# Patient Record
Sex: FEMALE | Race: WHITE | NOT HISPANIC OR LATINO | Employment: OTHER | URBAN - METROPOLITAN AREA
[De-identification: names, ages, dates, MRNs, and addresses within clinical notes are randomized per-mention and may not be internally consistent; named-entity substitution may affect disease eponyms.]

---

## 2017-01-11 ENCOUNTER — LAB VISIT (OUTPATIENT)
Dept: LAB | Facility: HOSPITAL | Age: 77
End: 2017-01-11
Attending: FAMILY MEDICINE
Payer: MEDICARE

## 2017-01-11 DIAGNOSIS — E78.5 HYPERLIPIDEMIA: ICD-10-CM

## 2017-01-11 DIAGNOSIS — I10 ESSENTIAL HYPERTENSION: ICD-10-CM

## 2017-01-11 LAB
ALBUMIN SERPL BCP-MCNC: 3.9 G/DL
ALP SERPL-CCNC: 78 U/L
ALT SERPL W/O P-5'-P-CCNC: 11 U/L
ANION GAP SERPL CALC-SCNC: 9 MMOL/L
AST SERPL-CCNC: 20 U/L
BILIRUB DIRECT SERPL-MCNC: 0.1 MG/DL
BILIRUB SERPL-MCNC: 0.3 MG/DL
BUN SERPL-MCNC: 23 MG/DL
CALCIUM SERPL-MCNC: 9.6 MG/DL
CHLORIDE SERPL-SCNC: 106 MMOL/L
CHOLEST/HDLC SERPL: 3.5 {RATIO}
CO2 SERPL-SCNC: 25 MMOL/L
CREAT SERPL-MCNC: 0.9 MG/DL
EST. GFR  (AFRICAN AMERICAN): >60 ML/MIN/1.73 M^2
EST. GFR  (NON AFRICAN AMERICAN): >60 ML/MIN/1.73 M^2
GLUCOSE SERPL-MCNC: 102 MG/DL
HDL/CHOLESTEROL RATIO: 28.6 %
HDLC SERPL-MCNC: 220 MG/DL
HDLC SERPL-MCNC: 63 MG/DL
LDLC SERPL CALC-MCNC: 126.8 MG/DL
NONHDLC SERPL-MCNC: 157 MG/DL
POTASSIUM SERPL-SCNC: 5.3 MMOL/L
PROT SERPL-MCNC: 7.9 G/DL
SODIUM SERPL-SCNC: 140 MMOL/L
TRIGL SERPL-MCNC: 151 MG/DL

## 2017-01-11 PROCEDURE — 36415 COLL VENOUS BLD VENIPUNCTURE: CPT | Mod: PO

## 2017-01-11 PROCEDURE — 80061 LIPID PANEL: CPT

## 2017-01-11 PROCEDURE — 80048 BASIC METABOLIC PNL TOTAL CA: CPT

## 2017-01-11 PROCEDURE — 80076 HEPATIC FUNCTION PANEL: CPT

## 2017-01-12 ENCOUNTER — TELEPHONE (OUTPATIENT)
Dept: FAMILY MEDICINE | Facility: CLINIC | Age: 77
End: 2017-01-12

## 2017-01-12 DIAGNOSIS — E78.5 HYPERLIPIDEMIA, UNSPECIFIED HYPERLIPIDEMIA TYPE: Primary | ICD-10-CM

## 2017-01-12 NOTE — TELEPHONE ENCOUNTER
----- Message from Joseluis Norman MD sent at 1/11/2017  9:44 PM CST -----  Cont current dose of lipid meds and recheck this in 6 months.

## 2017-02-14 ENCOUNTER — LAB VISIT (OUTPATIENT)
Dept: LAB | Facility: HOSPITAL | Age: 77
End: 2017-02-14
Attending: FAMILY MEDICINE
Payer: MEDICARE

## 2017-02-14 DIAGNOSIS — E03.9 HYPOTHYROIDISM, UNSPECIFIED TYPE: ICD-10-CM

## 2017-02-14 LAB
T4 FREE SERPL-MCNC: 1.17 NG/DL
TSH SERPL DL<=0.005 MIU/L-ACNC: 6.2 UIU/ML

## 2017-02-14 PROCEDURE — 36415 COLL VENOUS BLD VENIPUNCTURE: CPT | Mod: PO

## 2017-02-14 PROCEDURE — 84439 ASSAY OF FREE THYROXINE: CPT

## 2017-02-14 PROCEDURE — 84443 ASSAY THYROID STIM HORMONE: CPT

## 2017-02-16 DIAGNOSIS — E05.90 HYPERTHYROIDISM: Primary | ICD-10-CM

## 2017-02-16 RX ORDER — LEVOTHYROXINE SODIUM 50 UG/1
50 TABLET ORAL DAILY
Qty: 45 TABLET | Refills: 3 | Status: SHIPPED | OUTPATIENT
Start: 2017-02-16 | End: 2017-04-27 | Stop reason: SDUPTHER

## 2017-02-16 NOTE — TELEPHONE ENCOUNTER
----- Message from Joseluis Norman MD sent at 2/14/2017  6:07 PM CST -----  This is beteter than before but not quite where it needs to be.   She was on too much medicine when the dose was 0.075 mg per day.   Therefore, change her to 50 mcg every even day and 75 mcg every odd day.   Recheck her in 2 months.

## 2017-02-16 NOTE — TELEPHONE ENCOUNTER
I have signed for the following orders AND/OR meds.  Please call the patient and ask the patient to schedule the testing AND/OR inform about any medications that were sent.      Orders Placed This Encounter   Procedures    TSH     Standing Status:   Future     Standing Expiration Date:   4/17/2018         Medications Ordered This Encounter      levothyroxine (SYNTHROID) 50 MCG tablet          Sig: Take 1 tablet (50 mcg total) by mouth once daily. 50 mcg every even day and 75 mcg every odd day          Dispense:  45 tablet          Refill:  3

## 2017-03-14 DIAGNOSIS — E03.9 HYPOTHYROIDISM, UNSPECIFIED TYPE: Primary | ICD-10-CM

## 2017-03-15 NOTE — TELEPHONE ENCOUNTER
The pharmacy is requesting a 25 mcg dose of synthroid.  In Feb, I changed it to the following:    Notes Recorded by Joseluis Norman MD on 2/14/2017 at 6:07 PM  This is beteter than before but not quite where it needs to be.   She was on too much medicine when the dose was 0.075 mg per day.   Therefore, change her to 50 mcg every even day and 75 mcg every odd day.   Recheck her in 2 months.          Please confirm that she changed and that she is on the doses above and not 25 mcg a day.  She should be rechecked 2 months from changing her dose which would put her in late April if she changed in February when I suggested this.

## 2017-04-03 RX ORDER — LEVOTHYROXINE SODIUM 25 UG/1
TABLET ORAL
Qty: 90 TABLET | Refills: 0 | Status: SHIPPED | OUTPATIENT
Start: 2017-04-03 | End: 2017-04-28

## 2017-04-03 NOTE — TELEPHONE ENCOUNTER
Patient states she is taking a 75mcg ( a 50mcg and half a 50mcg to equal 75mcg) on Monday Wednesday AND Friday. On Tuesday Thursday Saturday and Sunday she is only taking 50mcg.     The 25 mcg is being requested so she doesn't have to keep breaking the 50mcg

## 2017-04-27 ENCOUNTER — OFFICE VISIT (OUTPATIENT)
Dept: CARDIOLOGY | Facility: CLINIC | Age: 77
End: 2017-04-27
Payer: MEDICARE

## 2017-04-27 ENCOUNTER — LAB VISIT (OUTPATIENT)
Dept: LAB | Facility: HOSPITAL | Age: 77
End: 2017-04-27
Attending: FAMILY MEDICINE
Payer: MEDICARE

## 2017-04-27 VITALS
SYSTOLIC BLOOD PRESSURE: 141 MMHG | BODY MASS INDEX: 28.05 KG/M2 | HEART RATE: 65 BPM | HEIGHT: 61 IN | WEIGHT: 148.56 LBS | DIASTOLIC BLOOD PRESSURE: 62 MMHG

## 2017-04-27 DIAGNOSIS — I47.19 ECTOPIC ATRIAL TACHYCARDIA: Primary | ICD-10-CM

## 2017-04-27 DIAGNOSIS — I10 ESSENTIAL HYPERTENSION: ICD-10-CM

## 2017-04-27 DIAGNOSIS — E78.5 DYSLIPIDEMIA: Chronic | ICD-10-CM

## 2017-04-27 DIAGNOSIS — E05.90 HYPERTHYROIDISM: ICD-10-CM

## 2017-04-27 DIAGNOSIS — I71.40 ABDOMINAL AORTIC ANEURYSM (AAA) WITHOUT RUPTURE: ICD-10-CM

## 2017-04-27 LAB — TSH SERPL DL<=0.005 MIU/L-ACNC: 3.52 UIU/ML

## 2017-04-27 PROCEDURE — 84443 ASSAY THYROID STIM HORMONE: CPT

## 2017-04-27 PROCEDURE — 99999 PR PBB SHADOW E&M-EST. PATIENT-LVL III: CPT | Mod: PBBFAC,,, | Performed by: NURSE PRACTITIONER

## 2017-04-27 PROCEDURE — 99213 OFFICE O/P EST LOW 20 MIN: CPT | Mod: S$PBB,,, | Performed by: NURSE PRACTITIONER

## 2017-04-27 PROCEDURE — 36415 COLL VENOUS BLD VENIPUNCTURE: CPT | Mod: PO

## 2017-04-27 RX ORDER — LEVOTHYROXINE SODIUM 50 UG/1
TABLET ORAL
Qty: 45 TABLET | Refills: 0 | Status: SHIPPED | OUTPATIENT
Start: 2017-04-27 | End: 2017-04-28

## 2017-04-27 NOTE — PROGRESS NOTES
Subjective:    Patient ID:  Gloria Hale is a 76 y.o. female who presents for follow-up of Follow-up      HPI: Ms. Gloria Hale presents to the clinic for follow up of non-obstructive carotid artery disease, EAT, HTN, HLP. she stated that she is doing pretty well. She denied any chest pain; however, she does have episodes of SOB and DOMINGUEZ that are chronic and unchanged from the past. She does notice palpitations at night. They are usually brief, but one time, it lasted about one hour. She did not have chest discomfort or SOB at the time. She takes metoprolol at night.  She stated that her first cousin passed away, and she went to the  yesterday. She is feeling low, but otherwise ok.    Medications: she is not missing any doses. She is having changes in thyroid replacement needs, and her dose has been adjusted to 75mcg M, W, F and 50mcg all other days. She is due to have lab work today.  Sodium: she does not add salt to foods,  she is not reading labels for sodium content. She stated that she eats a lot of pork.  Exercise: she tries to be active as much as possible; she is limited by chronic back pain. She does some work in the yard when she can. It usually takes a couple of days to recover from this. She feels tired with no evergy after working in the yard. No formal exercise.Tobacco:Former smoker; quit . no alcohol use      Carotid U/S 16: CONCLUSIONS   There is 40 - 49% right Internal Carotid stenosis.  There is 20 - 39% left Internal Carotid stenosis.    U/S Abdominal Aorta 10/05/16: Findings: Comparison 9 . Stable fusiform infrarenal abdominal aortic aneurysm measuring 2.9 x 2.7 cm in diameter 3.3 cm in length. Arterial sclerotic disease is again noted. Proximal aorta measured 2.2 x 2.5 cm. Mid aorta measured 1.9 x 1.7 cm. Both common iliac arteries are 10 millimeters in diameter.     Weight: 67.4 kg (148 lb 9.4 oz) she states that her daily weights has been stable  Wt Readings from Last 3  Encounters:   04/27/17 67.4 kg (148 lb 9.4 oz)   12/14/16 65.9 kg (145 lb 4.5 oz)   10/24/16 65.6 kg (144 lb 10 oz)     BP log: None. She does not check her BP at home.      Review of Systems   Constitution: Negative for chills, decreased appetite, fever, night sweats, weight gain and weight loss.   HENT: Negative for congestion.    Cardiovascular: Negative for chest pain, claudication, cyanosis, dyspnea on exertion, irregular heartbeat, leg swelling, near-syncope, orthopnea, palpitations, paroxysmal nocturnal dyspnea and syncope.   Respiratory: Negative for cough, hemoptysis, shortness of breath, sputum production and wheezing.    Hematologic/Lymphatic: Negative for adenopathy and bleeding problem. Does not bruise/bleed easily.   Skin: Negative for color change and nail changes.   Gastrointestinal: Negative for bloating, abdominal pain, change in bowel habit, heartburn, hematochezia, melena, nausea and vomiting.   Genitourinary: Negative for hematuria.   Neurological: Negative for dizziness and light-headedness.   Psychiatric/Behavioral: Negative for altered mental status.   Results for BABATUNDE AMIN (MRN [id number]) as of 4/27/2017 08:58   Ref. Range 1/11/2017 10:39   Sodium Latest Ref Range: 136 - 145 mmol/L 140   Potassium Latest Ref Range: 3.5 - 5.1 mmol/L 5.3 (H)   Chloride Latest Ref Range: 95 - 110 mmol/L 106   CO2 Latest Ref Range: 23 - 29 mmol/L 25   Anion Gap Latest Ref Range: 8 - 16 mmol/L 9   BUN, Bld Latest Ref Range: 8 - 23 mg/dL 23   Creatinine Latest Ref Range: 0.5 - 1.4 mg/dL 0.9   eGFR if non African American Latest Ref Range: >60 mL/min/1.73 m^2 >60.0   eGFR if African American Latest Ref Range: >60 mL/min/1.73 m^2 >60.0   Glucose Latest Ref Range: 70 - 110 mg/dL 102   Calcium Latest Ref Range: 8.7 - 10.5 mg/dL 9.6   Alkaline Phosphatase Latest Ref Range: 55 - 135 U/L 78   Total Protein Latest Ref Range: 6.0 - 8.4 g/dL 7.9   Albumin Latest Ref Range: 3.5 - 5.2 g/dL 3.9   Total Bilirubin Latest Ref  Range: 0.1 - 1.0 mg/dL 0.3   Bilirubin, Direct Latest Ref Range: 0.1 - 0.3 mg/dL 0.1   AST Latest Ref Range: 10 - 40 U/L 20   ALT Latest Ref Range: 10 - 44 U/L 11   Triglycerides Latest Ref Range: 30 - 150 mg/dL 151 (H)   Cholesterol Latest Ref Range: 120 - 199 mg/dL 220 (H)   HDL Latest Ref Range: 40 - 75 mg/dL 63   LDL Cholesterol Latest Ref Range: 63.0 - 159.0 mg/dL 126.8   Total Cholesterol/HDL Ratio Latest Ref Range: 2.0 - 5.0  3.5   Non-    Objective:   Physical Exam   Constitutional: She is oriented to person, place, and time. She appears well-developed and well-nourished. No distress.   HENT:   Head: Normocephalic and atraumatic.   Eyes: Conjunctivae are normal. No scleral icterus.   Neck: Neck supple. No JVD present. No tracheal deviation present. No thyromegaly present.   Cardiovascular: Normal rate, regular rhythm and intact distal pulses.  Exam reveals no gallop and no friction rub.    Murmur heard.  High-pitched blowing holosystolic murmur is present with a grade of 2/6  at the upper left sternal border, lower left sternal border  Pulses:       Carotid pulses are on the left side with bruit.  Pulmonary/Chest: Effort normal and breath sounds normal. No respiratory distress. She has no wheezes. She has no rales. She exhibits no tenderness.   Abdominal: Soft. Bowel sounds are normal. She exhibits no distension and no mass. There is no tenderness. There is no rebound and no guarding.   Musculoskeletal: Normal range of motion. She exhibits no edema.   Lymphadenopathy:     She has no cervical adenopathy.   Neurological: She is alert and oriented to person, place, and time.   Skin: Skin is warm and dry. No rash noted. She is not diaphoretic. No erythema. No pallor.   Beemer   Psychiatric: She has a normal mood and affect.        Assessment:      1. Ectopic atrial tachycardia    2. Essential hypertension    3. Abdominal aortic aneurysm (AAA) without rupture    4. Dyslipidemia        Plan:   Continue  current medications as directed.  Reviewed FLP with Ms. Hale. Her LDL goal should be 100 or less, and Non HDL goal should be less than 130. Given her thyroid issue, will not change her cholesterol medication at this time. She has FLP planned for July.   Reduce fat intake.  Sodium restriction.  Monitor BP and heart rate at home.  Encouraged low impact exercise most days of the week.  Can use extra metoprolol tablet if needed for prolonged, persistent palpitations. Monitor BP and heart rate.  Dr. Vick is following AAA, and she just had ultrasound in October. She is not due for another one until later this year.  Follow up in 6 months or call sooner for any problems.

## 2017-04-27 NOTE — MR AVS SNAPSHOT
[facility]  [address]  [place] [address]  Phone: [phone]  Fax: [phone]                  Gloria Hale   2017 9:00 AM   Office Visit    Description:  Female : 1940   Provider:  Siena Holliday NP   Department:  [facility]           Reason for Visit     Follow-up           Diagnoses this Visit        Comments    Ectopic atrial tachycardia    -  Primary     Essential hypertension         Abdominal aortic aneurysm (AAA) without rupture         Dyslipidemia                To Do List           Future Appointments        Provider Department Dept Phone    2017 8:25 AM LABORATORY, [place] [facility] [phone]      Goals (5 Years of Data)     None      Follow-Up and Disposition     Return in about 6 months (around 10/27/2017).      [facility]     [facility] Nurse Care Line -  Assistance  Unless otherwise directed by your provider, please contact [facility], our nurse care line that is available for  assistance.     Registered nurses in the [facility] provide: appointment scheduling, clinical advisement, health education, and other advisory services.  Call: [phone] (toll free)               Medications           Message regarding Medications     Verify the changes and/or additions to your medication regime listed below are the same as discussed with your clinician today.  If any of these changes or additions are incorrect, please notify your healthcare provider.             Verify that the below list of medications is an accurate representation of the medications you are currently taking.  If none reported, the list may be blank. If incorrect, please contact your healthcare provider. Carry this list with you in case of emergency.           Current Medications     amlodipine (NORVASC) 10 MG tablet Take 1 tablet (10 mg total) by mouth once daily.    calcium carbonate (OS-PAPITO) 500 mg calcium (1,250 mg)  "tablet Take 3 tablets by mouth once daily.    clotrimazole-betamethasone 1-0.05% (LOTRISONE) cream Apply topically 2 (two) times daily.    fish oil-omega-3 fatty acids 300-1,000 mg capsule Take 2 g by mouth once daily.    fluticasone (FLONASE) 50 mcg/actuation nasal spray 2 sprays by Each Nare route once daily.    gemfibrozil (LOPID) 600 MG tablet Take 1 tablet (600 mg total) by mouth 2 (two) times daily before meals.    levothyroxine (SYNTHROID) 25 MCG tablet TAKE 1 TABLET (25 MCG TOTAL) BY MOUTH ONCE DAILY.    lisinopril (PRINIVIL,ZESTRIL) 40 MG tablet Take 1 tablet (40 mg total) by mouth once daily.    metoprolol succinate (TOPROL-XL) 25 MG 24 hr tablet Take 1 tablet (25 mg total) by mouth once daily.    rosuvastatin (CRESTOR) 20 MG tablet Take 1 tablet (20 mg total) by mouth once daily. Give Generic Brand    levothyroxine (SYNTHROID) 50 MCG tablet TAKE 1 TABLET (50 MCG) EVERY EVEN DAY AND TAKE 1 1/2 TABLETS (75 MCG) EVERY ODD DAY           Clinical Reference Information           Your Vitals Were     BP Pulse Height Weight BMI    141/62 (BP Location: Left arm, Patient Position: Sitting, BP Method: Automatic) 65 5' 1" (1.549 m) 67.4 kg (148 lb 9.4 oz) 28.08 kg/m2      Blood Pressure          Most Recent Value    BP  (!)  141/62      Allergies as of 4/27/2017     No Known Allergies      Immunizations Administered on Date of Encounter - 4/27/2017     None      [facility] Sign-Up     Activating your [facility] account is as easy as 1-2-3!     1) Visit [email or web address], select Sign Up Now, enter this activation code and your date of birth, then select Next.  Activation code not generated  Current Patient Portal Status: Account disabled      2) Create a username and password to use when you visit [facility] in the future and select a security question in case you lose your password and select Next.    3) Enter your e-mail address and click Sign Up!    Additional Information  If you have questions, please e-mail " [email or web address] or call [phone] to talk to our [facility] staff. Remember, [facility] is NOT to be used for urgent needs. For medical emergencies, dial 911.         Language Assistance Services     ATTENTION: Language assistance services are available, free of charge. Please call [phone].      ATENCIÓN: Si habla español, tiene a camarena disposición servicios gratuitos de asistencia lingüística. Llame al [phone].     CHÚ Ý: N?u b?n nói Ti?ng Vi?t, có các d?ch v? h? tr? ngôn ng? mi?n phí dành cho b?n. G?i s? [phone].         [facility] complies with applicable Federal civil rights laws and does not discriminate on the basis of race, color, national origin, age, disability, or sex.

## 2017-04-28 RX ORDER — LEVOTHYROXINE SODIUM 75 UG/1
75 TABLET ORAL DAILY
Qty: 30 TABLET | Refills: 11 | Status: SHIPPED | OUTPATIENT
Start: 2017-04-28 | End: 2018-02-26 | Stop reason: SDUPTHER

## 2017-06-19 ENCOUNTER — TELEPHONE (OUTPATIENT)
Dept: FAMILY MEDICINE | Facility: CLINIC | Age: 77
End: 2017-06-19

## 2017-06-19 NOTE — TELEPHONE ENCOUNTER
----- Message from Jaime Ward sent at 6/19/2017 12:09 PM CDT -----  Contact: [phone]  Pt would like to consult with nurse about forms Dr Norman were to complete. Please call back at [phone] Thx LJ

## 2017-07-19 ENCOUNTER — LAB VISIT (OUTPATIENT)
Dept: LAB | Facility: HOSPITAL | Age: 77
End: 2017-07-19
Attending: FAMILY MEDICINE
Payer: MEDICARE

## 2017-07-19 DIAGNOSIS — E78.5 HYPERLIPIDEMIA, UNSPECIFIED HYPERLIPIDEMIA TYPE: ICD-10-CM

## 2017-07-19 LAB
ALBUMIN SERPL BCP-MCNC: 3.8 G/DL
ALP SERPL-CCNC: 72 U/L
ALT SERPL W/O P-5'-P-CCNC: 17 U/L
AST SERPL-CCNC: 20 U/L
BILIRUB DIRECT SERPL-MCNC: 0.2 MG/DL
BILIRUB SERPL-MCNC: 0.5 MG/DL
CHOLEST/HDLC SERPL: 3.5 {RATIO}
HDL/CHOLESTEROL RATIO: 28.6 %
HDLC SERPL-MCNC: 182 MG/DL
HDLC SERPL-MCNC: 52 MG/DL
LDLC SERPL CALC-MCNC: 101.2 MG/DL
NONHDLC SERPL-MCNC: 130 MG/DL
PROT SERPL-MCNC: 7.7 G/DL
TRIGL SERPL-MCNC: 144 MG/DL

## 2017-07-19 PROCEDURE — 80076 HEPATIC FUNCTION PANEL: CPT

## 2017-07-19 PROCEDURE — 80061 LIPID PANEL: CPT

## 2017-07-19 PROCEDURE — 36415 COLL VENOUS BLD VENIPUNCTURE: CPT | Mod: PO

## 2017-08-11 ENCOUNTER — OFFICE VISIT (OUTPATIENT)
Dept: FAMILY MEDICINE | Facility: CLINIC | Age: 77
End: 2017-08-11
Payer: MEDICARE

## 2017-08-11 VITALS
SYSTOLIC BLOOD PRESSURE: 150 MMHG | BODY MASS INDEX: 26.17 KG/M2 | TEMPERATURE: 98 F | HEIGHT: 62 IN | WEIGHT: 142.19 LBS | DIASTOLIC BLOOD PRESSURE: 59 MMHG | HEART RATE: 73 BPM

## 2017-08-11 DIAGNOSIS — J40 BRONCHITIS: ICD-10-CM

## 2017-08-11 DIAGNOSIS — J06.9 UPPER RESPIRATORY TRACT INFECTION, UNSPECIFIED TYPE: Primary | ICD-10-CM

## 2017-08-11 PROCEDURE — 99213 OFFICE O/P EST LOW 20 MIN: CPT | Mod: S$PBB,,, | Performed by: NURSE PRACTITIONER

## 2017-08-11 PROCEDURE — 99213 OFFICE O/P EST LOW 20 MIN: CPT | Mod: PBBFAC,PO | Performed by: NURSE PRACTITIONER

## 2017-08-11 PROCEDURE — 3077F SYST BP >= 140 MM HG: CPT | Mod: ,,, | Performed by: NURSE PRACTITIONER

## 2017-08-11 PROCEDURE — 99999 PR PBB SHADOW E&M-EST. PATIENT-LVL III: CPT | Mod: PBBFAC,,, | Performed by: NURSE PRACTITIONER

## 2017-08-11 PROCEDURE — 1126F AMNT PAIN NOTED NONE PRSNT: CPT | Mod: ,,, | Performed by: NURSE PRACTITIONER

## 2017-08-11 PROCEDURE — 3078F DIAST BP <80 MM HG: CPT | Mod: ,,, | Performed by: NURSE PRACTITIONER

## 2017-08-11 PROCEDURE — 1159F MED LIST DOCD IN RCRD: CPT | Mod: ,,, | Performed by: NURSE PRACTITIONER

## 2017-08-11 RX ORDER — CLOTRIMAZOLE AND BETAMETHASONE DIPROPIONATE 10; .64 MG/G; MG/G
CREAM TOPICAL 2 TIMES DAILY
Qty: 45 G | Refills: 0 | Status: SHIPPED | OUTPATIENT
Start: 2017-08-11 | End: 2018-03-26

## 2017-08-11 RX ORDER — AMOXICILLIN 875 MG/1
875 TABLET, FILM COATED ORAL 2 TIMES DAILY
Qty: 20 TABLET | Refills: 0 | Status: SHIPPED | OUTPATIENT
Start: 2017-08-11 | End: 2017-08-21

## 2017-08-11 RX ORDER — FLUTICASONE PROPIONATE 50 MCG
2 SPRAY, SUSPENSION (ML) NASAL DAILY
Qty: 16 G | Refills: 2 | Status: SHIPPED | OUTPATIENT
Start: 2017-08-11 | End: 2019-07-22

## 2017-08-11 NOTE — PROGRESS NOTES
Subjective:       Patient ID: Gloria Hale is a 76 y.o. female.    Chief Complaint: Nasal Congestion and Cough    HPI     Upper Respiratory Infection: Patient complains of symptoms of a URI, possible sinusitis. Symptoms include congestion, cough, sore throat and swollen glands. Onset of symptoms was 1 week ago, gradually worsening since that time. She also c/o cough described as productive and nasal congestion for the past 3 days .  She is drinking plenty of fluids. Evaluation to date: none. Treatment to date: antihistamines, cough suppressants and decongestants.        Review of Systems   Constitutional: Negative for fatigue, fever and unexpected weight change.   HENT: Positive for congestion, postnasal drip and sore throat. Negative for ear pain.    Eyes: Negative for pain and visual disturbance.   Respiratory: Positive for cough. Negative for shortness of breath.    Cardiovascular: Negative for chest pain and palpitations.   Gastrointestinal: Negative for abdominal pain, diarrhea and vomiting.   Musculoskeletal: Negative for arthralgias and myalgias.   Skin: Negative for color change and rash.   Neurological: Negative for dizziness and headaches.   Psychiatric/Behavioral: Negative for dysphoric mood and sleep disturbance. The patient is not nervous/anxious.        Vitals:    08/11/17 0942   BP: (!) 150/59   Pulse: 73   Temp: 97.6 °F (36.4 °C)       Objective:     Current Outpatient Prescriptions   Medication Sig Dispense Refill    amlodipine (NORVASC) 10 MG tablet Take 1 tablet (10 mg total) by mouth once daily. 90 tablet 3    calcium carbonate (OS-PAPITO) 500 mg calcium (1,250 mg) tablet Take 3 tablets by mouth once daily.      clotrimazole-betamethasone 1-0.05% (LOTRISONE) cream Apply topically 2 (two) times daily. 45 g 0    fish oil-omega-3 fatty acids 300-1,000 mg capsule Take 2 g by mouth once daily.      fluticasone (FLONASE) 50 mcg/actuation nasal spray 2 sprays by Each Nare route once daily. 16 g 2     levothyroxine (SYNTHROID) 75 MCG tablet Take 1 tablet (75 mcg total) by mouth once daily. 30 tablet 11    lisinopril (PRINIVIL,ZESTRIL) 40 MG tablet Take 1 tablet (40 mg total) by mouth once daily. 90 tablet 3    metoprolol succinate (TOPROL-XL) 25 MG 24 hr tablet Take 1 tablet (25 mg total) by mouth once daily. 90 tablet 3    rosuvastatin (CRESTOR) 20 MG tablet Take 1 tablet (20 mg total) by mouth once daily. Give Generic Brand 90 tablet 3    amoxicillin (AMOXIL) 875 MG tablet Take 1 tablet (875 mg total) by mouth 2 (two) times daily. 20 tablet 0    gemfibrozil (LOPID) 600 MG tablet Take 1 tablet (600 mg total) by mouth 2 (two) times daily before meals. 60 tablet 11     No current facility-administered medications for this visit.        Physical Exam   Constitutional: She is oriented to person, place, and time. She appears well-developed and well-nourished. No distress.   HENT:   Head: Normocephalic and atraumatic.   Right Ear: Tympanic membrane normal.   Left Ear: Tympanic membrane normal.   Nose: Mucosal edema and rhinorrhea present.   Mouth/Throat: Posterior oropharyngeal edema ( post nasal mucus) and posterior oropharyngeal erythema present. No oropharyngeal exudate.   Eyes: EOM are normal. Pupils are equal, round, and reactive to light.   Neck: Normal range of motion. Neck supple.   Cardiovascular: Normal rate and regular rhythm.    Pulmonary/Chest: Effort normal and breath sounds normal.   + loose cough   Musculoskeletal: Normal range of motion.   Lymphadenopathy:        Head (right side): Tonsillar adenopathy present.        Head (left side): Tonsillar adenopathy present.   Neurological: She is alert and oriented to person, place, and time.   Skin: Skin is warm and dry. No rash noted.   Psychiatric: She has a normal mood and affect. Judgment normal.   Nursing note and vitals reviewed.      Assessment:       1. Upper respiratory tract infection, unspecified type    2. Bronchitis        Plan:   Upper  respiratory tract infection, unspecified type  -     fluticasone (FLONASE) 50 mcg/actuation nasal spray; 2 sprays by Each Nare route once daily.  Dispense: 16 g; Refill: 2    Bronchitis  -     fluticasone (FLONASE) 50 mcg/actuation nasal spray; 2 sprays by Each Nare route once daily.  Dispense: 16 g; Refill: 2    Other orders  -     amoxicillin (AMOXIL) 875 MG tablet; Take 1 tablet (875 mg total) by mouth 2 (two) times daily.  Dispense: 20 tablet; Refill: 0  -     clotrimazole-betamethasone 1-0.05% (LOTRISONE) cream; Apply topically 2 (two) times daily.  Dispense: 45 g; Refill: 0        Return if symptoms worsen or fail to improve.

## 2017-08-17 ENCOUNTER — TELEPHONE (OUTPATIENT)
Dept: FAMILY MEDICINE | Facility: CLINIC | Age: 77
End: 2017-08-17

## 2017-08-17 NOTE — TELEPHONE ENCOUNTER
----- Message from Sha Duffy sent at 8/16/2017  8:41 AM CDT -----  Contact: [phone]  Pt would like to consult with the nurse about side effects form the antibiotics.  Pt would like another antibiotic called , Pt states  That the antibiotics makes her stomach upset and keeps her in the bathroom. Please call back at  [phone].  Thx-[facility]          Pt Uses:  .  [facility], [place], [place] - [address] [facility] [address] [id number] [address] [address]  Phone: [phone] Fax: [phone]

## 2017-08-18 ENCOUNTER — TELEPHONE (OUTPATIENT)
Dept: FAMILY MEDICINE | Facility: CLINIC | Age: 77
End: 2017-08-18

## 2017-08-18 NOTE — TELEPHONE ENCOUNTER
----- Message from Fritz Samuel sent at 8/18/2017  3:36 PM CDT -----  Contact: Pt   States she is calling cesia samuel her appt/ wants to be seen prior to next avail and can be reached at [phone]//[email or web address]/dbw

## 2017-08-21 ENCOUNTER — LAB VISIT (OUTPATIENT)
Dept: LAB | Facility: HOSPITAL | Age: 77
End: 2017-08-21
Attending: NURSE PRACTITIONER
Payer: MEDICARE

## 2017-08-21 ENCOUNTER — OFFICE VISIT (OUTPATIENT)
Dept: FAMILY MEDICINE | Facility: CLINIC | Age: 77
End: 2017-08-21
Payer: MEDICARE

## 2017-08-21 VITALS
DIASTOLIC BLOOD PRESSURE: 57 MMHG | HEIGHT: 62 IN | BODY MASS INDEX: 25.76 KG/M2 | TEMPERATURE: 98 F | WEIGHT: 140 LBS | HEART RATE: 76 BPM | SYSTOLIC BLOOD PRESSURE: 123 MMHG

## 2017-08-21 DIAGNOSIS — R93.89 ABNORMAL CT OF THE CHEST: ICD-10-CM

## 2017-08-21 DIAGNOSIS — J18.9 PNEUMONIA OF RIGHT LOWER LOBE DUE TO INFECTIOUS ORGANISM: ICD-10-CM

## 2017-08-21 DIAGNOSIS — J18.9 PNEUMONIA OF RIGHT LOWER LOBE DUE TO INFECTIOUS ORGANISM: Primary | ICD-10-CM

## 2017-08-21 LAB
BASOPHILS # BLD AUTO: 0.03 K/UL
BASOPHILS NFR BLD: 0.3 %
CREAT SERPL-MCNC: 1 MG/DL
DIFFERENTIAL METHOD: ABNORMAL
EOSINOPHIL # BLD AUTO: 0.1 K/UL
EOSINOPHIL NFR BLD: 1.4 %
ERYTHROCYTE [DISTWIDTH] IN BLOOD BY AUTOMATED COUNT: 13.5 %
EST. GFR  (AFRICAN AMERICAN): >60 ML/MIN/1.73 M^2
EST. GFR  (NON AFRICAN AMERICAN): 53.7 ML/MIN/1.73 M^2
HCT VFR BLD AUTO: 39.3 %
HGB BLD-MCNC: 12.8 G/DL
LYMPHOCYTES # BLD AUTO: 2.7 K/UL
LYMPHOCYTES NFR BLD: 25.8 %
MCH RBC QN AUTO: 27.2 PG
MCHC RBC AUTO-ENTMCNC: 32.6 G/DL
MCV RBC AUTO: 83 FL
MONOCYTES # BLD AUTO: 1.3 K/UL
MONOCYTES NFR BLD: 12.3 %
NEUTROPHILS # BLD AUTO: 6.2 K/UL
NEUTROPHILS NFR BLD: 60 %
PLATELET # BLD AUTO: 395 K/UL
PMV BLD AUTO: 10.2 FL
RBC # BLD AUTO: 4.71 M/UL
WBC # BLD AUTO: 10.26 K/UL

## 2017-08-21 PROCEDURE — 1126F AMNT PAIN NOTED NONE PRSNT: CPT | Mod: ,,, | Performed by: NURSE PRACTITIONER

## 2017-08-21 PROCEDURE — 99213 OFFICE O/P EST LOW 20 MIN: CPT | Mod: S$PBB,,, | Performed by: NURSE PRACTITIONER

## 2017-08-21 PROCEDURE — 99999 PR PBB SHADOW E&M-EST. PATIENT-LVL III: CPT | Mod: PBBFAC,,, | Performed by: NURSE PRACTITIONER

## 2017-08-21 PROCEDURE — 85025 COMPLETE CBC W/AUTO DIFF WBC: CPT

## 2017-08-21 PROCEDURE — 82565 ASSAY OF CREATININE: CPT

## 2017-08-21 PROCEDURE — 36415 COLL VENOUS BLD VENIPUNCTURE: CPT | Mod: PO

## 2017-08-21 PROCEDURE — 3078F DIAST BP <80 MM HG: CPT | Mod: ,,, | Performed by: NURSE PRACTITIONER

## 2017-08-21 PROCEDURE — 1159F MED LIST DOCD IN RCRD: CPT | Mod: ,,, | Performed by: NURSE PRACTITIONER

## 2017-08-21 PROCEDURE — 3074F SYST BP LT 130 MM HG: CPT | Mod: ,,, | Performed by: NURSE PRACTITIONER

## 2017-08-21 RX ORDER — AZITHROMYCIN 250 MG/1
TABLET, FILM COATED ORAL
COMMUNITY
Start: 2017-08-18 | End: 2017-08-28

## 2017-08-21 RX ORDER — ALBUTEROL SULFATE 90 UG/1
2 AEROSOL, METERED RESPIRATORY (INHALATION)
COMMUNITY
Start: 2017-08-17 | End: 2017-08-28 | Stop reason: SDUPTHER

## 2017-08-21 RX ORDER — ALBUTEROL SULFATE 0.63 MG/3ML
0.63 SOLUTION RESPIRATORY (INHALATION)
COMMUNITY
Start: 2017-08-17 | End: 2017-08-30 | Stop reason: SDUPTHER

## 2017-08-21 NOTE — PROGRESS NOTES
Subjective:       Patient ID: Gloria Hale is a 77 y.o. female.    Chief Complaint: Follow-up    HPI     She comes into the office for follow-up.  She was seen in the office on the 11th, she was discharged with a prescription for Augmentin.  She states this was too strong and upset her stomach causing diarrhea.  She discontinued this medicine.  Respiratory symptoms worsened, she went to the emergency room at [facility].  She was treated for pneumonia and given a prescription for a azithromycin.  She had her last dose of azithromycin today, she has 1 more day of prednisone to take.  She continues to have weakness and fatigue, nonproductive cough, and general malaise.  She is insistent that she is given another antibiotic today.  In reviewing her CAT scan, it was unclear if the abnormality to the right lung was pneumonia or something else.  CT of the chest was recommended to ensure that the pneumonia resolves.    Review of Systems   Constitutional: Positive for fatigue. Negative for fever and unexpected weight change.   HENT: Negative for ear pain and sore throat.    Eyes: Negative for pain and visual disturbance.   Respiratory: Positive for choking and chest tightness. Negative for cough and shortness of breath.    Cardiovascular: Negative for chest pain and palpitations.   Gastrointestinal: Negative for abdominal pain, diarrhea and vomiting.   Musculoskeletal: Negative for arthralgias and myalgias.   Skin: Negative for color change and rash.   Neurological: Positive for weakness. Negative for dizziness and headaches.   Psychiatric/Behavioral: Negative for dysphoric mood and sleep disturbance. The patient is not nervous/anxious.        Vitals:    08/21/17 0923   BP: (!) 123/57   Pulse: 76   Temp: 97.8 °F (36.6 °C)       Objective:     Current Outpatient Prescriptions   Medication Sig Dispense Refill    albuterol (ACCUNEB) 0.63 mg/3 mL Nebu Inhale 0.63 mg into the lungs.      albuterol 90 mcg/actuation  inhaler Inhale 2 puffs into the lungs.      amlodipine (NORVASC) 10 MG tablet Take 1 tablet (10 mg total) by mouth once daily. 90 tablet 3    azithromycin (Z-JAROD) 250 MG tablet Take one 250mg tablet PO daily for 4 days.      calcium carbonate (OS-PAPITO) 500 mg calcium (1,250 mg) tablet Take 3 tablets by mouth once daily.      clotrimazole-betamethasone 1-0.05% (LOTRISONE) cream Apply topically 2 (two) times daily. 45 g 0    fish oil-omega-3 fatty acids 300-1,000 mg capsule Take 2 g by mouth once daily.      fluticasone (FLONASE) 50 mcg/actuation nasal spray 2 sprays by Each Nare route once daily. 16 g 2    gemfibrozil (LOPID) 600 MG tablet Take 1 tablet (600 mg total) by mouth 2 (two) times daily before meals. 60 tablet 11    levothyroxine (SYNTHROID) 75 MCG tablet Take 1 tablet (75 mcg total) by mouth once daily. 30 tablet 11    lisinopril (PRINIVIL,ZESTRIL) 40 MG tablet Take 1 tablet (40 mg total) by mouth once daily. 90 tablet 3    metoprolol succinate (TOPROL-XL) 25 MG 24 hr tablet Take 1 tablet (25 mg total) by mouth once daily. 90 tablet 3    rosuvastatin (CRESTOR) 20 MG tablet Take 1 tablet (20 mg total) by mouth once daily. Give Generic Brand 90 tablet 3     No current facility-administered medications for this visit.        Physical Exam   Constitutional: She is oriented to person, place, and time. She appears well-developed and well-nourished. No distress.   HENT:   Head: Normocephalic and atraumatic.   Mouth/Throat: Oropharynx is clear and moist.   Eyes: EOM are normal. Pupils are equal, round, and reactive to light.   Neck: Normal range of motion. Neck supple.   Cardiovascular: Normal rate and regular rhythm.    Pulmonary/Chest: Effort normal. No respiratory distress. She has decreased breath sounds in the right upper field, the right middle field and the right lower field. She has no wheezes. She has no rales.   Nonproductive cough   Musculoskeletal: Normal range of motion.   Lymphadenopathy:      She has no cervical adenopathy.   Neurological: She is alert and oriented to person, place, and time.   Skin: Skin is warm and dry. No rash noted.   Psychiatric: She has a normal mood and affect. Judgment normal.   Nursing note and vitals reviewed.      Assessment:       1. Pneumonia of right lower lobe due to infectious organism    2. Abnormal CT of the chest        Plan:   Pneumonia of right lower lobe due to infectious organism  -     CT Chest With Contrast; Future; Expected date: 08/22/2017  -     Creatinine, serum; Future; Expected date: 08/21/2017  -     CBC auto differential; Future; Expected date: 08/21/2017    Abnormal CT of the chest  -     CT Chest With Contrast; Future; Expected date: 08/22/2017  -     Creatinine, serum; Future; Expected date: 08/21/2017  -     CBC auto differential; Future; Expected date: 08/21/2017        No Follow-up on file.

## 2017-08-22 ENCOUNTER — TELEPHONE (OUTPATIENT)
Dept: FAMILY MEDICINE | Facility: CLINIC | Age: 77
End: 2017-08-22

## 2017-08-22 NOTE — TELEPHONE ENCOUNTER
----- Message from Nehemiah Perry sent at 8/22/2017  9:24 AM CDT -----  Pt is requesting a call from nurse to discuss a test.          Please call pt back at [phone]

## 2017-08-22 NOTE — TELEPHONE ENCOUNTER
Returned patient call. She states [facility] does not have her orders. Will refax today.

## 2017-08-28 ENCOUNTER — TELEPHONE (OUTPATIENT)
Dept: FAMILY MEDICINE | Facility: CLINIC | Age: 77
End: 2017-08-28

## 2017-08-28 DIAGNOSIS — R91.8 ABNORMAL CT SCAN OF LUNG: Primary | ICD-10-CM

## 2017-08-28 RX ORDER — ALBUTEROL SULFATE 90 UG/1
2 AEROSOL, METERED RESPIRATORY (INHALATION) EVERY 6 HOURS PRN
Qty: 8 G | Refills: 0 | Status: SHIPPED | OUTPATIENT
Start: 2017-08-28 | End: 2018-02-26 | Stop reason: SDUPTHER

## 2017-08-28 RX ORDER — AZITHROMYCIN 250 MG/1
TABLET, FILM COATED ORAL
Qty: 6 TABLET | Refills: 0 | Status: SHIPPED | OUTPATIENT
Start: 2017-08-28 | End: 2018-02-26

## 2017-08-28 NOTE — TELEPHONE ENCOUNTER
Patient is requesting the zpack and albuterol. She wants second opinion from dr. lyon before getting the pulmonology referral

## 2017-08-28 NOTE — TELEPHONE ENCOUNTER
----- Message from Emily Swan sent at 8/28/2017 12:13 PM CDT -----  Contact: Patient  Patient is requesting the results of her scan, please call her back at [phone]. Thank you

## 2017-08-30 ENCOUNTER — TELEPHONE (OUTPATIENT)
Dept: FAMILY MEDICINE | Facility: CLINIC | Age: 77
End: 2017-08-30

## 2017-08-30 DIAGNOSIS — R93.89 ABNORMAL CT OF THE CHEST: Primary | ICD-10-CM

## 2017-08-30 DIAGNOSIS — E78.5 HYPERLIPIDEMIA, UNSPECIFIED HYPERLIPIDEMIA TYPE: ICD-10-CM

## 2017-08-30 RX ORDER — GEMFIBROZIL 600 MG/1
600 TABLET, FILM COATED ORAL
Qty: 60 TABLET | Refills: 11 | Status: SHIPPED | OUTPATIENT
Start: 2017-08-30 | End: 2018-02-26 | Stop reason: SDUPTHER

## 2017-08-30 RX ORDER — ALBUTEROL SULFATE 0.63 MG/3ML
SOLUTION RESPIRATORY (INHALATION)
Qty: 90 ML | Refills: 0 | Status: SHIPPED | OUTPATIENT
Start: 2017-08-30 | End: 2017-08-31 | Stop reason: SDUPTHER

## 2017-08-30 NOTE — TELEPHONE ENCOUNTER
There is a very large pulmonary group in [place] that are associated with [facility] and tied in with us>  I have put in another referral for you to book with one of those guys but I don't have a diagnosis as to why we are sending her.  Please prompt me on that and we can have this completed.  I would prefer her with one of the doctors that can work with me electronically.

## 2017-08-30 NOTE — TELEPHONE ENCOUNTER
Patient has referral to go to pulmonology but I am not seeing where we have any pulmonology in [place] and she refuses [place] or [place]. She would like for you to refer her to a pulmonology in [place]. Please advise

## 2017-08-31 RX ORDER — ALBUTEROL SULFATE 0.63 MG/3ML
SOLUTION RESPIRATORY (INHALATION)
Qty: 90 ML | Refills: 0 | Status: SHIPPED | OUTPATIENT
Start: 2017-08-31 | End: 2018-03-26 | Stop reason: SDUPTHER

## 2017-09-01 NOTE — TELEPHONE ENCOUNTER
Left vm for pt to call back.  Can't schedule her with pulmonology in computer, will give her number [phone] to have her schedule the appt.

## 2017-09-01 NOTE — TELEPHONE ENCOUNTER
I have signed for the following orders AND/OR meds.  Please call the patient and ask the patient to schedule the testing AND/OR inform about any medications that were sent.      Orders Placed This Encounter   Procedures    Ambulatory referral to Pulmonology     Referral Priority:   Routine     Referral Type:   Consultation     Referral Reason:   Specialty Services Required     Requested Specialty:   Pulmonary Disease     Number of Visits Requested:   1

## 2017-10-03 ENCOUNTER — TELEPHONE (OUTPATIENT)
Dept: FAMILY MEDICINE | Facility: CLINIC | Age: 77
End: 2017-10-03

## 2017-10-03 DIAGNOSIS — E78.5 HYPERLIPIDEMIA, UNSPECIFIED HYPERLIPIDEMIA TYPE: ICD-10-CM

## 2017-10-03 DIAGNOSIS — Z12.39 BREAST CANCER SCREENING: Primary | ICD-10-CM

## 2017-10-03 RX ORDER — ROSUVASTATIN CALCIUM 20 MG/1
20 TABLET, COATED ORAL DAILY
Qty: 90 TABLET | Refills: 3 | Status: SHIPPED | OUTPATIENT
Start: 2017-10-03 | End: 2018-02-26 | Stop reason: SDUPTHER

## 2017-10-03 NOTE — TELEPHONE ENCOUNTER
I have signed for the following orders AND/OR meds.  Please call the patient and ask the patient to schedule the testing AND/OR inform about any medications that were sent.      Orders Placed This Encounter   Procedures    Mammo Digital Screening Bilat with CAD     Standing Status:   Future     Standing Expiration Date:   12/3/2018     Order Specific Question:   May the Radiologist modify the order per protocol to meet the clinical needs of the patient?     Answer:   Yes

## 2017-10-03 NOTE — TELEPHONE ENCOUNTER
----- Message from Jyoti Calderon sent at 10/3/2017  8:39 AM CDT -----  Contact: pt   Pt need orders in to have mammo done.   ..[phone] (yicw)

## 2017-10-12 ENCOUNTER — HOSPITAL ENCOUNTER (OUTPATIENT)
Dept: RADIOLOGY | Facility: HOSPITAL | Age: 77
Discharge: HOME | End: 2017-10-12
Attending: FAMILY MEDICINE
Payer: MEDICARE

## 2017-10-12 VITALS — BODY MASS INDEX: 25.58 KG/M2 | WEIGHT: 139 LBS | HEIGHT: 62 IN

## 2017-10-12 DIAGNOSIS — Z12.39 BREAST CANCER SCREENING: ICD-10-CM

## 2017-10-12 PROCEDURE — 77067 SCR MAMMO BI INCL CAD: CPT | Mod: 26,,, | Performed by: RADIOLOGY

## 2017-10-12 PROCEDURE — 77063 BREAST TOMOSYNTHESIS BI: CPT | Mod: 26,,, | Performed by: RADIOLOGY

## 2017-10-12 PROCEDURE — 77067 SCR MAMMO BI INCL CAD: CPT | Mod: TC

## 2017-10-24 ENCOUNTER — TELEPHONE (OUTPATIENT)
Dept: FAMILY MEDICINE | Facility: CLINIC | Age: 77
End: 2017-10-24

## 2017-10-24 NOTE — TELEPHONE ENCOUNTER
----- Message from Gisel Lopez sent at 10/24/2017 11:18 AM CDT -----  Contact: Patient  Patient called to speak with the nurse; she wants to know how she can get a copy of disk of an MRI she had done years ago. She would prefer to pick it up in [place]. She can be contacted at [phone].    Thanks,  Gisel

## 2018-01-10 DIAGNOSIS — I47.19 ECTOPIC ATRIAL TACHYCARDIA: ICD-10-CM

## 2018-01-10 DIAGNOSIS — I10 ESSENTIAL HYPERTENSION: ICD-10-CM

## 2018-01-10 RX ORDER — METOPROLOL SUCCINATE 25 MG/1
25 TABLET, EXTENDED RELEASE ORAL DAILY
Qty: 90 TABLET | Refills: 3 | Status: SHIPPED | OUTPATIENT
Start: 2018-01-10 | End: 2018-02-26 | Stop reason: SDUPTHER

## 2018-01-10 RX ORDER — LISINOPRIL 40 MG/1
40 TABLET ORAL DAILY
Qty: 90 TABLET | Refills: 3 | Status: SHIPPED | OUTPATIENT
Start: 2018-01-10 | End: 2018-02-26 | Stop reason: SDUPTHER

## 2018-02-13 ENCOUNTER — TELEPHONE (OUTPATIENT)
Dept: FAMILY MEDICINE | Facility: CLINIC | Age: 78
End: 2018-02-13

## 2018-02-13 NOTE — TELEPHONE ENCOUNTER
----- Message from Kayy Voss sent at 2/13/2018  2:55 PM CST -----  Contact: pt  The pt request a call, no additional info given, pt can be reached at [phone]///[id number]

## 2018-02-16 ENCOUNTER — TELEPHONE (OUTPATIENT)
Dept: FAMILY MEDICINE | Facility: CLINIC | Age: 78
End: 2018-02-16

## 2018-02-26 ENCOUNTER — LAB VISIT (OUTPATIENT)
Dept: LAB | Facility: HOSPITAL | Age: 78
End: 2018-02-26
Attending: FAMILY MEDICINE
Payer: MEDICARE

## 2018-02-26 ENCOUNTER — OFFICE VISIT (OUTPATIENT)
Dept: FAMILY MEDICINE | Facility: CLINIC | Age: 78
End: 2018-02-26
Payer: MEDICARE

## 2018-02-26 VITALS
BODY MASS INDEX: 25.75 KG/M2 | HEART RATE: 69 BPM | HEIGHT: 61 IN | SYSTOLIC BLOOD PRESSURE: 138 MMHG | WEIGHT: 136.38 LBS | DIASTOLIC BLOOD PRESSURE: 63 MMHG

## 2018-02-26 DIAGNOSIS — E03.9 ACQUIRED HYPOTHYROIDISM: Primary | ICD-10-CM

## 2018-02-26 DIAGNOSIS — E78.5 DYSLIPIDEMIA: Chronic | ICD-10-CM

## 2018-02-26 DIAGNOSIS — E03.9 ACQUIRED HYPOTHYROIDISM: ICD-10-CM

## 2018-02-26 DIAGNOSIS — J44.9 CHRONIC OBSTRUCTIVE PULMONARY DISEASE, UNSPECIFIED COPD TYPE: ICD-10-CM

## 2018-02-26 DIAGNOSIS — C34.11 CANCER OF UPPER LOBE OF RIGHT LUNG: ICD-10-CM

## 2018-02-26 DIAGNOSIS — I10 ESSENTIAL HYPERTENSION: ICD-10-CM

## 2018-02-26 DIAGNOSIS — E78.5 HYPERLIPIDEMIA, UNSPECIFIED HYPERLIPIDEMIA TYPE: ICD-10-CM

## 2018-02-26 DIAGNOSIS — M85.80 OSTEOPENIA, UNSPECIFIED LOCATION: ICD-10-CM

## 2018-02-26 DIAGNOSIS — R91.8 ABNORMAL CT SCAN OF LUNG: ICD-10-CM

## 2018-02-26 DIAGNOSIS — I71.40 ABDOMINAL AORTIC ANEURYSM (AAA) WITHOUT RUPTURE: ICD-10-CM

## 2018-02-26 DIAGNOSIS — I47.19 ECTOPIC ATRIAL TACHYCARDIA: ICD-10-CM

## 2018-02-26 DIAGNOSIS — R09.89 BILATERAL CAROTID BRUITS: ICD-10-CM

## 2018-02-26 LAB
ALBUMIN SERPL BCP-MCNC: 4 G/DL
ALP SERPL-CCNC: 82 U/L
ALT SERPL W/O P-5'-P-CCNC: 13 U/L
ANION GAP SERPL CALC-SCNC: 11 MMOL/L
AST SERPL-CCNC: 20 U/L
BILIRUB SERPL-MCNC: 0.3 MG/DL
BUN SERPL-MCNC: 18 MG/DL
CALCIUM SERPL-MCNC: 10 MG/DL
CHLORIDE SERPL-SCNC: 107 MMOL/L
CHOLEST SERPL-MCNC: 235 MG/DL
CHOLEST/HDLC SERPL: 3.5 {RATIO}
CO2 SERPL-SCNC: 23 MMOL/L
CREAT SERPL-MCNC: 0.9 MG/DL
EST. GFR  (AFRICAN AMERICAN): >60 ML/MIN/1.73 M^2
EST. GFR  (NON AFRICAN AMERICAN): >60 ML/MIN/1.73 M^2
GLUCOSE SERPL-MCNC: 95 MG/DL
HDLC SERPL-MCNC: 67 MG/DL
HDLC SERPL: 28.5 %
LDLC SERPL CALC-MCNC: 135.2 MG/DL
NONHDLC SERPL-MCNC: 168 MG/DL
POTASSIUM SERPL-SCNC: 4.5 MMOL/L
PROT SERPL-MCNC: 8.6 G/DL
SODIUM SERPL-SCNC: 141 MMOL/L
TRIGL SERPL-MCNC: 164 MG/DL
TSH SERPL DL<=0.005 MIU/L-ACNC: 1.08 UIU/ML

## 2018-02-26 PROCEDURE — 1159F MED LIST DOCD IN RCRD: CPT | Mod: ,,, | Performed by: FAMILY MEDICINE

## 2018-02-26 PROCEDURE — 80053 COMPREHEN METABOLIC PANEL: CPT

## 2018-02-26 PROCEDURE — 99213 OFFICE O/P EST LOW 20 MIN: CPT | Mod: PBBFAC,PO | Performed by: FAMILY MEDICINE

## 2018-02-26 PROCEDURE — 99999 PR PBB SHADOW E&M-EST. PATIENT-LVL III: CPT | Mod: PBBFAC,,, | Performed by: FAMILY MEDICINE

## 2018-02-26 PROCEDURE — 36415 COLL VENOUS BLD VENIPUNCTURE: CPT | Mod: PO

## 2018-02-26 PROCEDURE — 99214 OFFICE O/P EST MOD 30 MIN: CPT | Mod: S$PBB,,, | Performed by: FAMILY MEDICINE

## 2018-02-26 PROCEDURE — 84443 ASSAY THYROID STIM HORMONE: CPT

## 2018-02-26 PROCEDURE — 80061 LIPID PANEL: CPT

## 2018-02-26 RX ORDER — DILTIAZEM HYDROCHLORIDE 240 MG/1
240 CAPSULE, COATED, EXTENDED RELEASE ORAL DAILY
Qty: 90 CAPSULE | Refills: 3 | Status: SHIPPED | OUTPATIENT
Start: 2018-02-26 | End: 2018-08-10 | Stop reason: SDUPTHER

## 2018-02-26 RX ORDER — DILTIAZEM HYDROCHLORIDE 240 MG/1
240 CAPSULE, COATED, EXTENDED RELEASE ORAL DAILY
COMMUNITY
Start: 2018-01-24 | End: 2018-02-26 | Stop reason: SDUPTHER

## 2018-02-26 RX ORDER — ALBUTEROL SULFATE 90 UG/1
2 AEROSOL, METERED RESPIRATORY (INHALATION) EVERY 6 HOURS PRN
Qty: 8 G | Refills: 11 | Status: SHIPPED | OUTPATIENT
Start: 2018-02-26 | End: 2019-07-22

## 2018-02-26 RX ORDER — METOPROLOL SUCCINATE 25 MG/1
25 TABLET, EXTENDED RELEASE ORAL DAILY
Qty: 90 TABLET | Refills: 3 | Status: SHIPPED | OUTPATIENT
Start: 2018-02-26 | End: 2018-08-10 | Stop reason: SDUPTHER

## 2018-02-26 RX ORDER — LISINOPRIL 40 MG/1
40 TABLET ORAL DAILY
Qty: 90 TABLET | Refills: 3 | Status: ON HOLD | OUTPATIENT
Start: 2018-02-26 | End: 2018-03-29 | Stop reason: HOSPADM

## 2018-02-26 RX ORDER — ROSUVASTATIN CALCIUM 20 MG/1
20 TABLET, COATED ORAL DAILY
Qty: 90 TABLET | Refills: 3 | Status: SHIPPED | OUTPATIENT
Start: 2018-02-26 | End: 2018-02-27 | Stop reason: SDUPTHER

## 2018-02-26 RX ORDER — GEMFIBROZIL 600 MG/1
600 TABLET, FILM COATED ORAL DAILY
Qty: 90 TABLET | Refills: 3 | Status: SHIPPED | OUTPATIENT
Start: 2018-02-26 | End: 2018-02-27

## 2018-02-26 RX ORDER — LEVOTHYROXINE SODIUM 75 UG/1
75 TABLET ORAL DAILY
Qty: 90 TABLET | Refills: 3 | Status: SHIPPED | OUTPATIENT
Start: 2018-02-26 | End: 2019-03-07 | Stop reason: SDUPTHER

## 2018-02-26 NOTE — PROGRESS NOTES
Subjective:      Patient ID: Gloria Hale is a 77 y.o. female.    Chief Complaint: Follow-up (medication review)    Problem List Items Addressed This Visit     Acquired hypothyroidism - Primary    Overview     The patient presents with hypothyroidism.  The patient denies agitation, anxiety, blurred vision, chest pain, cold intolerance, constipation, dizziness, dry skin, fatigue, lightheadedness, paresthesias, skin coarsening, tachycardia, tremor, weight gain or weight loss.  The patient's current treatment has included Synthroid with a good response.    Lab Results   Component Value Date    TSH 3.524 04/27/2017              Relevant Medications    levothyroxine (SYNTHROID) 75 MCG tablet    Other Relevant Orders    TSH    Aortic aneurysm    Overview     She has had an abdominal aortic aneurysm.   She has had this tracked.     Narrative     Findings: Comparison 9 2115. Stable fusiform infrarenal abdominal aortic aneurysm measuring 2.9 x 2.7 cm in diameter 3.3 cm in length. Arterial sclerotic disease is again noted. Proximal aorta measured 2.2 x 2.5 cm. Mid aorta measured 1.9 x 1.7 cm. Both common iliac arteries are 10 millimeters in diameter.   Impression      Stable exam.      Electronically signed by: SAUNDRA TELLEZ MD  Date: 10/05/16  Time: 14:35               Relevant Orders    US Abdominal Aorta    Bilateral carotid bruits    Overview     She has had carotid bruits bilaterally since around 2011 and she has had an ultrasound in the past.  This needs to be tracked over time.            Relevant Orders    US Carotid Bilateral    Cancer of upper lobe of right lung    Overview     She had a resection of her right upper lobe due to a cancer at [facility] and she reports that the nodes were all negative.   She is not needing chemo or radiation.          COPD (chronic obstructive pulmonary disease)    Overview     The patient presents with COPD.  The patient denies chest pain, palpitations, shortness of  breath, difficulty breathing, and wheezing.  The patient feels that the pattern of symptoms is stable.  Current treatment has included albuterol inhaler.             Dyslipidemia (Chronic)    Overview     The patient presents with hyperlipidemia.  The patient reports tolerating the medication well and is in excellent compliance.  There have been no medication side effects.  The patient denies chest pain, neuropathy, and myalgias.  The patient has reduced fat intake and has been exercising.  Current treatment has included the medications listed in the med card.    Lab Results   Component Value Date    CHOL 182 07/19/2017    CHOL 220 (H) 01/11/2017    CHOL 231 (H) 07/06/2016       Lab Results   Component Value Date    HDL 52 07/19/2017    HDL 63 01/11/2017    HDL 81 (H) 07/06/2016       Lab Results   Component Value Date    LDLCALC 101.2 07/19/2017    LDLCALC 126.8 01/11/2017    LDLCALC 121.6 07/06/2016       Lab Results   Component Value Date    TRIG 144 07/19/2017    TRIG 151 (H) 01/11/2017    TRIG 142 07/06/2016       Lab Results   Component Value Date    CHOLHDL 28.6 07/19/2017    CHOLHDL 28.6 01/11/2017    CHOLHDL 35.1 07/06/2016     Lab Results   Component Value Date    ALT 17 07/19/2017    AST 20 07/19/2017    ALKPHOS 72 07/19/2017    BILITOT 0.5 07/19/2017              Relevant Medications    gemfibrozil (LOPID) 600 MG tablet    Other Relevant Orders    Lipid panel    Ectopic atrial tachycardia    Overview     Holter monitor - 24 hour   Order: [id number]   Status:  Final result Visible to patient:  No (Not Released) Next appt:  10/24/2016 at 01:20 PM in Cardiology (Siena Holliday NP) Dx:  Palpitations; SOB (shortness of breat...   Narrative   Date of Procedure: 10/13/2016    PRE-TEST DATA   The diary was not returned.        TEST DESCRIPTION   PREDOMINANT RHYTHM  1. Sinus rhythm with heart rates varying between 48 and 124 bpm with an average of 73 bpm.     VENTRICULAR ARRHYTHMIAS  1. There were very rare  polymorphic PVCs recorded totalling 2 and averaging less than 1 per hour.     2. There were no episodes of ventricular tachycardia.    SUPRA VENTRICULAR ARRHYTHMIAS  1. There were occasional PACs totalling 456 and averaging 19 per hour.     2. There were 3 non-sustained runs of EAT lasting from 3 to 4 beats.     SINUS NODE FUNCTION  1. There was no evidence of high grade SA misti block.     AV CONDUCTION  1. There was no evidence of high grade AV block.     DIARY  1. The diary was not returned    MISCELLANEOUS  1. This was a tape of adequate length (24 hrs).             This document has been electronically    SIGNED BY: Wolf Gonzales MD On: 10/17/2016 15:38                    Relevant Medications    diltiaZEM (CARDIZEM CD) 240 MG 24 hr capsule    metoprolol succinate (TOPROL-XL) 25 MG 24 hr tablet    HTN (hypertension)    Overview     The patient presents with essential hypertension.  The patient is tolerating the medication well and is in excellent compliance.  The patient is experiencing no side effects.  Counseling was offered regarding low salt diets.  The patient has a reduced salt intake.  The patient denies chest pain, palpitations, shortness of breath, dyspnea on exertion, left or murmur neck pain, nausea, vomiting, diaphoresis, paroxysmal nocturnal dyspnea, and orthopnea.   Hypertension Medications             diltiaZEM (CARDIZEM CD) 240 MG 24 hr capsule Take 240 mg by mouth once daily.     lisinopril (PRINIVIL,ZESTRIL) 40 MG tablet TAKE 1 TABLET (40 MG TOTAL) BY MOUTH ONCE DAILY.    metoprolol succinate (TOPROL-XL) 25 MG 24 hr tablet TAKE 1 TABLET (25 MG TOTAL) BY MOUTH ONCE DAILY.                 Relevant Medications    diltiaZEM (CARDIZEM CD) 240 MG 24 hr capsule    lisinopril (PRINIVIL,ZESTRIL) 40 MG tablet    metoprolol succinate (TOPROL-XL) 25 MG 24 hr tablet    Other Relevant Orders    Comprehensive metabolic panel    Osteopenia (Chronic)    Overview     The patient presents with osteopenia.   The patient denies back pain, bone pain, hip pain and has had prior fractures.  The patient has not been performing weight bearing exercises.  Current treatment has not included medications for this condition.  The patient has had a bone density in the past and it is not due to be performed at this time.   Narrative     DXA scan was performed over the left hip and lumbar spine.  Review of the images confirms satisfactory positioning and technique.    The L1 to L4 vertebral bone mineral density is equal to .954 g/cm squared with a T score of -.8  and a Z score of 1.7.  This represents a  5.3% increase in bone mineral density since the prior study and 5.3 % increase since baseline, which is a statistically significant change.    The left femoral neck bone mineral density is equal to .608  g/cm squared with a T score of -2.2 and a Z score of .1.  This represents a 9.2% decrease in bone mineral density since the prior study and 9.2% decrease since baseline, which is a statistically significant change.   Impression      Osteopenia -- there is a 15 % risk of a major osteoporotic fracture and a 4.5 % risk of hip fracture in the next 10 years (FRAX).    Consider FDA approved medical therapies in postmenopausal women age 50 years and older, based on the following:    -- A hip or vertebral (clinical or morphometric) fracture.  -- T score less than or equal to -2.5 at the femoral neck or spine after appropriate evaluation to exclude secondary causes.  -- Low bone mass -- also known as osteopenia (T score between -1.0 and -2.5 at the femoral neck or spine) and a 10 year probability of hip fracture greater than or equal to 3% or a 10-year probability of major osteoporosis-related fracture greater than or equal to 20% based on the [place]-adapted WHO algorithm.  -- Clinician's judgment and/or patient preferences may indicate treatment for people with 10-year fracture probabilities above or below these levels.      Electronically  signed by: SAUNDRA TELLEZ MD  Date: 16  Time: 13:24     Encounter     View Encounter                       Other Visit Diagnoses     Abnormal CT scan of lung        Relevant Medications    albuterol 90 mcg/actuation inhaler    Hyperlipidemia, unspecified hyperlipidemia type        Relevant Medications    gemfibrozil (LOPID) 600 MG tablet    rosuvastatin (CRESTOR) 20 MG tablet    Other Relevant Orders    Lipid panel          Past Medical History:  Past Medical History:   Diagnosis Date    Abdominal aortic aneurysm     Bile duct obstruction     per pt/ has seen MD    Cancer of upper lobe of right lung 2018    She had a resection of her right upper lobe due to a cancer at [facility] and she reports that the nodes were all negative.   She is not needing chemo or radiation.     Cataract     Colon polyp     hyperplastic     COPD (chronic obstructive pulmonary disease)     Diplopia     DJD (degenerative joint disease), lumbar     HTN (hypertension)     Hyperlipidemia     Hypothyroidism     Obstruction of kidney     per pt/ pt does not know which kidney/ pt has appt to see nephrologist on 7/3    Osteopenia     Posterior vitreous detachment     Posterior vitreous detachment of right eye     Thyroiditis     positive thyroperoxidase antibodies    Trochanteric bursitis      Past Surgical History:   Procedure Laterality Date    CARPAL TUNNEL RELEASE      left     SECTION, CLASSIC      x2     Review of patient's allergies indicates:  No Known Allergies  Current Outpatient Prescriptions on File Prior to Visit   Medication Sig Dispense Refill    albuterol (ACCUNEB) 0.63 mg/3 mL Nebu USE 1 VIAL VIA NEBULIZER EVERY SIX HOURS AS NEEDED FOR WHEEZING OR SHORTNESS OF BREATH 90 mL 0    calcium carbonate (OS-PAPITO) 500 mg calcium (1,250 mg) tablet Take 3 tablets by mouth once daily.      clotrimazole-betamethasone 1-0.05% (LOTRISONE) cream Apply topically 2 (two) times daily. 45 g 0     fish oil-omega-3 fatty acids 300-1,000 mg capsule Take 2 g by mouth once daily.      fluticasone (FLONASE) 50 mcg/actuation nasal spray 2 sprays by Each Nare route once daily. 16 g 2    [DISCONTINUED] albuterol 90 mcg/actuation inhaler Inhale 2 puffs into the lungs every 6 (six) hours as needed for Wheezing. 8 g 0    [DISCONTINUED] gemfibrozil (LOPID) 600 MG tablet Take 1 tablet (600 mg total) by mouth 2 (two) times daily before meals. (Patient taking differently: Take 600 mg by mouth once daily. ) 60 tablet 11    [DISCONTINUED] levothyroxine (SYNTHROID) 75 MCG tablet Take 1 tablet (75 mcg total) by mouth once daily. 30 tablet 11    [DISCONTINUED] lisinopril (PRINIVIL,ZESTRIL) 40 MG tablet TAKE 1 TABLET (40 MG TOTAL) BY MOUTH ONCE DAILY. 90 tablet 3    [DISCONTINUED] metoprolol succinate (TOPROL-XL) 25 MG 24 hr tablet TAKE 1 TABLET (25 MG TOTAL) BY MOUTH ONCE DAILY. 90 tablet 3    [DISCONTINUED] rosuvastatin (CRESTOR) 20 MG tablet TAKE 1 TABLET (20 MG TOTAL) BY MOUTH ONCE DAILY. GIVE GENERIC BRAND 90 tablet 3    [DISCONTINUED] amlodipine (NORVASC) 10 MG tablet Take 1 tablet (10 mg total) by mouth once daily. 90 tablet 3    [DISCONTINUED] azithromycin (Z-JAROD) 250 MG tablet Take 2 tablets by mouth on day 1; Take 1 tablet by mouth on days 2-5 6 tablet 0     No current facility-administered medications on file prior to visit.      Social History     Social History    Marital status:      Spouse name: Ibrahima    Number of children: 2    Years of education: N/A     Occupational History    Not on file.     Social History Main Topics    Smoking status: Former Smoker     Packs/day: 1.00     Years: 45.00     Types: Cigarettes     Quit date: 1/1/2007    Smokeless tobacco: Never Used    Alcohol use No    Drug use: No    Sexual activity: Not on file     Other Topics Concern    Not on file     Social History Narrative    No narrative on file     Family History   Problem Relation Age of Onset     "Coronary artery disease Mother     Diabetes Mother     Amblyopia Neg Hx     Blindness Neg Hx     Cancer Neg Hx     Cataracts Neg Hx     Glaucoma Neg Hx     Hypertension Neg Hx     Macular degeneration Neg Hx     Retinal detachment Neg Hx     Strabismus Neg Hx     Stroke Neg Hx     Thyroid disease Neg Hx        Review of Systems   Constitutional: Negative for fatigue, fever and unexpected weight change.   HENT: Negative for congestion, ear pain, postnasal drip and sore throat.    Eyes: Negative for visual disturbance.   Respiratory: Negative for cough, chest tightness, shortness of breath and wheezing.    Cardiovascular: Negative for chest pain, palpitations and leg swelling.   Gastrointestinal: Negative for abdominal pain, blood in stool, constipation, diarrhea, nausea and vomiting.   Genitourinary: Negative for dysuria and hematuria.   Neurological: Negative for weakness and numbness.       Objective:     /63   Pulse 69   Ht 5' 1" (1.549 m)   Wt 61.9 kg (136 lb 6.4 oz)   BMI 25.77 kg/m²     Physical Exam   Constitutional: She appears well-developed and well-nourished. She is cooperative.   HENT:   Head: Normocephalic and atraumatic.   Right Ear: Tympanic membrane, external ear and ear canal normal.   Left Ear: Tympanic membrane, external ear and ear canal normal.   Nose: Nose normal.   Mouth/Throat: Uvula is midline and mucous membranes are normal. No oral lesions. No oropharyngeal exudate, posterior oropharyngeal edema or posterior oropharyngeal erythema.   Eyes: EOM and lids are normal. Pupils are equal, round, and reactive to light. Right eye exhibits no discharge. Left eye exhibits no discharge. Right conjunctiva is not injected. Right conjunctiva has no hemorrhage. Left conjunctiva is not injected. Left conjunctiva has no hemorrhage. No scleral icterus. Right eye exhibits no nystagmus. Left eye exhibits no nystagmus.   Neck: Normal range of motion and full passive range of motion without " pain. Neck supple. No JVD present. No tracheal tenderness present. Carotid bruit is present. No tracheal deviation present. No thyroid mass and no thyromegaly present.   Cardiovascular: Normal rate, regular rhythm, S1 normal and S2 normal.    No murmur heard.  Pulses:       Carotid pulses are 2+ on the right side, and 2+ on the left side.       Radial pulses are 2+ on the right side, and 2+ on the left side.        Posterior tibial pulses are 2+ on the right side, and 2+ on the left side.   Pulmonary/Chest: Effort normal and breath sounds normal. No respiratory distress. She has no wheezes. She has no rhonchi. She has no rales.   Abdominal: Soft. Normal appearance, normal aorta and bowel sounds are normal. She exhibits no distension, no abdominal bruit, no pulsatile midline mass and no mass. There is no hepatosplenomegaly. There is no tenderness. There is no rebound.   Musculoskeletal:        Right knee: She exhibits no swelling. No tenderness found.        Left knee: She exhibits no swelling. No tenderness found.   Lymphadenopathy:        Head (right side): No submental and no submandibular adenopathy present.        Head (left side): No submental and no submandibular adenopathy present.     She has no cervical adenopathy.   Neurological: She is alert. She has normal strength. No cranial nerve deficit or sensory deficit.   Skin: Skin is warm and dry. No rash noted. No cyanosis. Nails show no clubbing.   Psychiatric: She has a normal mood and affect. Her speech is normal and behavior is normal. Thought content normal. Cognition and memory are normal.     Assessment:     1. Acquired hypothyroidism    2. Dyslipidemia    3. Chronic obstructive pulmonary disease, unspecified COPD type    4. Essential hypertension    5. Osteopenia, unspecified location    6. Cancer of upper lobe of right lung    7. Ectopic atrial tachycardia    8. Abdominal aortic aneurysm (AAA) without rupture    9. Abnormal CT scan of lung    10.  Hyperlipidemia, unspecified hyperlipidemia type    11. Bilateral carotid bruits        Plan:     Problem List Items Addressed This Visit     Acquired hypothyroidism - Primary    Relevant Medications    levothyroxine (SYNTHROID) 75 MCG tablet    Other Relevant Orders    TSH    Aortic aneurysm    Relevant Orders    US Abdominal Aorta    Bilateral carotid bruits    Relevant Orders    US Carotid Bilateral    Cancer of upper lobe of right lung    COPD (chronic obstructive pulmonary disease)    Dyslipidemia (Chronic)    Relevant Medications    gemfibrozil (LOPID) 600 MG tablet    Other Relevant Orders    Lipid panel    Ectopic atrial tachycardia    Relevant Medications    diltiaZEM (CARDIZEM CD) 240 MG 24 hr capsule    metoprolol succinate (TOPROL-XL) 25 MG 24 hr tablet    HTN (hypertension)    Relevant Medications    diltiaZEM (CARDIZEM CD) 240 MG 24 hr capsule    lisinopril (PRINIVIL,ZESTRIL) 40 MG tablet    metoprolol succinate (TOPROL-XL) 25 MG 24 hr tablet    Other Relevant Orders    Comprehensive metabolic panel    Osteopenia (Chronic)      Other Visit Diagnoses     Abnormal CT scan of lung        Relevant Medications    albuterol 90 mcg/actuation inhaler    Hyperlipidemia, unspecified hyperlipidemia type        Relevant Medications    gemfibrozil (LOPID) 600 MG tablet    rosuvastatin (CRESTOR) 20 MG tablet    Other Relevant Orders    Lipid panel        No Follow-up on file.     Gloria was seen today for follow-up.    Diagnoses and all orders for this visit:    Acquired hypothyroidism  -     TSH; Future  -     levothyroxine (SYNTHROID) 75 MCG tablet; Take 1 tablet (75 mcg total) by mouth once daily.    Dyslipidemia  -     Lipid panel; Future  -     gemfibrozil (LOPID) 600 MG tablet; Take 1 tablet (600 mg total) by mouth once daily.    Chronic obstructive pulmonary disease, unspecified COPD type    Essential hypertension  -     Comprehensive metabolic panel; Future  -     diltiaZEM (CARDIZEM CD) 240 MG 24 hr capsule;  Take 1 capsule (240 mg total) by mouth once daily.  -     lisinopril (PRINIVIL,ZESTRIL) 40 MG tablet; Take 1 tablet (40 mg total) by mouth once daily.  -     metoprolol succinate (TOPROL-XL) 25 MG 24 hr tablet; Take 1 tablet (25 mg total) by mouth once daily.    Osteopenia, unspecified location    Cancer of upper lobe of right lung    Ectopic atrial tachycardia  -     diltiaZEM (CARDIZEM CD) 240 MG 24 hr capsule; Take 1 capsule (240 mg total) by mouth once daily.  -     metoprolol succinate (TOPROL-XL) 25 MG 24 hr tablet; Take 1 tablet (25 mg total) by mouth once daily.    Abdominal aortic aneurysm (AAA) without rupture  -     US Abdominal Aorta; Future    Abnormal CT scan of lung  -     albuterol 90 mcg/actuation inhaler; Inhale 2 puffs into the lungs every 6 (six) hours as needed for Wheezing.    Hyperlipidemia, unspecified hyperlipidemia type  -     Lipid panel; Future  -     gemfibrozil (LOPID) 600 MG tablet; Take 1 tablet (600 mg total) by mouth once daily.  -     rosuvastatin (CRESTOR) 20 MG tablet; Take 1 tablet (20 mg total) by mouth once daily. Give Generic Brand    Bilateral carotid bruits  -     US Carotid Bilateral; Future      Cont f/u with her lung specialists for her lung cancer follow up.

## 2018-02-27 ENCOUNTER — TELEPHONE (OUTPATIENT)
Dept: FAMILY MEDICINE | Facility: CLINIC | Age: 78
End: 2018-02-27

## 2018-02-27 DIAGNOSIS — E78.5 HYPERLIPIDEMIA, UNSPECIFIED HYPERLIPIDEMIA TYPE: ICD-10-CM

## 2018-02-27 RX ORDER — ROSUVASTATIN CALCIUM 40 MG/1
40 TABLET, COATED ORAL DAILY
Qty: 30 TABLET | Refills: 2 | Status: SHIPPED | OUTPATIENT
Start: 2018-02-27 | End: 2018-08-10 | Stop reason: SDUPTHER

## 2018-02-27 NOTE — TELEPHONE ENCOUNTER
----- Message from Theresa Lee sent at 2/27/2018 10:00 AM CST -----  Contact: pt  States she's returning a call. Please call pt at [phone]. Thank you

## 2018-02-27 NOTE — TELEPHONE ENCOUNTER
----- Message from Joseluis Norman MD sent at 2/27/2018  7:28 AM CST -----  The cholesterol has gone up.  The triglyceride is controlled.  I recommend that we stop the lopid (gemfibrozil) and increase the crestor to 40 mg po q day to address the LDL and decrease medication risks with the genfibrozil.  Recheck the lipid and liver in 3 months.     The electrolytes are all ok.  Recheck that in 1 year.   The thyroid is fine. Continue the current dose and recheck in 1 year.

## 2018-02-27 NOTE — PROGRESS NOTES
The cholesterol has gone up.  The triglyceride is controlled.  I recommend that we stop the lopid (gemfibrozil) and increase the crestor to 40 mg po q day to address the LDL and decrease medication risks with the genfibrozil.  Recheck the lipid and liver in 3 months.     The electrolytes are all ok.  Recheck that in 1 year.   The thyroid is fine. Continue the current dose and recheck in 1 year.

## 2018-02-27 NOTE — TELEPHONE ENCOUNTER
I have signed for the following orders AND/OR meds.  Please call the patient and ask the patient to schedule the testing AND/OR inform about any medications that were sent.      Orders Placed This Encounter   Procedures    Lipid panel     Standing Status:   Standing     Number of Occurrences:   99     Standing Expiration Date:   4/28/2037    Hepatic function panel     Standing Status:   Standing     Number of Occurrences:   99     Standing Expiration Date:   4/28/2037         Medications Ordered This Encounter      rosuvastatin (CRESTOR) 40 MG Tab          Sig: Take 1 tablet (40 mg total) by mouth once daily. Give Generic Brand          Dispense:  30 tablet          Refill:  2          Generic For:CRESTOR 20 MG TABLET  10/03/2017 8:51:49 AM PT REQUESTING REFILLS

## 2018-03-05 ENCOUNTER — TELEPHONE (OUTPATIENT)
Dept: RADIOLOGY | Facility: HOSPITAL | Age: 78
End: 2018-03-05

## 2018-03-06 ENCOUNTER — HOSPITAL ENCOUNTER (OUTPATIENT)
Dept: RADIOLOGY | Facility: HOSPITAL | Age: 78
Discharge: HOME | End: 2018-03-06
Attending: FAMILY MEDICINE
Payer: MEDICARE

## 2018-03-06 DIAGNOSIS — I71.40 ABDOMINAL AORTIC ANEURYSM (AAA) WITHOUT RUPTURE: ICD-10-CM

## 2018-03-06 DIAGNOSIS — R09.89 BILATERAL CAROTID BRUITS: ICD-10-CM

## 2018-03-06 PROCEDURE — 93880 EXTRACRANIAL BILAT STUDY: CPT | Mod: TC,PO

## 2018-03-06 PROCEDURE — 93880 EXTRACRANIAL BILAT STUDY: CPT | Mod: 26,,, | Performed by: RADIOLOGY

## 2018-03-06 PROCEDURE — 76775 US EXAM ABDO BACK WALL LIM: CPT | Mod: 26,,, | Performed by: RADIOLOGY

## 2018-03-06 PROCEDURE — 76775 US EXAM ABDO BACK WALL LIM: CPT | Mod: TC,PO

## 2018-03-08 ENCOUNTER — TELEPHONE (OUTPATIENT)
Dept: FAMILY MEDICINE | Facility: CLINIC | Age: 78
End: 2018-03-08

## 2018-03-08 DIAGNOSIS — I71.9 AORTIC ANEURYSM WITHOUT RUPTURE, UNSPECIFIED PORTION OF AORTA: Primary | ICD-10-CM

## 2018-03-08 NOTE — TELEPHONE ENCOUNTER
I have signed for the following orders AND/OR meds.  Please call the patient and ask the patient to schedule the testing AND/OR inform about any medications that were sent.      Orders Placed This Encounter   Procedures    US Abdominal Aorta     Standing Status:   Future     Standing Expiration Date:   3/8/2019     Order Specific Question:   May the Radiologist modify the order per protocol to meet the clinical needs of the patient?     Answer:   Yes

## 2018-03-14 ENCOUNTER — OFFICE VISIT (OUTPATIENT)
Dept: FAMILY MEDICINE | Facility: CLINIC | Age: 78
End: 2018-03-14
Payer: MEDICARE

## 2018-03-14 ENCOUNTER — HOSPITAL ENCOUNTER (OUTPATIENT)
Dept: RADIOLOGY | Facility: HOSPITAL | Age: 78
Discharge: HOME | End: 2018-03-14
Attending: NURSE PRACTITIONER
Payer: MEDICARE

## 2018-03-14 VITALS
BODY MASS INDEX: 26.06 KG/M2 | HEIGHT: 61 IN | OXYGEN SATURATION: 97 % | WEIGHT: 138 LBS | TEMPERATURE: 98 F | DIASTOLIC BLOOD PRESSURE: 68 MMHG | SYSTOLIC BLOOD PRESSURE: 156 MMHG | HEART RATE: 66 BPM

## 2018-03-14 DIAGNOSIS — R06.02 SOB (SHORTNESS OF BREATH): ICD-10-CM

## 2018-03-14 DIAGNOSIS — R09.81 NASAL CONGESTION: ICD-10-CM

## 2018-03-14 DIAGNOSIS — R05.9 COUGH: ICD-10-CM

## 2018-03-14 DIAGNOSIS — J06.9 UPPER RESPIRATORY TRACT INFECTION, UNSPECIFIED TYPE: Primary | ICD-10-CM

## 2018-03-14 PROCEDURE — 99213 OFFICE O/P EST LOW 20 MIN: CPT | Mod: S$PBB,,, | Performed by: NURSE PRACTITIONER

## 2018-03-14 PROCEDURE — 71046 X-RAY EXAM CHEST 2 VIEWS: CPT | Mod: TC,PO

## 2018-03-14 PROCEDURE — 71046 X-RAY EXAM CHEST 2 VIEWS: CPT | Mod: 26,,, | Performed by: RADIOLOGY

## 2018-03-14 PROCEDURE — 99214 OFFICE O/P EST MOD 30 MIN: CPT | Mod: PBBFAC,25,PO | Performed by: NURSE PRACTITIONER

## 2018-03-14 PROCEDURE — 99999 PR PBB SHADOW E&M-EST. PATIENT-LVL IV: CPT | Mod: PBBFAC,,, | Performed by: NURSE PRACTITIONER

## 2018-03-14 RX ORDER — METHYLPREDNISOLONE 4 MG/1
TABLET ORAL
Qty: 1 PACKAGE | Refills: 0 | Status: SHIPPED | OUTPATIENT
Start: 2018-03-14 | End: 2018-03-23

## 2018-03-14 RX ORDER — BENZONATATE 200 MG/1
200 CAPSULE ORAL 3 TIMES DAILY PRN
Qty: 30 CAPSULE | Refills: 0 | Status: SHIPPED | OUTPATIENT
Start: 2018-03-14 | End: 2018-03-24

## 2018-03-14 NOTE — PROGRESS NOTES
Subjective:       Patient ID: Gloria Hale is a 77 y.o. female.    Chief Complaint: Chest Congestion; Nasal Congestion; Sore Throat; and Cough    Cough   This is a new problem. The current episode started yesterday. The problem has been unchanged. The problem occurs every few minutes. The cough is productive of sputum. Associated symptoms include nasal congestion, postnasal drip, rhinorrhea, a sore throat and shortness of breath. Pertinent negatives include no chest pain, chills, ear congestion, ear pain, fever, headaches, heartburn, hemoptysis, myalgias, rash, sweats, weight loss or wheezing. Nothing aggravates the symptoms. She has tried a beta-agonist inhaler for the symptoms. The treatment provided mild relief. Her past medical history is significant for COPD and pneumonia. There is no history of asthma, bronchiectasis, bronchitis, emphysema or environmental allergies. Upper lobe of right lung removed in November due to cancer     Past Medical History:   Diagnosis Date    Abdominal aortic aneurysm     Bile duct obstruction     per pt/ has seen MD    Cancer of upper lobe of right lung 2/26/2018    She had a resection of her right upper lobe due to a cancer at [facility] and she reports that the nodes were all negative.   She is not needing chemo or radiation.     Cataract     Colon polyp     hyperplastic 2007    COPD (chronic obstructive pulmonary disease)     Diplopia     DJD (degenerative joint disease), lumbar     HTN (hypertension)     Hyperlipidemia     Hypothyroidism     Obstruction of kidney     per pt/ pt does not know which kidney/ pt has appt to see nephrologist on 7/3    Osteopenia     Posterior vitreous detachment     Posterior vitreous detachment of right eye     Thyroiditis     positive thyroperoxidase antibodies    Trochanteric bursitis      Social History     Social History    Marital status:      Spouse name: Ibrahima    Number of children: 2    Years of  education: N/A     Occupational History    Not on file.     Social History Main Topics    Smoking status: Former Smoker     Packs/day: 1.00     Years: 45.00     Types: Cigarettes     Quit date: 2007    Smokeless tobacco: Never Used    Alcohol use No    Drug use: No    Sexual activity: Not on file     Social History Narrative    No narrative on file     Past Surgical History:   Procedure Laterality Date    CARPAL TUNNEL RELEASE      left     SECTION, CLASSIC      x2       Review of Systems   Constitutional: Negative.  Negative for chills, fever and weight loss.   HENT: Positive for postnasal drip, rhinorrhea and sore throat. Negative for ear pain.    Eyes: Negative.    Respiratory: Positive for cough and shortness of breath. Negative for hemoptysis and wheezing.    Cardiovascular: Negative.  Negative for chest pain.   Gastrointestinal: Negative.  Negative for heartburn.   Endocrine: Negative.    Genitourinary: Negative.    Musculoskeletal: Negative.  Negative for myalgias.   Skin: Negative.  Negative for rash.   Allergic/Immunologic: Negative.  Negative for environmental allergies.   Neurological: Negative.  Negative for headaches.   Psychiatric/Behavioral: Negative.        Objective:      Physical Exam   Constitutional: She is oriented to person, place, and time. She appears well-developed and well-nourished.   HENT:   Head: Normocephalic.   Right Ear: External ear normal.   Left Ear: External ear normal.   Mouth/Throat: Oropharynx is clear and moist.   Eyes: Conjunctivae are normal. Pupils are equal, round, and reactive to light.   Neck: Normal range of motion. Neck supple.   Cardiovascular: Normal rate, regular rhythm and normal heart sounds.    Pulmonary/Chest: Effort normal. She has decreased breath sounds (Lobe of right upper lobe removed) in the right upper field.   Abdominal: Soft. Bowel sounds are normal.   Musculoskeletal: Normal range of motion.   Neurological: She is alert and  oriented to person, place, and time.   Skin: Skin is warm and dry. Capillary refill takes 2 to 3 seconds.   Psychiatric: She has a normal mood and affect. Her behavior is normal. Judgment and thought content normal.   Nursing note and vitals reviewed.      Assessment:       1. Cough    2. SOB (shortness of breath)    3. Nasal congestion    4.      Upper respiratory tract infection, unspecified type      Plan:           Gloria was seen today for chest congestion, nasal congestion, sore throat and cough.    Diagnoses and all orders for this visit:    Upper respiratory tract infection, unspecified type  Cough  SOB (shortness of breath)  Nasal congestion  -     X-Ray Chest PA And Lateral; Future  -     methylPREDNISolone (MEDROL DOSEPACK) 4 mg tablet; use as directed  -     benzonatate (TESSALON) 200 MG capsule; Take 1 capsule (200 mg total) by mouth 3 (three) times daily as needed.  Inhalers as prescribed        Report to ER immediately if symptoms worsen

## 2018-03-23 ENCOUNTER — HOSPITAL ENCOUNTER (OUTPATIENT)
Dept: RADIOLOGY | Facility: HOSPITAL | Age: 78
Discharge: HOME | End: 2018-03-23
Attending: NURSE PRACTITIONER
Payer: MEDICARE

## 2018-03-23 ENCOUNTER — OFFICE VISIT (OUTPATIENT)
Dept: FAMILY MEDICINE | Facility: CLINIC | Age: 78
End: 2018-03-23
Payer: MEDICARE

## 2018-03-23 VITALS
WEIGHT: 138 LBS | BODY MASS INDEX: 26.06 KG/M2 | DIASTOLIC BLOOD PRESSURE: 55 MMHG | HEART RATE: 75 BPM | OXYGEN SATURATION: 93 % | SYSTOLIC BLOOD PRESSURE: 91 MMHG | TEMPERATURE: 98 F | HEIGHT: 61 IN

## 2018-03-23 DIAGNOSIS — R05.9 COUGH: ICD-10-CM

## 2018-03-23 DIAGNOSIS — K59.00 CONSTIPATION, UNSPECIFIED CONSTIPATION TYPE: ICD-10-CM

## 2018-03-23 DIAGNOSIS — J20.9 ACUTE BRONCHITIS, UNSPECIFIED ORGANISM: Primary | ICD-10-CM

## 2018-03-23 DIAGNOSIS — R06.02 SOB (SHORTNESS OF BREATH): ICD-10-CM

## 2018-03-23 DIAGNOSIS — J44.9 CHRONIC OBSTRUCTIVE PULMONARY DISEASE, UNSPECIFIED COPD TYPE: ICD-10-CM

## 2018-03-23 DIAGNOSIS — Z85.118 HISTORY OF LUNG CANCER: ICD-10-CM

## 2018-03-23 PROCEDURE — 99999 PR PBB SHADOW E&M-EST. PATIENT-LVL III: CPT | Mod: PBBFAC,,, | Performed by: NURSE PRACTITIONER

## 2018-03-23 PROCEDURE — 99213 OFFICE O/P EST LOW 20 MIN: CPT | Mod: PBBFAC,PO | Performed by: NURSE PRACTITIONER

## 2018-03-23 PROCEDURE — 99213 OFFICE O/P EST LOW 20 MIN: CPT | Mod: S$PBB,,, | Performed by: NURSE PRACTITIONER

## 2018-03-23 RX ORDER — GUAIFENESIN 600 MG/1
600 TABLET, EXTENDED RELEASE ORAL
COMMUNITY
Start: 2018-03-20 | End: 2018-06-06 | Stop reason: SDUPTHER

## 2018-03-23 RX ORDER — ALBUTEROL SULFATE 1.25 MG/3ML
1.25 SOLUTION RESPIRATORY (INHALATION)
Status: DISCONTINUED | OUTPATIENT
Start: 2018-03-23 | End: 2018-03-23

## 2018-03-23 RX ORDER — IPRATROPIUM BROMIDE 0.5 MG/2.5ML
0.5 SOLUTION RESPIRATORY (INHALATION)
Status: COMPLETED | OUTPATIENT
Start: 2018-03-23 | End: 2018-03-23

## 2018-03-23 RX ORDER — LEVOFLOXACIN 500 MG/1
500 TABLET, FILM COATED ORAL
COMMUNITY
Start: 2018-03-20 | End: 2018-03-26

## 2018-03-23 RX ORDER — PROMETHAZINE HYDROCHLORIDE AND DEXTROMETHORPHAN HYDROBROMIDE 6.25; 15 MG/5ML; MG/5ML
5 SYRUP ORAL
Status: ON HOLD | COMMUNITY
Start: 2018-03-20 | End: 2018-03-29 | Stop reason: HOSPADM

## 2018-03-23 RX ADMIN — IPRATROPIUM BROMIDE 0.5 MG: 0.5 SOLUTION RESPIRATORY (INHALATION) at 12:03

## 2018-03-23 NOTE — PROGRESS NOTES
"Subjective:      Patient ID: Gloria Hale is a 77 y.o. female.    Chief Complaint: ER f/u (congestion, SOB)    HPI   Patient with history of right lung cancer, s/p RUL resection, presents today for complaint of worsening shortness of breath and cough.  She was treated for URI 3/14/2018 with medrol dose pack and tessalon but she worsened and went to [facility].  She was treated there on 3/17/2018 for bronchitis with doxycycline, decadron injection, and encouraged Mucinex use.  Despite this, patient still worsened and had to go to [facility].  She was treated there 3/20/2018 for acute bronchitis and antibiotic was changed from doxy to Levaquin.  She has been using cough syrup, Mucinex, and albuterol treatments and she has not had any improvement.  She is reporting significant weakness and fatigue, increase in SOB, no appetite.  She denies fever.  She also reports she has not had a bowel movement in 8 days because of her decreased oral intake.  She does report drinking some Boost for supplements.  She reports she feels so bad that she feels she needs to be hospitalized.  She did albuterol neb just prior to coming here.    Review of Systems   Constitutional: Positive for activity change, appetite change and fatigue. Negative for chills and fever.   HENT: Positive for postnasal drip and rhinorrhea. Negative for congestion, sinus pain, sinus pressure and sore throat.    Respiratory: Positive for cough and shortness of breath. Negative for wheezing.    Cardiovascular: Negative for chest pain, palpitations and leg swelling.   Gastrointestinal: Positive for constipation and nausea. Negative for abdominal distention.   Skin: Negative for rash and wound.   Neurological: Positive for weakness. Negative for numbness and headaches.   Psychiatric/Behavioral: The patient is not nervous/anxious.        Objective:     BP (!) 91/55   Pulse 75   Temp 98.1 °F (36.7 °C) (Oral)   Ht 5' 1" (1.549 m)   Wt 62.6 kg (138 lb)   " SpO2 (!) 93%   BMI 26.07 kg/m²     Physical Exam   Constitutional: She is oriented to person, place, and time. She appears well-developed and well-nourished. She appears ill.   HENT:   Head: Normocephalic.   Eyes: Pupils are equal, round, and reactive to light.   Cardiovascular: Normal rate, regular rhythm and normal heart sounds.    Pulmonary/Chest: Effort normal. No respiratory distress. She has decreased breath sounds (decreased inspiratory breath sounds; decreased breath sounds to BLL). She has no wheezes. She has rhonchi (faint, scattered rhonchi). She has no rales.   Lymphadenopathy:     She has no cervical adenopathy.   Neurological: She is alert and oriented to person, place, and time.   Skin: Skin is warm and dry. No rash noted.   Psychiatric: She has a normal mood and affect. Her behavior is normal. Judgment and thought content normal.   Vitals reviewed.    Assessment:     1. Acute bronchitis, unspecified organism    2. SOB (shortness of breath)    3. Cough    4. Constipation, unspecified constipation type    5. Chronic obstructive pulmonary disease, unspecified COPD type    6. History of lung cancer        Plan:     Problem List Items Addressed This Visit        Pulmonary    COPD (chronic obstructive pulmonary disease)    Relevant Medications    ipratropium 0.02 % nebulizer solution 0.5 mg (Completed)    Other Relevant Orders    X-Ray Chest PA And Lateral      Other Visit Diagnoses     Acute bronchitis, unspecified organism    -  Primary    Relevant Medications    ipratropium 0.02 % nebulizer solution 0.5 mg (Completed)    Other Relevant Orders    X-Ray Chest PA And Lateral    SOB (shortness of breath)        Relevant Medications    ipratropium 0.02 % nebulizer solution 0.5 mg (Completed)    Other Relevant Orders    X-Ray Chest PA And Lateral    Cough        Relevant Medications    ipratropium 0.02 % nebulizer solution 0.5 mg (Completed)    Other Relevant Orders    X-Ray Chest PA And Lateral     Constipation, unspecified constipation type        History of lung cancer        Relevant Medications    ipratropium 0.02 % nebulizer solution 0.5 mg (Completed)    Other Relevant Orders    X-Ray Chest PA And Lateral      O2 Sat did not improve after treatment.  Still only 91%-93%.    Patient's symptoms have worsened despite outpatient treatment.  Given her history, age, and decline in status, Dr. Norman and I agreed that she should be hospitalized.  Patient agrees with plan.  Report called to Ghada at [facility].  CXR held due to patient going to ER    Follow-up if symptoms worsen or fail to improve, for f/u upon discharge from hospital.

## 2018-03-26 ENCOUNTER — OFFICE VISIT (OUTPATIENT)
Dept: FAMILY MEDICINE | Facility: CLINIC | Age: 78
End: 2018-03-26
Payer: MEDICARE

## 2018-03-26 VITALS
HEART RATE: 75 BPM | WEIGHT: 136 LBS | DIASTOLIC BLOOD PRESSURE: 66 MMHG | HEIGHT: 61 IN | BODY MASS INDEX: 25.68 KG/M2 | TEMPERATURE: 98 F | SYSTOLIC BLOOD PRESSURE: 118 MMHG

## 2018-03-26 DIAGNOSIS — R53.1 WEAKNESS: ICD-10-CM

## 2018-03-26 DIAGNOSIS — K59.00 CONSTIPATION, UNSPECIFIED CONSTIPATION TYPE: ICD-10-CM

## 2018-03-26 DIAGNOSIS — R11.0 NAUSEA: ICD-10-CM

## 2018-03-26 DIAGNOSIS — J44.9 CHRONIC OBSTRUCTIVE PULMONARY DISEASE, UNSPECIFIED COPD TYPE: Primary | ICD-10-CM

## 2018-03-26 PROBLEM — K59.09 OTHER CONSTIPATION: Status: ACTIVE | Noted: 2018-03-26

## 2018-03-26 PROBLEM — R11.14 BILIOUS VOMITING WITH NAUSEA: Status: ACTIVE | Noted: 2018-03-26

## 2018-03-26 PROBLEM — N17.9 AKI (ACUTE KIDNEY INJURY): Status: ACTIVE | Noted: 2018-03-26

## 2018-03-26 PROBLEM — R01.1 SYSTOLIC MURMUR: Status: ACTIVE | Noted: 2018-03-26

## 2018-03-26 PROCEDURE — 99213 OFFICE O/P EST LOW 20 MIN: CPT | Mod: S$PBB,,, | Performed by: NURSE PRACTITIONER

## 2018-03-26 PROCEDURE — 99213 OFFICE O/P EST LOW 20 MIN: CPT | Mod: PBBFAC,PO | Performed by: NURSE PRACTITIONER

## 2018-03-26 PROCEDURE — 99999 PR PBB SHADOW E&M-EST. PATIENT-LVL III: CPT | Mod: PBBFAC,,, | Performed by: NURSE PRACTITIONER

## 2018-03-26 RX ORDER — ONDANSETRON 4 MG/1
4 TABLET, ORALLY DISINTEGRATING ORAL
Status: ON HOLD | COMMUNITY
Start: 2018-03-23 | End: 2018-03-29

## 2018-03-26 RX ORDER — ALBUTEROL SULFATE 0.63 MG/3ML
SOLUTION RESPIRATORY (INHALATION)
Qty: 90 ML | Refills: 0 | Status: ON HOLD | OUTPATIENT
Start: 2018-03-26 | End: 2018-03-29

## 2018-03-26 NOTE — PROGRESS NOTES
"Subjective:      Patient ID: Gloria Hale is a 77 y.o. female.    Chief Complaint: Generalized Body Aches    HPI   Patient to clinic with complaint of anorexia, nausea, vomiting with food, and constipation.  She was sent to ER Friday for hypoxia and worsening of bronchitis.  She was seen at [facility] and her CXR actually demonstrated improvement per their record and they felt she had a viral URI with prolonged recovery and sent her home.  She reports her cough and shortness of breath have improved, but she is becoming increasingly weaker.  She reports she is needing a walker or wheelchair to get around because she is so weak that she cannot walk unassisted.  She reports she has been able to tolerate fluids, but any time she eats food, she vomits.  She also has not had a BM in 10 days.  Patient is asking if she can be admitted to a LTAC center.    Review of Systems   Constitutional: Positive for appetite change and fatigue. Negative for chills and fever.   Respiratory: Positive for cough (improving), shortness of breath (improving) and wheezing (improving).    Cardiovascular: Negative for chest pain, palpitations and leg swelling.   Gastrointestinal: Positive for constipation, nausea and vomiting. Negative for abdominal distention and diarrhea.   Skin: Negative for rash and wound.   Neurological: Negative for weakness and numbness.   Psychiatric/Behavioral: The patient is not nervous/anxious.        Objective:     /66   Pulse 75   Temp 97.9 °F (36.6 °C) (Oral)   Ht 5' 1" (1.549 m)   Wt 61.7 kg (136 lb)   BMI 25.70 kg/m²     Physical Exam   Constitutional: She is oriented to person, place, and time. She appears ill.   HENT:   Head: Normocephalic.   Eyes: Pupils are equal, round, and reactive to light.   Cardiovascular: Normal rate, regular rhythm and normal heart sounds.    Pulmonary/Chest: Effort normal. No respiratory distress. She has no decreased breath sounds. She has no wheezes. She has rhonchi " (scattered rhonchi). She has no rales.   Abdominal: Soft. Normal appearance and bowel sounds are normal. There is tenderness (mild tenderness reported to right side of abd) in the right upper quadrant and right lower quadrant. There is no rigidity, no rebound, no guarding and no CVA tenderness.   Lymphadenopathy:     She has no cervical adenopathy.   Neurological: She is alert and oriented to person, place, and time.   Skin: Skin is warm and dry. No rash noted.   Psychiatric: She has a normal mood and affect. Her behavior is normal. Judgment and thought content normal.   Vitals reviewed.    Assessment:     1. Chronic obstructive pulmonary disease, unspecified COPD type    2. Constipation, unspecified constipation type    3. Nausea    4. Weakness        Plan:     Problem List Items Addressed This Visit        Pulmonary    COPD (chronic obstructive pulmonary disease) - Primary      Other Visit Diagnoses     Constipation, unspecified constipation type        Nausea        Weakness          discussed with Dr. Norman patient's condition.  He was contacted about patient from [facility].  He feels the patient needs stat CMP and treatment possibly with IV fluids and should be evaluated at an Er.  Patient does not wish to go back to [facility] and has chosen to go to [facility] for evaluation and treatment.      Follow-up if symptoms worsen or fail to improve.

## 2018-05-25 ENCOUNTER — LAB VISIT (OUTPATIENT)
Dept: LAB | Facility: HOSPITAL | Age: 78
End: 2018-05-25
Attending: FAMILY MEDICINE
Payer: MEDICARE

## 2018-05-25 DIAGNOSIS — E78.5 HYPERLIPIDEMIA, UNSPECIFIED HYPERLIPIDEMIA TYPE: ICD-10-CM

## 2018-05-25 LAB
ALBUMIN SERPL BCP-MCNC: 3.8 G/DL
ALP SERPL-CCNC: 90 U/L
ALT SERPL W/O P-5'-P-CCNC: 11 U/L
AST SERPL-CCNC: 15 U/L
BILIRUB DIRECT SERPL-MCNC: 0.2 MG/DL
BILIRUB SERPL-MCNC: 0.4 MG/DL
CHOLEST SERPL-MCNC: 154 MG/DL
CHOLEST/HDLC SERPL: 3.5 {RATIO}
HDLC SERPL-MCNC: 44 MG/DL
HDLC SERPL: 28.6 %
LDLC SERPL CALC-MCNC: 71.4 MG/DL
NONHDLC SERPL-MCNC: 110 MG/DL
PROT SERPL-MCNC: 7.8 G/DL
TRIGL SERPL-MCNC: 193 MG/DL

## 2018-05-25 PROCEDURE — 36415 COLL VENOUS BLD VENIPUNCTURE: CPT | Mod: PO

## 2018-05-25 PROCEDURE — 80061 LIPID PANEL: CPT

## 2018-05-25 PROCEDURE — 80076 HEPATIC FUNCTION PANEL: CPT

## 2018-06-06 ENCOUNTER — HOSPITAL ENCOUNTER (OUTPATIENT)
Dept: RADIOLOGY | Facility: HOSPITAL | Age: 78
Discharge: HOME | End: 2018-06-06
Attending: FAMILY MEDICINE
Payer: MEDICARE

## 2018-06-06 ENCOUNTER — OFFICE VISIT (OUTPATIENT)
Dept: FAMILY MEDICINE | Facility: CLINIC | Age: 78
End: 2018-06-06
Payer: MEDICARE

## 2018-06-06 VITALS
SYSTOLIC BLOOD PRESSURE: 132 MMHG | HEART RATE: 69 BPM | BODY MASS INDEX: 24.48 KG/M2 | WEIGHT: 133 LBS | OXYGEN SATURATION: 98 % | TEMPERATURE: 98 F | HEIGHT: 62 IN | DIASTOLIC BLOOD PRESSURE: 78 MMHG

## 2018-06-06 DIAGNOSIS — R05.9 COUGH: Primary | ICD-10-CM

## 2018-06-06 DIAGNOSIS — R05.9 COUGH: ICD-10-CM

## 2018-06-06 DIAGNOSIS — C34.11 CANCER OF UPPER LOBE OF RIGHT LUNG: ICD-10-CM

## 2018-06-06 PROCEDURE — 99999 PR PBB SHADOW E&M-EST. PATIENT-LVL IV: CPT | Mod: PBBFAC,,, | Performed by: FAMILY MEDICINE

## 2018-06-06 PROCEDURE — 71046 X-RAY EXAM CHEST 2 VIEWS: CPT | Mod: 26,,, | Performed by: RADIOLOGY

## 2018-06-06 PROCEDURE — 99214 OFFICE O/P EST MOD 30 MIN: CPT | Mod: S$PBB,,, | Performed by: FAMILY MEDICINE

## 2018-06-06 PROCEDURE — 99214 OFFICE O/P EST MOD 30 MIN: CPT | Mod: PBBFAC,25,PO | Performed by: FAMILY MEDICINE

## 2018-06-06 PROCEDURE — 71046 X-RAY EXAM CHEST 2 VIEWS: CPT | Mod: TC,PO

## 2018-06-06 RX ORDER — PREDNISONE 5 MG/1
TABLET ORAL
Qty: 45 TABLET | Refills: 0 | Status: SHIPPED | OUTPATIENT
Start: 2018-06-06 | End: 2018-07-26

## 2018-06-06 RX ORDER — GUAIFENESIN 600 MG/1
600 TABLET, EXTENDED RELEASE ORAL 2 TIMES DAILY
Qty: 20 TABLET | Refills: 0 | COMMUNITY
Start: 2018-06-06

## 2018-06-06 RX ORDER — CODEINE PHOSPHATE AND GUAIFENESIN 10; 100 MG/5ML; MG/5ML
10 SOLUTION ORAL 4 TIMES DAILY PRN
Qty: 240 ML | Refills: 0 | Status: SHIPPED | OUTPATIENT
Start: 2018-06-06 | End: 2018-06-16

## 2018-06-25 NOTE — PROGRESS NOTES
Subjective:      Patient ID: Gloria Hale is a 77 y.o. female.    Chief Complaint: Follow-up and Cyst    HPI   Subjective:      Patient ID: Gloria Hale is a 77 y.o. female.     Chief Complaint: Follow-up and Cyst     HPI  Dyspnea: Patient complains of shortness of breath with exertion.  Symptoms include constant cough, shortness of breath and sputum production. Symptoms began 2 weeks ago, gradually improving since that time.  Patient denies fever. Associated symptoms include nasal congestion . Patient has not had recent travel.  Weight has been stable.  Appetite has been unchanged. Symptoms are exacerbated by exertion. Symptoms are alleviated partially by rest.      She was seen in the pulmonary clinic on 6/4 for this and the following was the impression and plan.        Radiographs:    PFTs from 9/2017 was reviewed. FVC was 1.9 L which is 88% of predicted. FEV1 is 1 L which is 67% of predicted. Ratio is 56. Total lung capacity is 6.12L which is 155% of predicted. DLCO is 55% of predicted. Overall moderate obstructive lung disease noted.     CT of the chest from 8/2017 was reviewed. Right upper lobe nodule with some groundglass opacities surrounding the nodule noted. Trace groundglass opacity noted in the superior segment of the right lower lobe. No other abnormalities appreciated.    PET/CT scan from 10/2017 was reviewed. No evidence of metastatic disease. Hypermetabolic right upper lobe nodules noted.    CXR from 3/23/18 reviewed. Surgical changes of the right lung without acute abnormality identified     CT of the chest dated 4/10/2018 was reviewed. Previously noted groundglass opacity in the right lower lobe appears to be stable. No other abnormalities noted.     Assessment     1. Moderate COPD (chronic obstructive pulmonary disease) (HCC)   2. Adenocarcinoma of right lung (HCC)   3. Dysphagia, unspecified type   4. Cigarette nicotine dependence in remission         Plan    is a pleasant 77-year-old   female who presents to the clinic today for c/o dyspnea. She reports it is intermittent in nature and seems exertional based on history.     Moderate COPD - her preoperative PFTs were suggestive of moderate obstructive lung disease. She does have chronic dyspnea on exertion for which it has been recommended that she use ANORO on a daily basis as a maintenance inhaler but the patient is adamant that she did not want to use any maintenance inhalers but at this point it seems to be a cost issue and she is agreeable to attempt another inhaler. Samples for 4 weeks of Incruse provided to her, instructed to use inhaler the same way she was using Anoro. Importance of daily activity was also recommended, discussed pulmonary rehab which she will consider. Continue nebulizer therapy and rescue inhaler as needed. Repeat PFTs planned for October 2018. Given her RUL resection it is plausible that her worsening dyspnea/fatigueability could be related to loss of her prior RUL function as well as some deconditioning since her surgery.     Right lung nodule/ adenocarcinoma of the lung-biopsy proven adenocarcinoma and status post resection of right upper lobe in November 2017. This would be deemed a stage I cancer. Given her abnormal CT scan in the past with suggestion of some trace opacity in the superior segment of the right lower lobe and repeat CT was performed that is noted to be stable. Plans for repeat CT chest planned by Dr. Bryan as previously ordered for October 2018.    Dysphagia - she reports frequently getting choked on food. Referral to GI for potential EGD with dilation. Discussed that if she has a stricture she is at increased risk for aspiration related events.     Cigarette nicotine dependence - in remission. Lifelong cessation counseled.     She is up-to-date with pneumonia vaccination (Prevnar). Need for annual flu vaccine during flu season recommended. Return to see Dr. Bryan in 4 weeks given addition  of new medication/symptoms. Plans for PFT/imaging as planned later this year in October. She is agreeable to plan and verbalized understanding.     I have spent 30 minutes of face-to-face time with this patient. More than 50% of the time was involved in counseling/education/discussions regarding his medical conditions, new inhaler, continued smoking cessation, review of hospital records, chest x-ray/CT/PET/PFTs, as well as plans for further evaluation, GI referral, and follow-up.     Orders Placed This Encounter    Ambulatory referral to Gastroenterology   JENNIFER JORGENSEN NP        Health Maintenance Due   Topic Date Due    Zoster Vaccine  1998       Past Medical History:  Past Medical History:   Diagnosis Date    Abdominal aortic aneurysm     Bile duct obstruction     per pt/ has seen MD    Cancer of upper lobe of right lung 2018    She had a resection of her right upper lobe due to a cancer at [facility] and she reports that the nodes were all negative.   She is not needing chemo or radiation.     Cataract     Colon polyp     hyperplastic     COPD (chronic obstructive pulmonary disease)     Diplopia     DJD (degenerative joint disease), lumbar     HTN (hypertension)     Hyperlipidemia     Hypothyroidism     Obstruction of kidney     per pt/ pt does not know which kidney/ pt has appt to see nephrologist on 7/3    Osteopenia     Posterior vitreous detachment     Posterior vitreous detachment of right eye     Thyroiditis     positive thyroperoxidase antibodies    Trochanteric bursitis      Past Surgical History:   Procedure Laterality Date    CARPAL TUNNEL RELEASE      left     SECTION, CLASSIC      x2     Review of patient's allergies indicates:  No Known Allergies  Current Outpatient Prescriptions on File Prior to Visit   Medication Sig Dispense Refill    albuterol (ACCUNEB) 0.63 mg/3 mL Nebu USE 1 VIAL VIA NEBULIZER EVERY SIX HOURS AS NEEDED FOR WHEEZING OR  SHORTNESS OF BREATH 90 mL 0    albuterol 90 mcg/actuation inhaler Inhale 2 puffs into the lungs every 6 (six) hours as needed for Wheezing. 8 g 11    calcium carbonate (OS-PAPITO) 500 mg calcium (1,250 mg) tablet Take 3 tablets by mouth once daily.      diltiaZEM (CARDIZEM CD) 240 MG 24 hr capsule Take 1 capsule (240 mg total) by mouth once daily. 90 capsule 03    fish oil-omega-3 fatty acids 300-1,000 mg capsule Take 2 g by mouth once daily.      fluticasone (FLONASE) 50 mcg/actuation nasal spray 2 sprays by Each Nare route once daily. 16 g 2    levothyroxine (SYNTHROID) 75 MCG tablet Take 1 tablet (75 mcg total) by mouth once daily. 90 tablet 3    metoprolol succinate (TOPROL-XL) 25 MG 24 hr tablet Take 1 tablet (25 mg total) by mouth once daily. 90 tablet 3    rosuvastatin (CRESTOR) 40 MG Tab Take 1 tablet (40 mg total) by mouth once daily. Give Generic Brand 30 tablet 2     No current facility-administered medications on file prior to visit.      Social History     Social History    Marital status:      Spouse name: Ibrahima    Number of children: 2    Years of education: N/A     Occupational History    Not on file.     Social History Main Topics    Smoking status: Former Smoker     Packs/day: 1.00     Years: 45.00     Types: Cigarettes     Quit date: 1/1/2007    Smokeless tobacco: Never Used    Alcohol use No    Drug use: No    Sexual activity: Not on file     Other Topics Concern    Not on file     Social History Narrative    No narrative on file     Family History   Problem Relation Age of Onset    Coronary artery disease Mother     Diabetes Mother     Amblyopia Neg Hx     Blindness Neg Hx     Cancer Neg Hx     Cataracts Neg Hx     Glaucoma Neg Hx     Hypertension Neg Hx     Macular degeneration Neg Hx     Retinal detachment Neg Hx     Strabismus Neg Hx     Stroke Neg Hx     Thyroid disease Neg Hx              Review of Systems   Constitutional: Negative for fatigue, fever  "and unexpected weight change.   HENT: Negative for congestion, ear pain, postnasal drip and sore throat.    Eyes: Negative for visual disturbance.   Respiratory: Positive for cough. Negative for chest tightness, shortness of breath and wheezing.    Cardiovascular: Negative for chest pain, palpitations and leg swelling.   Gastrointestinal: Negative for abdominal pain, blood in stool, constipation, diarrhea, nausea and vomiting.   Genitourinary: Negative for dysuria and hematuria.   Neurological: Negative for weakness and numbness.       Objective:   /78   Pulse 69   Temp 98.3 °F (36.8 °C) (Oral)   Ht 5' 1.5" (1.562 m)   Wt 60.3 kg (133 lb)   SpO2 98%   BMI 24.72 kg/m²     Physical Exam   Constitutional: She appears well-developed and well-nourished. She is cooperative.   HENT:   Head: Normocephalic and atraumatic.   Right Ear: Tympanic membrane, external ear and ear canal normal.   Left Ear: Tympanic membrane, external ear and ear canal normal.   Nose: Nose normal.   Mouth/Throat: Uvula is midline and mucous membranes are normal. No oral lesions. No oropharyngeal exudate, posterior oropharyngeal edema or posterior oropharyngeal erythema.   Eyes: EOM and lids are normal. Pupils are equal, round, and reactive to light. Right eye exhibits no discharge. Left eye exhibits no discharge. Right conjunctiva is not injected. Right conjunctiva has no hemorrhage. Left conjunctiva is not injected. Left conjunctiva has no hemorrhage. No scleral icterus. Right eye exhibits no nystagmus. Left eye exhibits no nystagmus.   Neck: Normal range of motion and full passive range of motion without pain. Neck supple. No JVD present. No tracheal tenderness present. Carotid bruit is not present. No tracheal deviation present. No thyroid mass and no thyromegaly present.   Cardiovascular: Normal rate, regular rhythm, S1 normal and S2 normal.    No murmur heard.  Pulses:       Carotid pulses are 2+ on the right side, and 2+ on the " left side.       Radial pulses are 2+ on the right side, and 2+ on the left side.        Posterior tibial pulses are 2+ on the right side, and 2+ on the left side.   Pulmonary/Chest: Effort normal and breath sounds normal. No respiratory distress. She has no wheezes. She has no rhonchi. She has no rales.   Abdominal: Soft. Normal appearance, normal aorta and bowel sounds are normal. She exhibits no distension, no abdominal bruit, no pulsatile midline mass and no mass. There is no hepatosplenomegaly. There is no tenderness. There is no rebound.   Musculoskeletal:        Right knee: She exhibits no swelling. No tenderness found.        Left knee: She exhibits no swelling. No tenderness found.   Lymphadenopathy:        Head (right side): No submental and no submandibular adenopathy present.        Head (left side): No submental and no submandibular adenopathy present.     She has no cervical adenopathy.   Neurological: She is alert. She has normal strength. No cranial nerve deficit or sensory deficit.   Skin: Skin is warm and dry. No rash noted. No cyanosis. Nails show no clubbing.   Psychiatric: She has a normal mood and affect. Her speech is normal and behavior is normal. Thought content normal. Cognition and memory are normal.       Assessment:     1. Cough    2. Cancer of upper lobe of right lung        Plan:   Gloria was seen today for follow-up and cyst.    Diagnoses and all orders for this visit:    Cough  -     X-Ray Chest PA And Lateral; Future    Cancer of upper lobe of right lung  -     X-Ray Chest PA And Lateral; Future    Other orders  -     predniSONE (DELTASONE) 5 MG tablet; Take 8 po q day x 3 days then 4 po q day x 3 days then 2 po q day x 3 days then 1 po q day x 3 days then stop.  -     guaifenesin-codeine 100-10 mg/5 ml (TUSSI-ORGANIDIN NR)  mg/5 mL syrup; Take 10 mLs by mouth 4 (four) times daily as needed for Cough.  -     guaiFENesin (MUCINEX) 600 mg 12 hr tablet; Take 1 tablet (600 mg total)  by mouth 2 (two) times daily.

## 2018-07-26 ENCOUNTER — HOSPITAL ENCOUNTER (OUTPATIENT)
Dept: RADIOLOGY | Facility: HOSPITAL | Age: 78
Discharge: HOME | End: 2018-07-26
Attending: NURSE PRACTITIONER
Payer: MEDICARE

## 2018-07-26 ENCOUNTER — OFFICE VISIT (OUTPATIENT)
Dept: FAMILY MEDICINE | Facility: CLINIC | Age: 78
End: 2018-07-26
Payer: MEDICARE

## 2018-07-26 VITALS
TEMPERATURE: 98 F | WEIGHT: 137 LBS | HEART RATE: 64 BPM | SYSTOLIC BLOOD PRESSURE: 159 MMHG | BODY MASS INDEX: 25.86 KG/M2 | HEIGHT: 61 IN | DIASTOLIC BLOOD PRESSURE: 73 MMHG | OXYGEN SATURATION: 95 %

## 2018-07-26 DIAGNOSIS — R09.89 CHEST CONGESTION: ICD-10-CM

## 2018-07-26 DIAGNOSIS — J06.9 UPPER RESPIRATORY TRACT INFECTION, UNSPECIFIED TYPE: Primary | ICD-10-CM

## 2018-07-26 DIAGNOSIS — R05.9 COUGH: ICD-10-CM

## 2018-07-26 PROCEDURE — 99213 OFFICE O/P EST LOW 20 MIN: CPT | Mod: S$PBB,,, | Performed by: NURSE PRACTITIONER

## 2018-07-26 PROCEDURE — 71046 X-RAY EXAM CHEST 2 VIEWS: CPT | Mod: TC,PO

## 2018-07-26 PROCEDURE — 71046 X-RAY EXAM CHEST 2 VIEWS: CPT | Mod: 26,,, | Performed by: RADIOLOGY

## 2018-07-26 PROCEDURE — 99999 PR PBB SHADOW E&M-EST. PATIENT-LVL IV: CPT | Mod: PBBFAC,,, | Performed by: NURSE PRACTITIONER

## 2018-07-26 PROCEDURE — 99214 OFFICE O/P EST MOD 30 MIN: CPT | Mod: PBBFAC,PO,25 | Performed by: NURSE PRACTITIONER

## 2018-07-26 RX ORDER — HYDROCODONE POLISTIREX AND CHLORPHENIRAMINE POLISTIREX 10; 8 MG/5ML; MG/5ML
5 SUSPENSION, EXTENDED RELEASE ORAL EVERY 12 HOURS PRN
Qty: 115 ML | Refills: 0 | Status: SHIPPED | OUTPATIENT
Start: 2018-07-26 | End: 2018-08-05

## 2018-07-26 RX ORDER — METHYLPREDNISOLONE 4 MG/1
TABLET ORAL
Qty: 1 PACKAGE | Refills: 0 | Status: SHIPPED | OUTPATIENT
Start: 2018-07-26 | End: 2018-08-10 | Stop reason: ALTCHOICE

## 2018-07-26 NOTE — PATIENT INSTRUCTIONS
Inhalers as prescribed  Cough med request sent to PCP to approve  Report to ER immediately if symptoms worsen

## 2018-07-26 NOTE — PROGRESS NOTES
Subjective:       Patient ID: Gloria Hale is a 77 y.o. female.    Chief Complaint: Cough and Chest Congestion    Cough   This is a recurrent problem. The current episode started more than 1 month ago (Current in the past 7 d). The problem has been unchanged. The problem occurs every few minutes. The cough is non-productive. Associated symptoms include nasal congestion. Pertinent negatives include no chest pain, chills, ear congestion, ear pain, fever, headaches, heartburn, hemoptysis, myalgias, postnasal drip, rash, rhinorrhea, sore throat, shortness of breath, sweats, weight loss or wheezing. Nothing aggravates the symptoms. She has tried a beta-agonist inhaler for the symptoms. The treatment provided mild relief. Her past medical history is significant for bronchitis, COPD and pneumonia. There is no history of asthma, bronchiectasis, emphysema or environmental allergies. Cancer resection of right upper lung due to lung cancer     Past Medical History:   Diagnosis Date    Abdominal aortic aneurysm     Bile duct obstruction     per pt/ has seen MD    Cancer of upper lobe of right lung 2/26/2018    She had a resection of her right upper lobe due to a cancer at [facility] and she reports that the nodes were all negative.   She is not needing chemo or radiation.     Cataract     Colon polyp     hyperplastic 2007    COPD (chronic obstructive pulmonary disease)     Diplopia     DJD (degenerative joint disease), lumbar     HTN (hypertension)     Hyperlipidemia     Hypothyroidism     Obstruction of kidney     per pt/ pt does not know which kidney/ pt has appt to see nephrologist on 7/3    Osteopenia     Posterior vitreous detachment     Posterior vitreous detachment of right eye     Thyroiditis     positive thyroperoxidase antibodies    Trochanteric bursitis      Social History     Social History    Marital status:      Spouse name: Ibrahima    Number of children: 2    Years of  education: N/A     Occupational History    Not on file.     Social History Main Topics    Smoking status: Former Smoker     Packs/day: 1.00     Years: 45.00     Types: Cigarettes     Quit date: 2007    Smokeless tobacco: Never Used    Alcohol use No    Drug use: No    Sexual activity: Not on file     Social History Narrative    No narrative on file     Past Surgical History:   Procedure Laterality Date    CARPAL TUNNEL RELEASE      left     SECTION, CLASSIC      x2       Review of Systems   Constitutional: Negative.  Negative for chills, fever and weight loss.   HENT: Negative.  Negative for ear pain, postnasal drip, rhinorrhea and sore throat.    Eyes: Negative.    Respiratory: Positive for cough. Negative for hemoptysis, shortness of breath and wheezing.    Cardiovascular: Negative.  Negative for chest pain.   Gastrointestinal: Negative.  Negative for heartburn.   Endocrine: Negative.    Genitourinary: Negative.    Musculoskeletal: Negative.  Negative for myalgias.   Skin: Negative.  Negative for rash.   Allergic/Immunologic: Negative.  Negative for environmental allergies.   Neurological: Negative.  Negative for headaches.   Psychiatric/Behavioral: Negative.        Objective:      Physical Exam   Constitutional: She is oriented to person, place, and time. She appears well-developed and well-nourished.   HENT:   Head: Normocephalic.   Right Ear: External ear normal.   Left Ear: External ear normal.   Nose: Nose normal.   Mouth/Throat: Oropharynx is clear and moist.   Eyes: Conjunctivae are normal. Pupils are equal, round, and reactive to light.   Neck: Normal range of motion. Neck supple.   Cardiovascular: Normal rate, regular rhythm and normal heart sounds.    Pulmonary/Chest: Effort normal and breath sounds normal.   Abdominal: Soft. Bowel sounds are normal.   Musculoskeletal: Normal range of motion.   Neurological: She is alert and oriented to person, place, and time.   Skin: Skin is warm  and dry. Capillary refill takes 2 to 3 seconds.   Psychiatric: She has a normal mood and affect. Her behavior is normal. Judgment and thought content normal.   Nursing note and vitals reviewed.      Assessment:       1. Upper respiratory tract infection, unspecified type    2. Cough    3. Chest congestion        Plan:           Gloria was seen today for cough and chest congestion.    Diagnoses and all orders for this visit:    Upper respiratory tract infection, unspecified type  Cough  Chest congestion  -     X-Ray Chest PA And Lateral; Future  -     methylPREDNISolone (MEDROL DOSEPACK) 4 mg tablet; use as directed        Tussionex request sent to on call MD to approve        Continue current inhalers        Report to ER immediately if symptoms worsen

## 2018-08-07 ENCOUNTER — TELEPHONE (OUTPATIENT)
Dept: FAMILY MEDICINE | Facility: CLINIC | Age: 78
End: 2018-08-07

## 2018-08-07 NOTE — TELEPHONE ENCOUNTER
Spoke w/pt and offered same day appointment with NP. States she only wants to see Dr Norman. Scheduled for 8/10/18.

## 2018-08-07 NOTE — TELEPHONE ENCOUNTER
----- Message from Jyoti Calderon sent at 8/7/2018 10:40 AM CDT -----  Contact: pt   Call pt regarding getting fitted in tomorrow to see the doctor for congestion.    [phone] (home)

## 2018-08-10 ENCOUNTER — OFFICE VISIT (OUTPATIENT)
Dept: FAMILY MEDICINE | Facility: CLINIC | Age: 78
End: 2018-08-10
Payer: MEDICARE

## 2018-08-10 VITALS
TEMPERATURE: 98 F | HEIGHT: 62 IN | WEIGHT: 139.81 LBS | BODY MASS INDEX: 25.73 KG/M2 | DIASTOLIC BLOOD PRESSURE: 90 MMHG | SYSTOLIC BLOOD PRESSURE: 210 MMHG | HEART RATE: 73 BPM

## 2018-08-10 DIAGNOSIS — I47.19 ECTOPIC ATRIAL TACHYCARDIA: ICD-10-CM

## 2018-08-10 DIAGNOSIS — I10 ESSENTIAL HYPERTENSION: ICD-10-CM

## 2018-08-10 DIAGNOSIS — J44.1 COPD WITH ACUTE EXACERBATION: Primary | ICD-10-CM

## 2018-08-10 PROCEDURE — 99214 OFFICE O/P EST MOD 30 MIN: CPT | Mod: S$PBB,,, | Performed by: FAMILY MEDICINE

## 2018-08-10 PROCEDURE — 99213 OFFICE O/P EST LOW 20 MIN: CPT | Mod: PBBFAC,PO | Performed by: FAMILY MEDICINE

## 2018-08-10 PROCEDURE — 99999 PR PBB SHADOW E&M-EST. PATIENT-LVL III: CPT | Mod: PBBFAC,,, | Performed by: FAMILY MEDICINE

## 2018-08-10 RX ORDER — DILTIAZEM HYDROCHLORIDE 240 MG/1
240 CAPSULE, COATED, EXTENDED RELEASE ORAL 2 TIMES DAILY
Qty: 180 CAPSULE | Refills: 3 | Status: SHIPPED | OUTPATIENT
Start: 2018-08-10 | End: 2019-09-26 | Stop reason: SDUPTHER

## 2018-08-10 RX ORDER — DILTIAZEM HYDROCHLORIDE 240 MG/1
240 CAPSULE, COATED, EXTENDED RELEASE ORAL 2 TIMES DAILY
Qty: 90 CAPSULE | Refills: 3 | Status: SHIPPED | OUTPATIENT
Start: 2018-08-10 | End: 2018-08-10 | Stop reason: SDUPTHER

## 2018-08-10 RX ORDER — ROSUVASTATIN CALCIUM 40 MG/1
40 TABLET, COATED ORAL DAILY
Qty: 30 TABLET | Refills: 3 | Status: SHIPPED | OUTPATIENT
Start: 2018-08-10 | End: 2018-08-14 | Stop reason: SDUPTHER

## 2018-08-10 RX ORDER — TIOTROPIUM BROMIDE 18 UG/1
18 CAPSULE ORAL; RESPIRATORY (INHALATION) DAILY
Qty: 30 CAPSULE | Refills: 11 | Status: SHIPPED | OUTPATIENT
Start: 2018-08-10 | End: 2018-11-02 | Stop reason: ALTCHOICE

## 2018-08-10 RX ORDER — METOPROLOL SUCCINATE 25 MG/1
25 TABLET, EXTENDED RELEASE ORAL DAILY
Qty: 90 TABLET | Refills: 3 | Status: SHIPPED | OUTPATIENT
Start: 2018-08-10 | End: 2018-10-09 | Stop reason: DRUGHIGH

## 2018-08-10 NOTE — PROGRESS NOTES
Subjective:      Patient ID: Gloria Hale is a 77 y.o. female.    Chief Complaint: Congestion    HPI she complains of congestion of the chest. She states that she occasionally coughs up something in her chest.  She has had no major phlegm noted. She has not had any wheezing. She has had this for the last month and it got better when I used a high dose steroid and it went away for 2 weeks but then it came back and she saw Nick recently. She was given a medrol dose pack and it did not help her.  She is not wheezing at night.  She has not had fever noted.  She hs had a partial lobectomy of her lung due to lung cancer.     Details     Reading Physician Reading Date Result Priority   Edmundo Valderrama MD 7/26/2018    Narrative     EXAMINATION:  XR CHEST PA AND LATERAL    CLINICAL HISTORY:  Cough    TECHNIQUE:  PA and lateral views of the chest were performed.    COMPARISON:  June 6, 2018 March 14 & 26, 2018    FINDINGS:  Cardiac silhouette and mediastinal contours are normal.  Lungs demonstrate no conclusion airspace opacity..  Osseous structures are intact.      Impression       No acute cardiopulmonary process.      Electronically signed by: Edmundo Valderrama MD  Date: 07/26/2018  Time: 11:19     Of concern is that her blood pressure is high.  She has been off of the prednisone for about 1 week now.     The patient is tolerating the medication well and is in excellent compliance.  The patient is experiencing no side effects.  Counseling was offered regarding low salt diets.  The patient has a reduced salt intake.  The patient denies chest pain, palpitations, left or murmur neck pain, nausea, vomiting, diaphoresis, paroxysmal nocturnal dyspnea, and orthopnea.    Hypertension Medications             diltiaZEM (CARDIZEM CD) 240 MG 24 hr capsule Take 1 capsule (240 mg total) by mouth once daily.    metoprolol succinate (TOPROL-XL) 25 MG 24 hr tablet Take 1 tablet (25 mg total) by mouth once daily.        She has been  kajal by Dr. Brayn. Notes reviewed today.    CT Chest W Contrast4/10/2018  [facility]  Result Impression    1.  Prior right upper lobectomy and resection of previously noted right upper lobe lesion.   2.   Small nodular opacity in the posterior right lower lobe. This appears to have been present on prior nuclear medicine PET/CT and is unchanged. Attention on follow-up studies.  3. Atherosclerosis, including coronary vessel involvement.        Electronically signed by Ag Shelton MD on 4/10/2018 12:20 PM   Result Narrative   REASON FOR EXAM: [C34.91]-Malignant neoplasm of unspecified part of right bronchus or lung / [J43.2]-Centrilobular emphysema      TECHNICAL FACTORS: Multiple contiguous axial CT images were obtained of the chest after administration of intravenous contrast. Automated exposure control was utilized for radiation dose reduction.     DOSE: 70 mL Isovue-370    COMPARISON: Nuclear medicine PET/CT 10/19/2017     FINDINGS: The patient is status post right upper lobectomy. Suture material is noted within the right hilar region.  There is a 5 mm nodular opacity identified posteriorly on the right (series 2 image 25). This may have been present on prior nuclear   medicine PET/CT in the right lower lobe. Just superior to that is a stable region of ill-defined groundglass density.  The left lung appears grossly clear.  There is no pleural effusion or pneumothorax.  No definitive lymphadenopathy.  Heavy   atherosclerosis of the thoracic aorta. There is coronary vessel atherosclerosis also noted. The heart does not appear enlarged and there is no pericardial fluid. There is a 3.3 cm right renal cyst. The upper abdominal structures appear otherwise   unremarkable. There are degenerative changes of the vertebral column. There are some dystrophic calcifications adjacent to the right seventh rib consistent with prior thoracotomy.     She has only been using the albuterol.  Review of Systems  "  Constitutional: Negative for fatigue, fever and unexpected weight change.   HENT: Negative for congestion, ear pain, postnasal drip and sore throat.    Eyes: Negative for visual disturbance.   Respiratory: Positive for chest tightness. Negative for cough, shortness of breath and wheezing.    Cardiovascular: Negative for chest pain, palpitations and leg swelling.   Gastrointestinal: Negative for abdominal pain, blood in stool, constipation, diarrhea, nausea and vomiting.   Genitourinary: Negative for dysuria and hematuria.   Neurological: Negative for weakness and numbness.       Objective:   BP (!) 210/90 Comment: manual  Pulse 73   Temp 98 °F (36.7 °C) (Oral)   Ht 5' 1.5" (1.562 m)   Wt 63.4 kg (139 lb 12.8 oz)   BMI 25.99 kg/m²     Physical Exam   Constitutional: She appears well-developed and well-nourished. She is cooperative.   HENT:   Head: Normocephalic and atraumatic.   Right Ear: Tympanic membrane, external ear and ear canal normal.   Left Ear: Tympanic membrane, external ear and ear canal normal.   Nose: Nose normal.   Mouth/Throat: Uvula is midline and mucous membranes are normal. No oral lesions. No oropharyngeal exudate, posterior oropharyngeal edema or posterior oropharyngeal erythema.   Eyes: EOM and lids are normal. Pupils are equal, round, and reactive to light. Right eye exhibits no discharge. Left eye exhibits no discharge. Right conjunctiva is not injected. Right conjunctiva has no hemorrhage. Left conjunctiva is not injected. Left conjunctiva has no hemorrhage. No scleral icterus. Right eye exhibits no nystagmus. Left eye exhibits no nystagmus.   Neck: Normal range of motion and full passive range of motion without pain. Neck supple. No JVD present. No tracheal tenderness present. Carotid bruit is not present. No tracheal deviation present. No thyroid mass and no thyromegaly present.   Cardiovascular: Normal rate, regular rhythm, S1 normal and S2 normal.   No murmur heard.  Pulses:       " Carotid pulses are 2+ on the right side, and 2+ on the left side.       Radial pulses are 2+ on the right side, and 2+ on the left side.        Posterior tibial pulses are 2+ on the right side, and 2+ on the left side.   Pulmonary/Chest: Effort normal. No respiratory distress. She has wheezes (This is scant.). She has no rhonchi. She has no rales.   Abdominal: Soft. Normal appearance, normal aorta and bowel sounds are normal. She exhibits no distension, no abdominal bruit, no pulsatile midline mass and no mass. There is no hepatosplenomegaly. There is no tenderness. There is no rebound.   Musculoskeletal:        Right knee: She exhibits no swelling. No tenderness found.        Left knee: She exhibits no swelling. No tenderness found.   Lymphadenopathy:        Head (right side): No submental and no submandibular adenopathy present.        Head (left side): No submental and no submandibular adenopathy present.     She has no cervical adenopathy.   Neurological: She is alert. She has normal strength. No cranial nerve deficit or sensory deficit.   Skin: Skin is warm and dry. No rash noted. No cyanosis. Nails show no clubbing.   Psychiatric: She has a normal mood and affect. Her speech is normal and behavior is normal. Thought content normal. Cognition and memory are normal.       Assessment:     1. COPD with acute exacerbation    2. Essential hypertension    3. Ectopic atrial tachycardia        Plan:   Gloria was seen today for nasal congestion.    Diagnoses and all orders for this visit:    COPD with acute exacerbation    Essential hypertension  -     Discontinue: diltiaZEM (CARDIZEM CD) 240 MG 24 hr capsule; Take 1 capsule (240 mg total) by mouth 2 (two) times daily.  -     diltiaZEM (CARDIZEM CD) 240 MG 24 hr capsule; Take 1 capsule (240 mg total) by mouth 2 (two) times daily.  -     metoprolol succinate (TOPROL-XL) 25 MG 24 hr tablet; Take 1 tablet (25 mg total) by mouth once daily.    Ectopic atrial tachycardia  -      Discontinue: diltiaZEM (CARDIZEM CD) 240 MG 24 hr capsule; Take 1 capsule (240 mg total) by mouth 2 (two) times daily.  -     diltiaZEM (CARDIZEM CD) 240 MG 24 hr capsule; Take 1 capsule (240 mg total) by mouth 2 (two) times daily.  -     metoprolol succinate (TOPROL-XL) 25 MG 24 hr tablet; Take 1 tablet (25 mg total) by mouth once daily.    Other orders  -     tiotropium (SPIRIVA) 18 mcg inhalation capsule; Inhale 1 capsule (18 mcg total) into the lungs once daily.  -     rosuvastatin (CRESTOR) 40 MG Tab; Take 1 tablet (40 mg total) by mouth once daily. Give Generic Brand      It is critical at this time for us to control her blood pressure before add steroids to her regimen.  Therefore I will double her diltiazem to a 240 mg tablet twice a day.  She will return to me tomorrow to recheck her blood pressure at which time I will decide about using steroids based on her improvement with the blood pressure.

## 2018-08-11 ENCOUNTER — OFFICE VISIT (OUTPATIENT)
Dept: FAMILY MEDICINE | Facility: CLINIC | Age: 78
End: 2018-08-11
Payer: MEDICARE

## 2018-08-11 VITALS
WEIGHT: 139 LBS | BODY MASS INDEX: 26.24 KG/M2 | TEMPERATURE: 99 F | HEIGHT: 61 IN | DIASTOLIC BLOOD PRESSURE: 60 MMHG | HEART RATE: 61 BPM | SYSTOLIC BLOOD PRESSURE: 144 MMHG

## 2018-08-11 DIAGNOSIS — J44.1 COPD WITH ACUTE EXACERBATION: Primary | ICD-10-CM

## 2018-08-11 DIAGNOSIS — I10 ESSENTIAL HYPERTENSION: ICD-10-CM

## 2018-08-11 PROCEDURE — 99999 PR PBB SHADOW E&M-EST. PATIENT-LVL III: CPT | Mod: PBBFAC,,, | Performed by: FAMILY MEDICINE

## 2018-08-11 PROCEDURE — 99213 OFFICE O/P EST LOW 20 MIN: CPT | Mod: PBBFAC,PO | Performed by: FAMILY MEDICINE

## 2018-08-11 PROCEDURE — 99214 OFFICE O/P EST MOD 30 MIN: CPT | Mod: S$PBB,,, | Performed by: FAMILY MEDICINE

## 2018-08-11 RX ORDER — PREDNISONE 5 MG/1
TABLET ORAL
Qty: 45 TABLET | Refills: 0 | Status: SHIPPED | OUTPATIENT
Start: 2018-08-11 | End: 2018-09-21

## 2018-08-14 RX ORDER — ROSUVASTATIN CALCIUM 40 MG/1
40 TABLET, COATED ORAL DAILY
Qty: 90 TABLET | Refills: 0 | Status: SHIPPED | OUTPATIENT
Start: 2018-08-14 | End: 2018-12-03 | Stop reason: SDUPTHER

## 2018-08-17 ENCOUNTER — PATIENT OUTREACH (OUTPATIENT)
Dept: ADMINISTRATIVE | Facility: HOSPITAL | Age: 78
End: 2018-08-17

## 2018-08-17 NOTE — PROGRESS NOTES
Subjective:      Patient ID: Gloria Hale is a 77 y.o. female.    Chief Complaint: Blood Pressure Check    Problem List Items Addressed This Visit     COPD with acute exacerbation - Primary    Overview     The patient presents with COPD.  The patient denies chest pain, palpitations, shortness of breath, difficulty breathing, and wheezing.  The patient feels that the pattern of symptoms is stable.  Current treatment has included albuterol inhaler.             HTN (hypertension)    Overview     The patient presents with essential hypertension.  The patient is tolerating the medication well and is in excellent compliance.  The patient is experiencing no side effects.  Counseling was offered regarding low salt diets.  The patient has a reduced salt intake.  The patient denies chest pain, palpitations, shortness of breath, dyspnea on exertion, left or murmur neck pain, nausea, vomiting, diaphoresis, paroxysmal nocturnal dyspnea, and orthopnea.   Hypertension Medications             diltiaZEM (CARDIZEM CD) 240 MG 24 hr capsule Take 240 mg by mouth once daily.     lisinopril (PRINIVIL,ZESTRIL) 40 MG tablet TAKE 1 TABLET (40 MG TOTAL) BY MOUTH ONCE DAILY.    metoprolol succinate (TOPROL-XL) 25 MG 24 hr tablet TAKE 1 TABLET (25 MG TOTAL) BY MOUTH ONCE DAILY.                       Past Medical History:  Past Medical History:   Diagnosis Date    Abdominal aortic aneurysm     Bile duct obstruction     per pt/ has seen MD    Cancer of upper lobe of right lung 2/26/2018    She had a resection of her right upper lobe due to a cancer at [facility] and she reports that the nodes were all negative.   She is not needing chemo or radiation.     Cataract     Colon polyp     hyperplastic 2007    COPD (chronic obstructive pulmonary disease)     Diplopia     DJD (degenerative joint disease), lumbar     HTN (hypertension)     Hyperlipidemia     Hypothyroidism     Obstruction of kidney     per pt/ pt does not know  which kidney/ pt has appt to see nephrologist on 7/3    Osteopenia     Posterior vitreous detachment     Posterior vitreous detachment of right eye     Thyroiditis     positive thyroperoxidase antibodies    Trochanteric bursitis      Past Surgical History:   Procedure Laterality Date    CARPAL TUNNEL RELEASE      left     SECTION, CLASSIC      x2     Review of patient's allergies indicates:  No Known Allergies  Current Outpatient Medications on File Prior to Visit   Medication Sig Dispense Refill    albuterol (ACCUNEB) 0.63 mg/3 mL Nebu USE 1 VIAL VIA NEBULIZER EVERY SIX HOURS AS NEEDED FOR WHEEZING OR SHORTNESS OF BREATH 90 mL 0    albuterol 90 mcg/actuation inhaler Inhale 2 puffs into the lungs every 6 (six) hours as needed for Wheezing. 8 g 11    calcium carbonate (OS-PAPITO) 500 mg calcium (1,250 mg) tablet Take 3 tablets by mouth once daily.      diltiaZEM (CARDIZEM CD) 240 MG 24 hr capsule Take 1 capsule (240 mg total) by mouth 2 (two) times daily. 180 capsule 03    fish oil-omega-3 fatty acids 300-1,000 mg capsule Take 2 g by mouth once daily.      fluticasone (FLONASE) 50 mcg/actuation nasal spray 2 sprays by Each Nare route once daily. 16 g 2    guaiFENesin (MUCINEX) 600 mg 12 hr tablet Take 1 tablet (600 mg total) by mouth 2 (two) times daily. 20 tablet 0    levothyroxine (SYNTHROID) 75 MCG tablet Take 1 tablet (75 mcg total) by mouth once daily. 90 tablet 3    metoprolol succinate (TOPROL-XL) 25 MG 24 hr tablet Take 1 tablet (25 mg total) by mouth once daily. 90 tablet 3    tiotropium (SPIRIVA) 18 mcg inhalation capsule Inhale 1 capsule (18 mcg total) into the lungs once daily. 30 capsule 11     No current facility-administered medications on file prior to visit.      Social History     Socioeconomic History    Marital status:      Spouse name: Ibrahima    Number of children: 2    Years of education: Not on file    Highest education level: Not on file   Social Needs     "Financial resource strain: Not on file    Food insecurity - worry: Not on file    Food insecurity - inability: Not on file    Transportation needs - medical: Not on file    Transportation needs - non-medical: Not on file   Occupational History    Not on file   Tobacco Use    Smoking status: Former Smoker     Packs/day: 1.00     Years: 45.00     Pack years: 45.00     Types: Cigarettes     Last attempt to quit: 2007     Years since quittin.6    Smokeless tobacco: Never Used   Substance and Sexual Activity    Alcohol use: No    Drug use: No    Sexual activity: Not on file   Other Topics Concern    Not on file   Social History Narrative    Not on file     Family History   Problem Relation Age of Onset    Coronary artery disease Mother     Diabetes Mother     Amblyopia Neg Hx     Blindness Neg Hx     Cancer Neg Hx     Cataracts Neg Hx     Glaucoma Neg Hx     Hypertension Neg Hx     Macular degeneration Neg Hx     Retinal detachment Neg Hx     Strabismus Neg Hx     Stroke Neg Hx     Thyroid disease Neg Hx        Review of Systems   Constitutional: Negative for fatigue, fever and unexpected weight change.   HENT: Negative for congestion, ear pain, postnasal drip and sore throat.    Eyes: Negative for visual disturbance.   Respiratory: Positive for cough and shortness of breath. Negative for chest tightness and wheezing.    Cardiovascular: Negative for chest pain, palpitations and leg swelling.   Gastrointestinal: Negative for abdominal pain, blood in stool, constipation, diarrhea, nausea and vomiting.   Genitourinary: Negative for dysuria and hematuria.   Neurological: Negative for weakness and numbness.       Objective:     BP (!) 144/60   Pulse 61   Temp 98.6 °F (37 °C) (Oral)   Ht 5' 1" (1.549 m)   Wt 63 kg (139 lb)   BMI 26.26 kg/m²     Physical Exam   Constitutional: She appears well-developed and well-nourished. She is cooperative.   HENT:   Head: Normocephalic and atraumatic. "   Right Ear: Tympanic membrane, external ear and ear canal normal.   Left Ear: Tympanic membrane, external ear and ear canal normal.   Nose: Nose normal.   Mouth/Throat: Uvula is midline and mucous membranes are normal. No oral lesions. No oropharyngeal exudate, posterior oropharyngeal edema or posterior oropharyngeal erythema.   Eyes: EOM and lids are normal. Pupils are equal, round, and reactive to light. Right eye exhibits no discharge. Left eye exhibits no discharge. Right conjunctiva is not injected. Right conjunctiva has no hemorrhage. Left conjunctiva is not injected. Left conjunctiva has no hemorrhage. No scleral icterus. Right eye exhibits no nystagmus. Left eye exhibits no nystagmus.   Neck: Normal range of motion and full passive range of motion without pain. Neck supple. No JVD present. No tracheal tenderness present. Carotid bruit is not present. No tracheal deviation present. No thyroid mass and no thyromegaly present.   Cardiovascular: Normal rate, regular rhythm, S1 normal and S2 normal.   No murmur heard.  Pulses:       Carotid pulses are 2+ on the right side, and 2+ on the left side.       Radial pulses are 2+ on the right side, and 2+ on the left side.        Posterior tibial pulses are 2+ on the right side, and 2+ on the left side.   Pulmonary/Chest: Effort normal. No respiratory distress. She has wheezes. She has no rhonchi. She has no rales.   Abdominal: Soft. Normal appearance, normal aorta and bowel sounds are normal. She exhibits no distension, no abdominal bruit, no pulsatile midline mass and no mass. There is no hepatosplenomegaly. There is no tenderness. There is no rebound.   Musculoskeletal:        Right knee: She exhibits no swelling. No tenderness found.        Left knee: She exhibits no swelling. No tenderness found.   Lymphadenopathy:        Head (right side): No submental and no submandibular adenopathy present.        Head (left side): No submental and no submandibular adenopathy  present.     She has no cervical adenopathy.   Neurological: She is alert. She has normal strength. No cranial nerve deficit or sensory deficit.   Skin: Skin is warm and dry. No rash noted. No cyanosis. Nails show no clubbing.   Psychiatric: She has a normal mood and affect. Her speech is normal and behavior is normal. Thought content normal. Cognition and memory are normal.     Assessment:     1. COPD with acute exacerbation    2. Essential hypertension        Plan:     Problem List Items Addressed This Visit     COPD with acute exacerbation - Primary    HTN (hypertension)        Gloria was seen today for blood pressure check.    Diagnoses and all orders for this visit:    COPD with acute exacerbation    Essential hypertension    Other orders  -     predniSONE (DELTASONE) 5 MG tablet; Take 8 po q day x 3 days then 4 po q day x 3 days then 2 po q day x 3 days then 1 po q day x 3 days then stop.        No Follow-up on file.

## 2018-08-30 ENCOUNTER — TELEPHONE (OUTPATIENT)
Dept: RADIOLOGY | Facility: HOSPITAL | Age: 78
End: 2018-08-30

## 2018-08-31 ENCOUNTER — PATIENT OUTREACH (OUTPATIENT)
Dept: ADMINISTRATIVE | Facility: HOSPITAL | Age: 78
End: 2018-08-31

## 2018-08-31 ENCOUNTER — TELEPHONE (OUTPATIENT)
Dept: RADIOLOGY | Facility: HOSPITAL | Age: 78
End: 2018-08-31

## 2018-09-04 ENCOUNTER — OFFICE VISIT (OUTPATIENT)
Dept: FAMILY MEDICINE | Facility: CLINIC | Age: 78
End: 2018-09-04
Payer: MEDICARE

## 2018-09-04 ENCOUNTER — HOSPITAL ENCOUNTER (OUTPATIENT)
Dept: RADIOLOGY | Facility: HOSPITAL | Age: 78
Discharge: HOME | End: 2018-09-04
Attending: NURSE PRACTITIONER
Payer: MEDICARE

## 2018-09-04 ENCOUNTER — HOSPITAL ENCOUNTER (OUTPATIENT)
Dept: RADIOLOGY | Facility: HOSPITAL | Age: 78
Discharge: HOME | End: 2018-09-04
Attending: FAMILY MEDICINE
Payer: MEDICARE

## 2018-09-04 VITALS
WEIGHT: 140.19 LBS | BODY MASS INDEX: 25.8 KG/M2 | RESPIRATION RATE: 14 BRPM | DIASTOLIC BLOOD PRESSURE: 66 MMHG | OXYGEN SATURATION: 99 % | HEIGHT: 62 IN | HEART RATE: 61 BPM | TEMPERATURE: 98 F | SYSTOLIC BLOOD PRESSURE: 136 MMHG

## 2018-09-04 DIAGNOSIS — J44.9 CHRONIC OBSTRUCTIVE PULMONARY DISEASE, UNSPECIFIED COPD TYPE: ICD-10-CM

## 2018-09-04 DIAGNOSIS — I47.19 ECTOPIC ATRIAL TACHYCARDIA: ICD-10-CM

## 2018-09-04 DIAGNOSIS — I10 ESSENTIAL HYPERTENSION: Primary | ICD-10-CM

## 2018-09-04 DIAGNOSIS — J44.1 COPD WITH ACUTE EXACERBATION: ICD-10-CM

## 2018-09-04 DIAGNOSIS — I71.9 AORTIC ANEURYSM WITHOUT RUPTURE, UNSPECIFIED PORTION OF AORTA: ICD-10-CM

## 2018-09-04 PROCEDURE — 76775 US EXAM ABDO BACK WALL LIM: CPT | Mod: TC,PO

## 2018-09-04 PROCEDURE — 99999 PR PBB SHADOW E&M-EST. PATIENT-LVL IV: CPT | Mod: PBBFAC,,, | Performed by: NURSE PRACTITIONER

## 2018-09-04 PROCEDURE — 76775 US EXAM ABDO BACK WALL LIM: CPT | Mod: 26,,, | Performed by: RADIOLOGY

## 2018-09-04 PROCEDURE — 71046 X-RAY EXAM CHEST 2 VIEWS: CPT | Mod: TC,PO

## 2018-09-04 PROCEDURE — 71046 X-RAY EXAM CHEST 2 VIEWS: CPT | Mod: 26,,, | Performed by: RADIOLOGY

## 2018-09-04 PROCEDURE — 99214 OFFICE O/P EST MOD 30 MIN: CPT | Mod: PBBFAC,25,PO | Performed by: NURSE PRACTITIONER

## 2018-09-04 PROCEDURE — 99214 OFFICE O/P EST MOD 30 MIN: CPT | Mod: S$PBB,,, | Performed by: NURSE PRACTITIONER

## 2018-09-04 NOTE — PROGRESS NOTES
"Subjective:      Patient ID: Gloria Hale is a 78 y.o. female.    Chief Complaint: Follow-up    HPI   The patient voices a diagnosis of hypertension, atopic atrial tachycardia for which she currently takes diltiazem, Toprol.  She is tolerating the medication well and is in good compliance.  The patient is experiencing no side effects.  She denies a reduced salt intake.  She denies a regular exercise regimen.  She denies chest pain, palpitations, headache, blurred vision, leg swelling, neck pain, nausea, vomiting, diaphoresis, paroxysmal nocturnal dyspnea, or orthopnea.     She has COPD and a history of lung cancer.  She reports a chronic cough and congestion, as well as shortness of breath exacerbated with activity.  She denies any changes from her norm with this.  She takes Spiriva, albuterol, Mucinex, prednisone.  She has no other complaints today.    Review of Systems   Constitutional: Negative for chills, fatigue and fever.   Respiratory: Positive for cough, shortness of breath and wheezing.    Cardiovascular: Negative for chest pain, palpitations and leg swelling.   Skin: Negative for rash and wound.   Neurological: Negative for weakness and numbness.   Psychiatric/Behavioral: The patient is not nervous/anxious.        Objective:     /66   Pulse 61   Temp 98.3 °F (36.8 °C) (Oral)   Resp 14   Ht 5' 1.5" (1.562 m)   Wt 63.6 kg (140 lb 3.2 oz)   SpO2 99%   BMI 26.06 kg/m²     Physical Exam   Constitutional: She is oriented to person, place, and time. She appears well-developed and well-nourished.   HENT:   Head: Normocephalic.   Eyes: Pupils are equal, round, and reactive to light.   Neck: Normal range of motion. Neck supple.   Cardiovascular: Normal rate, regular rhythm and normal heart sounds.   Pulmonary/Chest: Effort normal. No respiratory distress. She has decreased breath sounds in the left upper field, the left middle field and the left lower field. She has wheezes (faint wheeze noted to left " upper lobe) in the left upper field. She has no rhonchi. She has no rales.   Neurological: She is alert and oriented to person, place, and time.   Skin: Skin is warm and dry. No rash noted.   Psychiatric: She has a normal mood and affect. Her behavior is normal. Judgment and thought content normal.   Vitals reviewed.  Details     Reading Physician Reading Date Result Priority   Benjamin Powell MD 9/4/2018       Narrative     EXAMINATION:  XR CHEST PA AND LATERAL    CLINICAL HISTORY:  Chronic obstructive pulmonary disease with (acute) exacerbation    TECHNIQUE:  PA and lateral views of the chest were performed.    COMPARISON:  07/26/2018    FINDINGS:  The cardiac and mediastinal silhouettes appear within normal limits.   Mild scarring in the right lung base is unchanged.  No parenchymal consolidations or pleural effusions demonstrated.  No acute osseous findings demonstrated.      Impression       No acute findings.      Electronically signed by: Benjamin Powell MD  Date: 09/04/2018  Time: 10:52     Assessment:     1. Essential hypertension    2. Ectopic atrial tachycardia    3. Chronic obstructive pulmonary disease, unspecified COPD type        Plan:     Problem List Items Addressed This Visit        Cardiac/Vascular    HTN (hypertension) - Primary    Ectopic atrial tachycardia      Other Visit Diagnoses     Chronic obstructive pulmonary disease, unspecified COPD type        Relevant Orders    X-Ray Chest PA And Lateral (Completed)      Continue respiratory medications as prescribed.  Routine f/u with pulmonology  Discussed heart healthy/DASH diet and exercise to reduce blood pressure  Reviewed educational handout on recommendations for heart healthy/DASH    Follow-up in about 3 months (around 12/4/2018), or if symptoms worsen or fail to improve.        Parts of this note was dictated using voice recognition software. Please excuse any grammatical or typographical errors.

## 2018-09-04 NOTE — PATIENT INSTRUCTIONS
Eating Heart-Healthy Foods  Eating has a big impact on your heart health. In fact, eating healthier can improve several of your heart risks at once. For instance, it helps you manage weight, cholesterol, and blood pressure. Here are ideas to help you make heart-healthy changes without giving up all the foods and flavors you love.  Getting started  · Talk with your health care provider about eating plans, such as the DASH or Mediterranean diet. You may also be referred to a dietitian.  · Change a few things at a time. Give yourself time to get used to a few eating changes before adding more.  · Work to create a tasty, healthy eating plan that you can stick to for the rest of your life.    Goals for healthy eating  Below are some tips to improve your eating habits:  · Limit saturated fats and trans fats. Saturated fats raise your levels of cholesterol, so keep these fats to a minimum. They are found in foods such as fatty meats, whole milk, cheese, and palm and coconut oils. Avoid trans fats because they lower good cholesterol as well as raise bad cholesterol. Trans fats are most often found in processed foods.  · Reduce sodium (salt) intake. Eating too much salt may increase your blood pressure. Limit your sodium intake to 2,300 milligrams (mg) per day, or less if your health care provider recommends it. Dining out less often and eating fewer processed foods are two great ways to decrease the amount of salt you consume.  · Managing calories. A calorie is a unit of energy. Your body burns calories for fuel, but if you eat more calories than your body burns, the extras are stored as fat. Your health care provider can help you create a diet plan to manage your calories. This will likely include eating healthier foods as well as exercising regularly. To help you track your progress, keep a diary to record what you eat and how often you exercise.  Choose the right foods  Aim to make these foods staples of your diet. If  you have diabetes, you may have different recommendations than what is listed here:  · Fruits and vegetable provide plenty of nutrients without a lot of calories. At meals, fill half your plate with these foods. Split the other half of your plate between whole grains and lean protein.  · Whole grains are high in fiber and rich in vitamins and nutrients. Good choices include whole-wheat bread, pasta, and brown rice.  · Lean proteins give you nutrition with less fat. Good choices include fish, skinless chicken, and beans.  · Low-fat or nonfat dairy provides nutrients without a lot of fat. Try low-fat or nonfat milk, cheese, or yogurt.  · Healthy fats can be good for you in small amounts. These are unsaturated fats, such as olive oil, nuts, and fish. Try to have at least 2 servings per week of fatty fish such as salmon, sardines, mackerel, rainbow trout, and albacore tuna. These contain omega-3 fatty acids, which are good for your heart. Flaxseed is another source of a heart-healthy fat.  More on heart healthy eating    Read food labels  Healthy eating starts at the grocery store. Be sure to pay attention to food labels on packaged foods. Look for products that are high in fiber and protein, and low in saturated fat, cholesterol, and sodium. Avoid products that contain trans fat. And pay close attention to serving size. For instance, if you plan to eat two servings, double all the numbers on the label.  Prepare food right  A key part of healthy cooking is cutting down on added fat and salt. Look on the internet for lower-fat, lower-sodium recipes. Also, try these tips:  · Remove fat from meat and skin from poultry before cooking.  · Skim fat from the surface of soups and sauces.  · Broil, boil, bake, steam, grill, and microwave food without added fats.  · Choose ingredients that spice up your food without adding calories, fat, or sodium. Try these items: horseradish, hot sauce, lemon, mustard, nonfat salad dressings,  and vinegar. For salt-free herbs and spices, try basil, cilantro, cinnamon, pepper, and rosemary.  Date Last Reviewed: 6/25/2015 © 2000-2017 [employer]. [address] [place] [address], [place] [address]. All rights reserved. This information is not intended as a substitute for professional medical care. Always follow your healthcare professional's instructions.        Eating Heart-Healthy Food: Using the DASH Plan    Eating for your heart doesnt have to be hard or boring. You just need to know how to make healthier choices. The DASH eating plan has been developed to help you do just that. DASH stands for Dietary Approaches to Stop Hypertension. It is a plan that has been proven to be healthier for your heart and to lower your risk for high blood pressure. It can also help lower your risk for cancer, heart disease, osteoporosis, and diabetes.  Choosing from each food group  Choose foods from each of the food groups below each day. Try to get the recommended number of servings for each food group. The serving numbers are based on a diet of 2,000 calories a day. Talk to your doctor if youre unsure about your calorie needs. Along with getting the correct servings, the DASH plan also recommends a sodium intake less than 2,300 mg per day.        Grains  Servings: 6 to 8 a day  A serving is:  · 1 slice bread  · 1 ounce dry cereal  · Half a cup cooked rice, pasta or cereal  Best choices: Whole grains and any grains high in fiber. Vegetables  Servings: 4 to 5 a day  A serving is:  · 1 cup raw leafy vegetable  · Half a cup cut-up raw or cooked vegetable  · Half a cup vegetable juice  Best choices: Fresh or frozen vegetables prepared without added salt or fat.   Fruits  Servings: 4 to 5 a day  A serving is:  · 1 medium fruit  · One-quarter cup dried fruit  · Half a cup fresh, frozen, or canned fruit  · Half a cup of 100% fruit juices  Best choices: A variety of fresh fruits of different colors. Whole fruits are  a better choice than fruit juices. Low-fat or fat-free dairy  Servings: 2 to 3 a day  A serving is:  · 1 cup milk  · 1 cup yogurt  · One and a half ounces cheese  Best choices: Skim or 1% milk, low-fat or fat-free yogurt or buttermilk, and low-fat cheeses.         Lean meats, poultry, fish  Servings: 6 or fewer a day  A serving is:  · 1 ounce cooked meats, poultry, or fish  · 1 egg  Best choices: Lean poultry and fish. Trim away visible fat. Broil, grill, roast, or boil instead of frying. Remove skin from poultry before eating. Limit how much red meat you eat.  Nuts, seeds, beans  Servings: 4 to 5 a week  A serving is:  · One-third cup nuts (one and a half ounces)  · 2 tablespoons nut butter or seeds  · Half a cup cooked dry beans or legumes  Best choices: Dry roasted nuts with no salt added, lentils, kidney beans, garbanzo beans, and whole whitney beans.   Fats and oils  Servings: 2 to 3 a day  A serving is:  · 1 teaspoon vegetable oil  · 1 teaspoon soft margarine  · 1 tablespoon mayonnaise  · 2 tablespoons salad dressing  Best choices: Nut and vegetable oils (nontropical vegetable oils), such as olive and canola oil. Sweets  Servings: 5 a week or fewer  A serving is:  · 1 tablespoon sugar, maple syrup, or honey  · 1 tablespoon jam or jelly  · 1 half-ounce jelly beans (about 15)  · 1 cup lemonade  Best choices: Dried fruit can be a satisfying sweet. Choose low-fat sweets. And watch your serving sizes!      For more on the DASH eating plan, visit:  www.nhlbi.nih.gov/health/health-topics/topics/dash   Date Last Reviewed: 6/1/2016  © 7431-1508 [employer]. [address] [place] [address], [place] [address]. All rights reserved. This information is not intended as a substitute for professional medical care. Always follow your healthcare professional's instructions.

## 2018-09-19 ENCOUNTER — TELEPHONE (OUTPATIENT)
Dept: FAMILY MEDICINE | Facility: CLINIC | Age: 78
End: 2018-09-19

## 2018-09-19 NOTE — TELEPHONE ENCOUNTER
----- Message from Jyoti Calderon sent at 9/19/2018  8:04 AM CDT -----  Contact: pt   Call pt regarding scan results.   [phone] ([phone])

## 2018-09-21 ENCOUNTER — OFFICE VISIT (OUTPATIENT)
Dept: FAMILY MEDICINE | Facility: CLINIC | Age: 78
End: 2018-09-21
Payer: MEDICARE

## 2018-09-21 VITALS
TEMPERATURE: 98 F | HEART RATE: 77 BPM | DIASTOLIC BLOOD PRESSURE: 65 MMHG | WEIGHT: 142.19 LBS | BODY MASS INDEX: 26.17 KG/M2 | HEIGHT: 62 IN | OXYGEN SATURATION: 96 % | SYSTOLIC BLOOD PRESSURE: 140 MMHG

## 2018-09-21 DIAGNOSIS — J44.1 COPD WITH ACUTE EXACERBATION: Primary | ICD-10-CM

## 2018-09-21 DIAGNOSIS — I10 ESSENTIAL HYPERTENSION: ICD-10-CM

## 2018-09-21 PROCEDURE — 99213 OFFICE O/P EST LOW 20 MIN: CPT | Mod: S$PBB,,, | Performed by: NURSE PRACTITIONER

## 2018-09-21 PROCEDURE — 99999 PR PBB SHADOW E&M-EST. PATIENT-LVL III: CPT | Mod: PBBFAC,,, | Performed by: NURSE PRACTITIONER

## 2018-09-21 PROCEDURE — 99213 OFFICE O/P EST LOW 20 MIN: CPT | Mod: PBBFAC,PO | Performed by: NURSE PRACTITIONER

## 2018-09-21 RX ORDER — PREDNISONE 5 MG/1
5 TABLET ORAL DAILY
Qty: 30 TABLET | Refills: 0 | Status: SHIPPED | OUTPATIENT
Start: 2018-09-21 | End: 2018-10-21

## 2018-09-21 RX ORDER — BUDESONIDE AND FORMOTEROL FUMARATE DIHYDRATE 160; 4.5 UG/1; UG/1
2 AEROSOL RESPIRATORY (INHALATION) EVERY 12 HOURS
Qty: 1 INHALER | Refills: 0 | Status: SHIPPED | OUTPATIENT
Start: 2018-09-21 | End: 2019-07-22

## 2018-09-21 RX ORDER — PREDNISONE 10 MG/1
10 TABLET ORAL DAILY
Qty: 30 TABLET | Refills: 0 | Status: SHIPPED | OUTPATIENT
Start: 2018-09-21 | End: 2018-09-21 | Stop reason: DRUGHIGH

## 2018-09-21 NOTE — PROGRESS NOTES
"Subjective:      Patient ID: Gloria Hale is a 78 y.o. female.    Chief Complaint: Cough and Shortness of Breath    Cough   This is a recurrent problem. The current episode started more than 1 month ago. The problem has been gradually worsening. The cough is productive of sputum. Associated symptoms include nasal congestion, rhinorrhea, shortness of breath and wheezing. Pertinent negatives include no chest pain, chills, ear congestion, ear pain, fever, headaches, myalgias, postnasal drip, rash or sore throat. She has tried a beta-agonist inhaler, ipratropium inhaler and oral steroids (recently completed a steroid taper.  approx. 3 days after completion of steroids, her symptoms worsened again.) for the symptoms. The treatment provided mild relief. Her past medical history is significant for COPD. lung cancer   Shortness of Breath   Associated symptoms include rhinorrhea and wheezing. Pertinent negatives include no chest pain, ear pain, fever, headaches, leg swelling, rash or sore throat. Her past medical history is significant for COPD. (Lung cancer)     Review of Systems   Constitutional: Negative for chills, fatigue and fever.   HENT: Positive for rhinorrhea. Negative for congestion, ear pain, postnasal drip, sinus pressure, sinus pain and sore throat.    Respiratory: Positive for cough, shortness of breath and wheezing.    Cardiovascular: Negative for chest pain, palpitations and leg swelling.   Musculoskeletal: Negative for myalgias.   Skin: Negative for rash and wound.   Neurological: Negative for weakness, numbness and headaches.   Psychiatric/Behavioral: The patient is not nervous/anxious.        Objective:     BP (!) 140/65   Pulse 77   Temp 98.1 °F (36.7 °C) (Oral)   Ht 5' 1.5" (1.562 m)   Wt 64.5 kg (142 lb 3.2 oz)   SpO2 96%   BMI 26.43 kg/m²     Physical Exam   Constitutional: She is oriented to person, place, and time. She appears well-developed and well-nourished.   HENT:   Head: Normocephalic. "   Eyes: Pupils are equal, round, and reactive to light.   Neck: Normal range of motion. Neck supple.   Cardiovascular: Normal rate, regular rhythm and normal heart sounds.   Pulmonary/Chest: Effort normal. No respiratory distress. She has no decreased breath sounds. She has wheezes. She has no rhonchi. She has no rales.   Neurological: She is alert and oriented to person, place, and time.   Skin: Skin is warm and dry. No rash noted.   Psychiatric: She has a normal mood and affect. Her behavior is normal. Judgment and thought content normal.   Vitals reviewed.    Assessment:     1. COPD with acute exacerbation    2. Essential hypertension        Plan:     Problem List Items Addressed This Visit        Pulmonary    COPD with acute exacerbation - Primary    Relevant Medications    budesonide-formoterol 160-4.5 mcg (SYMBICORT) 160-4.5 mcg/actuation HFAA    predniSONE (DELTASONE) 5 MG tablet       Cardiac/Vascular    HTN (hypertension)      Continue increased dose of diltiazem  Follow-up with Dr. Bryan, pulmonology  Report to ER if symptoms worsen    Follow-up in about 2 weeks (around 10/5/2018), or if symptoms worsen or fail to improve.        Parts of this note was dictated using voice recognition software. Please excuse any grammatical or typographical errors.

## 2018-10-09 ENCOUNTER — OFFICE VISIT (OUTPATIENT)
Dept: FAMILY MEDICINE | Facility: CLINIC | Age: 78
End: 2018-10-09
Payer: MEDICARE

## 2018-10-09 ENCOUNTER — PATIENT OUTREACH (OUTPATIENT)
Dept: ADMINISTRATIVE | Facility: HOSPITAL | Age: 78
End: 2018-10-09

## 2018-10-09 VITALS
WEIGHT: 141 LBS | HEIGHT: 61 IN | BODY MASS INDEX: 26.62 KG/M2 | DIASTOLIC BLOOD PRESSURE: 72 MMHG | TEMPERATURE: 99 F | HEART RATE: 69 BPM | SYSTOLIC BLOOD PRESSURE: 150 MMHG

## 2018-10-09 DIAGNOSIS — R11.0 NAUSEA: ICD-10-CM

## 2018-10-09 DIAGNOSIS — I10 ESSENTIAL HYPERTENSION: Primary | ICD-10-CM

## 2018-10-09 DIAGNOSIS — Z12.31 ENCOUNTER FOR SCREENING MAMMOGRAM FOR BREAST CANCER: ICD-10-CM

## 2018-10-09 DIAGNOSIS — J44.1 COPD WITH ACUTE EXACERBATION: ICD-10-CM

## 2018-10-09 DIAGNOSIS — Z23 NEED FOR INFLUENZA VACCINATION: ICD-10-CM

## 2018-10-09 PROCEDURE — 99213 OFFICE O/P EST LOW 20 MIN: CPT | Mod: S$PBB,,, | Performed by: NURSE PRACTITIONER

## 2018-10-09 PROCEDURE — 99999 PR PBB SHADOW E&M-EST. PATIENT-LVL III: CPT | Mod: PBBFAC,,, | Performed by: NURSE PRACTITIONER

## 2018-10-09 PROCEDURE — 99213 OFFICE O/P EST LOW 20 MIN: CPT | Mod: PBBFAC,PO | Performed by: NURSE PRACTITIONER

## 2018-10-09 RX ORDER — METOPROLOL SUCCINATE 50 MG/1
50 TABLET, EXTENDED RELEASE ORAL DAILY
Qty: 90 TABLET | Refills: 3 | Status: SHIPPED | OUTPATIENT
Start: 2018-10-09 | End: 2019-10-24 | Stop reason: SDUPTHER

## 2018-10-09 RX ORDER — ONDANSETRON 4 MG/1
4 TABLET, FILM COATED ORAL EVERY 8 HOURS PRN
Qty: 30 TABLET | Refills: 0 | Status: SHIPPED | OUTPATIENT
Start: 2018-10-09 | End: 2018-11-02 | Stop reason: ALTCHOICE

## 2018-10-09 NOTE — PROGRESS NOTES
"Subjective:      Patient ID: Gloria Hale is a 78 y.o. female.    Chief Complaint: Follow-up ( medication )    HPI   The patient voices a diagnosis of hypertension for which she currently takes Cardizem CD and Toprol-XL.  She is tolerating the medication well and is in good compliance.  The patient is experiencing no side effects.  She denies a reduced salt intake.  She denies a regular exercise regimen.  She denies chest pain, palpitations, headache, blurred vision, leg swelling, neck pain, nausea, vomiting, diaphoresis, paroxysmal nocturnal dyspnea, or orthopnea.   She also requesting refill for Zofran which she takes p.r.n. Nausea.  She is requesting to have her mammogram at next visit.    Review of Systems   Constitutional: Negative for chills, fatigue and fever.   Respiratory: Positive for cough (chronic), shortness of breath and wheezing.    Cardiovascular: Negative for chest pain, palpitations and leg swelling.   Gastrointestinal: Positive for nausea. Negative for abdominal distention, abdominal pain, blood in stool, constipation, diarrhea and vomiting.   Skin: Negative for rash and wound.   Neurological: Negative for weakness and numbness.   Psychiatric/Behavioral: The patient is not nervous/anxious.        Objective:     BP (!) 150/72 Comment: manual bp  Pulse 69   Temp 98.5 °F (36.9 °C) (Oral)   Ht 5' 1" (1.549 m)   Wt 64 kg (141 lb)   BMI 26.64 kg/m²     Physical Exam   Constitutional: She is oriented to person, place, and time. She appears well-developed and well-nourished.   HENT:   Head: Normocephalic.   Eyes: Pupils are equal, round, and reactive to light.   Neck: Normal range of motion. Neck supple.   Cardiovascular: Normal rate, regular rhythm and normal heart sounds.   Pulmonary/Chest: Effort normal. No respiratory distress. She has decreased breath sounds in the left upper field. She has no wheezes. She has no rhonchi. She has no rales.   Neurological: She is alert and oriented to person, " place, and time.   Skin: Skin is warm and dry. No rash noted.   Psychiatric: She has a normal mood and affect. Her behavior is normal. Judgment and thought content normal.   Vitals reviewed.    Assessment:     1. Essential hypertension    2. COPD with acute exacerbation    3. Nausea    4. Encounter for screening mammogram for breast cancer    5. Need for influenza vaccination        Plan:     Problem List Items Addressed This Visit        Pulmonary    COPD with acute exacerbation       Cardiac/Vascular    HTN (hypertension) - Primary      Other Visit Diagnoses     Nausea        Encounter for screening mammogram for breast cancer        Relevant Orders    Mammo Digital Screening Bilateral With CAD    Need for influenza vaccination        Relevant Orders    Influenza - High Dose (65+) (PF) (IM)      Keep appointment with Dr. Bryan on Friday    Follow-up in about 2 weeks (around 10/23/2018), or if symptoms worsen or fail to improve, for blood pressure check.        Parts of this note was dictated using voice recognition software. Please excuse any grammatical or typographical errors.

## 2018-10-23 ENCOUNTER — OFFICE VISIT (OUTPATIENT)
Dept: FAMILY MEDICINE | Facility: CLINIC | Age: 78
End: 2018-10-23
Payer: MEDICARE

## 2018-10-23 ENCOUNTER — HOSPITAL ENCOUNTER (OUTPATIENT)
Dept: RADIOLOGY | Facility: HOSPITAL | Age: 78
Discharge: HOME | End: 2018-10-23
Attending: NURSE PRACTITIONER
Payer: MEDICARE

## 2018-10-23 VITALS
HEART RATE: 55 BPM | WEIGHT: 146 LBS | SYSTOLIC BLOOD PRESSURE: 136 MMHG | HEIGHT: 62 IN | DIASTOLIC BLOOD PRESSURE: 66 MMHG | RESPIRATION RATE: 14 BRPM | BODY MASS INDEX: 26.87 KG/M2 | TEMPERATURE: 98 F

## 2018-10-23 VITALS — BODY MASS INDEX: 26.65 KG/M2 | HEIGHT: 61 IN | WEIGHT: 141.13 LBS

## 2018-10-23 DIAGNOSIS — C34.11 CANCER OF UPPER LOBE OF RIGHT LUNG: ICD-10-CM

## 2018-10-23 DIAGNOSIS — N17.9 AKI (ACUTE KIDNEY INJURY): ICD-10-CM

## 2018-10-23 DIAGNOSIS — I10 ESSENTIAL HYPERTENSION: Primary | ICD-10-CM

## 2018-10-23 DIAGNOSIS — Z12.31 ENCOUNTER FOR SCREENING MAMMOGRAM FOR BREAST CANCER: ICD-10-CM

## 2018-10-23 DIAGNOSIS — J44.9 CHRONIC OBSTRUCTIVE PULMONARY DISEASE, UNSPECIFIED COPD TYPE: ICD-10-CM

## 2018-10-23 PROCEDURE — 77063 BREAST TOMOSYNTHESIS BI: CPT | Mod: TC,PO

## 2018-10-23 PROCEDURE — 77067 SCR MAMMO BI INCL CAD: CPT | Mod: 26,,, | Performed by: RADIOLOGY

## 2018-10-23 PROCEDURE — 77067 SCR MAMMO BI INCL CAD: CPT | Mod: TC,PO

## 2018-10-23 PROCEDURE — 99213 OFFICE O/P EST LOW 20 MIN: CPT | Mod: PBBFAC,PO | Performed by: NURSE PRACTITIONER

## 2018-10-23 PROCEDURE — 99213 OFFICE O/P EST LOW 20 MIN: CPT | Mod: S$PBB,,, | Performed by: NURSE PRACTITIONER

## 2018-10-23 PROCEDURE — 77063 BREAST TOMOSYNTHESIS BI: CPT | Mod: 26,,, | Performed by: RADIOLOGY

## 2018-10-23 PROCEDURE — 99999 PR PBB SHADOW E&M-EST. PATIENT-LVL III: CPT | Mod: PBBFAC,,, | Performed by: NURSE PRACTITIONER

## 2018-10-23 NOTE — PROGRESS NOTES
"Subjective:      Patient ID: Gloria Hale is a 78 y.o. female.    Chief Complaint: Follow-up    HPI   Patient to clinic for follow-up on hypertension.  She is compliant with her medications but still has some elevated blood pressure readings.  She does not follow a reduce sodium diet and she does not exercise.  She has COPD treated by Dr. Bryan.  She does admit to chronic shortness of breath and cough.  She has had acute kidney injury when she had pneumonia earlier this year and has not been followed up on.  She denies chest pain, palpitations, headache, blurred vision.  She reports occasional, intermittent, mild lower extremity edema. No edema today.    Review of Systems   Constitutional: Negative for chills, fatigue and fever.   Respiratory: Positive for cough, shortness of breath and wheezing.    Cardiovascular: Positive for leg swelling (None currently). Negative for chest pain and palpitations.   Skin: Negative for rash and wound.   Neurological: Negative for weakness and numbness.   Psychiatric/Behavioral: The patient is not nervous/anxious.        Objective:     /66   Pulse (!) 55   Temp 97.8 °F (36.6 °C) (Oral)   Resp 14   Ht 5' 1.5" (1.562 m)   Wt 66.2 kg (146 lb)   BMI 27.14 kg/m²     Physical Exam   Constitutional: She is oriented to person, place, and time. She appears well-developed and well-nourished.   HENT:   Head: Normocephalic.   Eyes: Pupils are equal, round, and reactive to light.   Neck: Normal range of motion. Neck supple.   Cardiovascular: Normal rate, regular rhythm and normal heart sounds.   Pulmonary/Chest: Effort normal and breath sounds normal. No respiratory distress. She has no decreased breath sounds. She has no wheezes. She has no rhonchi. She has no rales.   Musculoskeletal: She exhibits no edema.   Neurological: She is alert and oriented to person, place, and time.   Skin: Skin is warm and dry. No rash noted.   Psychiatric: She has a normal mood and affect. Her behavior " is normal. Judgment and thought content normal.   Vitals reviewed.    Assessment:     1. Essential hypertension    2. Chronic obstructive pulmonary disease, unspecified COPD type    3. Cancer of upper lobe of right lung    4. CORINE (acute kidney injury)        Plan:     Problem List Items Addressed This Visit        Cardiac/Vascular    HTN (hypertension) - Primary    Relevant Orders    Basic metabolic panel       Renal/    CORINE (acute kidney injury)       Oncology    Cancer of upper lobe of right lung      Other Visit Diagnoses     Chronic obstructive pulmonary disease, unspecified COPD type          Avoid NSAIDs and PPIs  Will refer to nephrology pending labs  Reports to ER if symptoms worsen    Follow-up in about 3 months (around 1/23/2019), or if symptoms worsen or fail to improve.        Parts of this note was dictated using voice recognition software. Please excuse any grammatical or typographical errors.

## 2018-10-24 DIAGNOSIS — N18.30 CKD (CHRONIC KIDNEY DISEASE) STAGE 3, GFR 30-59 ML/MIN: Primary | ICD-10-CM

## 2018-11-02 ENCOUNTER — LAB VISIT (OUTPATIENT)
Dept: LAB | Facility: HOSPITAL | Age: 78
End: 2018-11-02
Attending: INTERNAL MEDICINE
Payer: MEDICARE

## 2018-11-02 ENCOUNTER — OFFICE VISIT (OUTPATIENT)
Dept: NEPHROLOGY | Facility: CLINIC | Age: 78
End: 2018-11-02
Payer: MEDICARE

## 2018-11-02 VITALS
HEART RATE: 60 BPM | BODY MASS INDEX: 26.62 KG/M2 | SYSTOLIC BLOOD PRESSURE: 116 MMHG | HEIGHT: 61 IN | WEIGHT: 141 LBS | DIASTOLIC BLOOD PRESSURE: 74 MMHG

## 2018-11-02 DIAGNOSIS — N17.9 AKI (ACUTE KIDNEY INJURY): ICD-10-CM

## 2018-11-02 DIAGNOSIS — J44.9 CHRONIC OBSTRUCTIVE PULMONARY DISEASE, UNSPECIFIED COPD TYPE: ICD-10-CM

## 2018-11-02 DIAGNOSIS — I10 ESSENTIAL HYPERTENSION: ICD-10-CM

## 2018-11-02 DIAGNOSIS — E78.5 DYSLIPIDEMIA: Chronic | ICD-10-CM

## 2018-11-02 DIAGNOSIS — N17.9 AKI (ACUTE KIDNEY INJURY): Primary | ICD-10-CM

## 2018-11-02 DIAGNOSIS — C34.11 CANCER OF UPPER LOBE OF RIGHT LUNG: ICD-10-CM

## 2018-11-02 DIAGNOSIS — R73.9 HYPERGLYCEMIA: ICD-10-CM

## 2018-11-02 DIAGNOSIS — Z87.891 FORMER SMOKER: ICD-10-CM

## 2018-11-02 DIAGNOSIS — I47.19 ECTOPIC ATRIAL TACHYCARDIA: ICD-10-CM

## 2018-11-02 DIAGNOSIS — J18.9 PNEUMONIA OF LEFT LOWER LOBE DUE TO INFECTIOUS ORGANISM: ICD-10-CM

## 2018-11-02 DIAGNOSIS — N18.30 CKD (CHRONIC KIDNEY DISEASE) STAGE 3, GFR 30-59 ML/MIN: ICD-10-CM

## 2018-11-02 DIAGNOSIS — E03.9 ACQUIRED HYPOTHYROIDISM: ICD-10-CM

## 2018-11-02 DIAGNOSIS — N39.0 UTI (URINARY TRACT INFECTION), UNCOMPLICATED: ICD-10-CM

## 2018-11-02 DIAGNOSIS — M85.80 OSTEOPENIA, UNSPECIFIED LOCATION: Chronic | ICD-10-CM

## 2018-11-02 LAB
BACTERIA #/AREA URNS HPF: NORMAL /HPF
BILIRUB UR QL STRIP: NEGATIVE
CLARITY UR: CLEAR
COLOR UR: YELLOW
CREAT UR-MCNC: 70 MG/DL
GLUCOSE UR QL STRIP: NEGATIVE
HGB UR QL STRIP: ABNORMAL
HYALINE CASTS #/AREA URNS LPF: 0 /LPF
KETONES UR QL STRIP: NEGATIVE
LEUKOCYTE ESTERASE UR QL STRIP: NEGATIVE
MICROSCOPIC COMMENT: NORMAL
NITRITE UR QL STRIP: NEGATIVE
PH UR STRIP: 6 [PH] (ref 5–8)
PROT UR QL STRIP: ABNORMAL
PROT UR-MCNC: 49 MG/DL
PROT/CREAT UR: 0.7 MG/G{CREAT}
RBC #/AREA URNS HPF: 1 /HPF (ref 0–4)
SP GR UR STRIP: 1.02 (ref 1–1.03)
SQUAMOUS #/AREA URNS HPF: 1 /HPF
URN SPEC COLLECT METH UR: ABNORMAL
WBC #/AREA URNS HPF: 1 /HPF (ref 0–5)

## 2018-11-02 PROCEDURE — 81000 URINALYSIS NONAUTO W/SCOPE: CPT | Mod: PO

## 2018-11-02 PROCEDURE — 99213 OFFICE O/P EST LOW 20 MIN: CPT | Mod: PBBFAC,PO | Performed by: INTERNAL MEDICINE

## 2018-11-02 PROCEDURE — 87086 URINE CULTURE/COLONY COUNT: CPT

## 2018-11-02 PROCEDURE — 99204 OFFICE O/P NEW MOD 45 MIN: CPT | Mod: S$PBB,,, | Performed by: INTERNAL MEDICINE

## 2018-11-02 PROCEDURE — 99999 PR PBB SHADOW E&M-EST. PATIENT-LVL III: CPT | Mod: PBBFAC,,, | Performed by: INTERNAL MEDICINE

## 2018-11-02 PROCEDURE — 82570 ASSAY OF URINE CREATININE: CPT

## 2018-11-02 NOTE — LETTER
November 7, 2018      Walter Montiel NP  [address] [facility] [address]           [facility]  [address] [facility] [address]  Phone: [phone]  Fax: [phone]          Patient: Gloria Hale   MR Number: [id number]   YOB: 1940   Date of Visit: 11/2/2018       Dear Walter Montiel:    Thank you for referring Gloria Hale to me for evaluation. Attached you will find relevant portions of my assessment and plan of care.    If you have questions, please do not hesitate to call me. I look forward to following Gloria Hale along with you.    Sincerely,    Li Sanchez  CC:  No Recipients    If you would like to receive this communication electronically, please contact [email or web address] or [phone] to request more information on [facility] access.    For providers and/or their staff who would like to refer a patient to [facility], please contact us through our one-stop-shop provider referral line, Lashonda Whitfield, at [phone].    If you feel you have received this communication in error or would no longer like to receive these types of communications, please e-mail [email or web address]

## 2018-11-04 LAB — BACTERIA UR CULT: NORMAL

## 2018-11-05 ENCOUNTER — TELEPHONE (OUTPATIENT)
Dept: NEPHROLOGY | Facility: CLINIC | Age: 78
End: 2018-11-05

## 2018-11-05 DIAGNOSIS — N17.8 ACUTE RENAL FAILURE WITH OTHER SPECIFIED PATHOLOGICAL LESION IN KIDNEY: Primary | ICD-10-CM

## 2018-11-06 NOTE — TELEPHONE ENCOUNTER
Please inform pt that all of her her kidney specific tests are normal and her kidney function looks even better than her last kidney funtion test.     But her kidney function is not quite back to her usual normal before she was very ill in March.    Plan: will check kidney function with one tube of blood in 4 weeks and will call with results. I will also mail her a copy of all of her lab results with my written comments     Order in, please schedule in Hannah. Thank you.

## 2018-11-07 PROBLEM — J44.9 COPD (CHRONIC OBSTRUCTIVE PULMONARY DISEASE): Status: ACTIVE | Noted: 2018-11-07

## 2018-11-07 PROBLEM — N18.30 CKD (CHRONIC KIDNEY DISEASE) STAGE 3, GFR 30-59 ML/MIN: Status: ACTIVE | Noted: 2018-11-07

## 2018-11-07 PROBLEM — Z87.891 FORMER SMOKER: Status: ACTIVE | Noted: 2018-11-07

## 2018-11-08 NOTE — PROGRESS NOTES
Subjective:       Patient ID: Gloria Hale is a 78 y.o. White female who presents for new evaluation of Chronic Kidney Disease; Acute Kidney Injury; and Consult    HPI     She is referred by her PCP for decreased GFR, technically CKD Stage 3    She was acutely ill at [facility] in March 2018 for pneumonia/sepsis. She sustained a CORINE episode likely from ATN as well as volume depletion as she was lying on her sofa for almost two weeks prior to hospital admit with almost no po intake. (She is  and lives alone) Her peak creatinine was 4.8 and at time of discharge it decreased to 2. Her pre hospital creatinine ranged 0.9-1mg/dL. She never required HD during that hospital stay    Her most recent chemistry from late October 2013 revealed a GFR of 39ml/min thus prompting nephrology consult.    She denies foamy urine, no hematuria and no flank pain. No frequent UTIs. She follows a low sodium diet but she admits she has poor po fluid intake. She can develop LE edema at the end of the day and SOB/DOMINGUEZ is chronic from COPD. No NSAID use and no herbal medications. No known family history of kidney disease    Review of Systems   Constitutional: Positive for fatigue (chronic). Negative for activity change, appetite change and unexpected weight change.   HENT: Negative for facial swelling.    Eyes: Negative for visual disturbance.   Respiratory: Positive for cough, shortness of breath and wheezing.    Cardiovascular: Positive for leg swelling (at end of day). Negative for chest pain.   Gastrointestinal: Positive for constipation (uses stool softeners). Negative for abdominal pain.   Genitourinary: Positive for frequency and urgency. Negative for difficulty urinating, dysuria, flank pain and hematuria.   Musculoskeletal: Positive for gait problem (uses walker for 'long distances' which includes parking lot at clinic to check in at lobby). Negative for arthralgias.   Skin: Negative for rash.   Neurological: Negative for weakness.    Hematological: Bruises/bleeds easily.   Psychiatric/Behavioral: Positive for sleep disturbance (insomnia).       Objective:      Physical Exam   Constitutional: She is oriented to person, place, and time. She appears well-nourished. No distress.   HENT:   Mouth/Throat: Oropharynx is clear and moist.   Eyes: No scleral icterus.   Neck: No JVD present.   Cardiovascular: Normal rate, S1 normal and S2 normal. Exam reveals no friction rub.   Pulmonary/Chest: She has wheezes. She has no rales.   Abdominal: Soft. There is no tenderness.   Musculoskeletal: She exhibits no edema.   Neurological: She is alert and oriented to person, place, and time.   Skin: Skin is warm and dry.   Psychiatric: She has a normal mood and affect.   Nursing note and vitals reviewed.      Assessment:       1. CORINE (acute kidney injury)    2. Hyperglycemia    3. UTI (urinary tract infection), uncomplicated    4. CKD (chronic kidney disease) stage 3, GFR 30-59 ml/min    5. Essential hypertension    6. Chronic obstructive pulmonary disease, unspecified COPD type    7. Pneumonia of left lower lobe due to infectious organism    8. Dyslipidemia    9. Cancer of upper lobe of right lung    10. Acquired hypothyroidism    11. Osteopenia, unspecified location    12. Former smoker, quit around 2000        Plan:              Prior to her acute illness in March her kidney function was acceptable at Stage 2 given her age, subsequently her GFR is lower and I suspect she incurred some permament damage from her CORINE episode. She also admits to poor po intake and I have asked her to push fluids and will repeat chemistries.    Since there is a lab gap from March until October measuring kidney function I would like to perform a work up to include checking a few serologies, screening for DM, ruling out paraprotein disease and checking urine studies. Renal u/s reviewed with pt from March earlier this year when she had CORINE.       Non-fasting labs then RTC pending  results

## 2018-11-19 ENCOUNTER — OFFICE VISIT (OUTPATIENT)
Dept: FAMILY MEDICINE | Facility: CLINIC | Age: 78
End: 2018-11-19
Payer: MEDICARE

## 2018-11-19 VITALS
HEIGHT: 62 IN | BODY MASS INDEX: 26.75 KG/M2 | SYSTOLIC BLOOD PRESSURE: 136 MMHG | TEMPERATURE: 98 F | DIASTOLIC BLOOD PRESSURE: 72 MMHG | HEART RATE: 66 BPM | WEIGHT: 145.38 LBS

## 2018-11-19 DIAGNOSIS — S39.012A LOW BACK STRAIN, INITIAL ENCOUNTER: Primary | ICD-10-CM

## 2018-11-19 PROCEDURE — 99213 OFFICE O/P EST LOW 20 MIN: CPT | Mod: S$PBB,,, | Performed by: NURSE PRACTITIONER

## 2018-11-19 PROCEDURE — 99999 PR PBB SHADOW E&M-EST. PATIENT-LVL III: CPT | Mod: PBBFAC,,, | Performed by: NURSE PRACTITIONER

## 2018-11-19 PROCEDURE — 99213 OFFICE O/P EST LOW 20 MIN: CPT | Mod: PBBFAC,PO | Performed by: NURSE PRACTITIONER

## 2018-11-19 RX ORDER — DEXTROMETHORPHAN HYDROBROMIDE, GUAIFENESIN 5; 100 MG/5ML; MG/5ML
650 LIQUID ORAL EVERY 8 HOURS
Refills: 0 | COMMUNITY
Start: 2018-11-19 | End: 2019-07-22

## 2018-11-19 RX ORDER — TIZANIDINE 2 MG/1
4 TABLET ORAL EVERY 6 HOURS PRN
Qty: 30 TABLET | Refills: 0 | Status: SHIPPED | OUTPATIENT
Start: 2018-11-19 | End: 2018-11-29

## 2018-11-19 NOTE — PROGRESS NOTES
"Subjective:      Patient ID: Gloria Hale is a 78 y.o. female.    Chief Complaint: Pain (right flank area)    Pain   This is a new problem. The current episode started yesterday (Pain onset after lifting heavy garbage bag yesterday). The problem occurs intermittently. The problem has been waxing and waning. Pertinent negatives include no chest pain, chills, coughing, diaphoresis, fatigue, fever, headaches, joint swelling, numbness, rash, sore throat, swollen glands, urinary symptoms or weakness. The symptoms are aggravated by twisting and coughing (deep breaths). Treatments tried: Tylenol and Motrin. The treatment provided mild relief.     Review of Systems   Constitutional: Negative for chills, diaphoresis, fatigue and fever.   HENT: Negative for sore throat.    Respiratory: Negative for cough, shortness of breath and wheezing.    Cardiovascular: Negative for chest pain, palpitations and leg swelling.   Musculoskeletal: Positive for back pain (right mid to lower back). Negative for joint swelling.   Skin: Negative for rash and wound.   Neurological: Negative for weakness, numbness and headaches.   Psychiatric/Behavioral: The patient is not nervous/anxious.        Objective:     /72   Pulse 66   Temp 97.9 °F (36.6 °C) (Oral)   Ht 5' 1.5" (1.562 m)   Wt 66 kg (145 lb 6.4 oz)   BMI 27.03 kg/m²     Physical Exam   Constitutional: She is oriented to person, place, and time. She appears well-developed and well-nourished.   HENT:   Head: Normocephalic.   Eyes: Pupils are equal, round, and reactive to light.   Neck: Normal range of motion. Neck supple.   Cardiovascular: Normal rate, regular rhythm and normal heart sounds.   Pulmonary/Chest: Effort normal and breath sounds normal. No respiratory distress. She has no decreased breath sounds. She has no wheezes. She has no rhonchi. She has no rales.   Musculoskeletal:        Lumbar back: She exhibits pain and spasm. She exhibits normal range of motion, no " tenderness, no bony tenderness, no swelling, no edema, no deformity, no laceration and normal pulse.        Back:    Neurological: She is alert and oriented to person, place, and time.   Skin: Skin is warm and dry. No rash noted.   Psychiatric: She has a normal mood and affect. Her behavior is normal. Judgment and thought content normal.   Vitals reviewed.    Assessment:     1. Low back strain, initial encounter        Plan:     Problem List Items Addressed This Visit     None      Visit Diagnoses     Low back strain, initial encounter    -  Primary    Relevant Medications    acetaminophen (TYLENOL) 650 MG TbSR    tiZANidine (ZANAFLEX) 2 MG tablet      Symptomatic care discussed   Report to ER if symptoms worsen    Follow-up if symptoms worsen or fail to improve.        Parts of this note was dictated using voice recognition software. Please excuse any grammatical or typographical errors.

## 2018-12-03 RX ORDER — ROSUVASTATIN CALCIUM 40 MG/1
TABLET, COATED ORAL
Qty: 90 TABLET | Refills: 0 | Status: SHIPPED | OUTPATIENT
Start: 2018-12-03 | End: 2019-04-15 | Stop reason: SDUPTHER

## 2018-12-27 ENCOUNTER — NURSE TRIAGE (OUTPATIENT)
Dept: ADMINISTRATIVE | Facility: CLINIC | Age: 78
End: 2018-12-27

## 2018-12-27 NOTE — TELEPHONE ENCOUNTER
Reason for Disposition   SEVERE difficulty breathing (e.g., struggling for each breath, speaks in single words, pulse > 120)    Protocols used: ST BREATHING DIFFICULTY-A-OH    Ms. Hale states she is short of breath with speaking. Patient states she is congested. She is currently treating symptoms with Mucinex and breathing treatments. Patient advised to call 911 due to severity of sob. Patient refused, she is requesting an appointment for tomorrow.

## 2018-12-27 NOTE — TELEPHONE ENCOUNTER
Pt states she will go [facility] [place] [facility] will drive her. Will call if she needs anything else

## 2019-01-04 ENCOUNTER — OFFICE VISIT (OUTPATIENT)
Dept: FAMILY MEDICINE | Facility: CLINIC | Age: 79
End: 2019-01-04
Payer: MEDICARE

## 2019-01-04 DIAGNOSIS — J44.1 COPD WITH ACUTE EXACERBATION: Primary | ICD-10-CM

## 2019-01-04 PROCEDURE — 99213 PR OFFICE/OUTPT VISIT, EST, LEVL III, 20-29 MIN: ICD-10-PCS | Mod: S$PBB,,, | Performed by: NURSE PRACTITIONER

## 2019-01-04 PROCEDURE — 99999 PR PBB SHADOW E&M-EST. PATIENT-LVL III: CPT | Mod: PBBFAC,,, | Performed by: NURSE PRACTITIONER

## 2019-01-04 PROCEDURE — 99999 PR PBB SHADOW E&M-EST. PATIENT-LVL III: ICD-10-PCS | Mod: PBBFAC,,, | Performed by: NURSE PRACTITIONER

## 2019-01-04 PROCEDURE — 99213 OFFICE O/P EST LOW 20 MIN: CPT | Mod: S$PBB,,, | Performed by: NURSE PRACTITIONER

## 2019-01-04 PROCEDURE — 99213 OFFICE O/P EST LOW 20 MIN: CPT | Mod: PBBFAC,PO | Performed by: NURSE PRACTITIONER

## 2019-01-04 RX ORDER — METHYLPREDNISOLONE 4 MG/1
TABLET ORAL
Qty: 1 PACKAGE | Refills: 0 | Status: SHIPPED | OUTPATIENT
Start: 2019-01-04 | End: 2019-01-10

## 2019-01-04 RX ORDER — MONTELUKAST SODIUM 10 MG/1
10 TABLET ORAL NIGHTLY
Qty: 10 TABLET | Refills: 0 | Status: SHIPPED | OUTPATIENT
Start: 2019-01-04 | End: 2019-01-14

## 2019-01-04 RX ORDER — DOXYCYCLINE HYCLATE 100 MG
100 TABLET ORAL 2 TIMES DAILY
Qty: 20 TABLET | Refills: 0 | Status: SHIPPED | OUTPATIENT
Start: 2019-01-04 | End: 2019-01-14

## 2019-01-04 RX ORDER — CLOTRIMAZOLE AND BETAMETHASONE DIPROPIONATE 10; .64 MG/G; MG/G
CREAM TOPICAL
COMMUNITY
Start: 2017-08-11

## 2019-01-04 NOTE — PROGRESS NOTES
"Subjective:      Patient ID: Gloria Hale is a 78 y.o. female.    Chief Complaint: Cough and Chest Congestion    Cough   This is a chronic problem. The current episode started in the past 7 days (worsened over the last several day). The problem has been gradually worsening. The cough is productive of sputum. Associated symptoms include postnasal drip, rhinorrhea and wheezing. Pertinent negatives include no chest pain, chills, ear congestion, ear pain, fever, headaches, hemoptysis, nasal congestion, rash or shortness of breath. Nothing aggravates the symptoms. She has tried a beta-agonist inhaler, steroid inhaler and oral steroids for the symptoms. The treatment provided mild relief. Her past medical history is significant for COPD.     Review of Systems   Constitutional: Negative for chills, fatigue and fever.   HENT: Positive for postnasal drip and rhinorrhea. Negative for congestion, ear pain, sinus pressure and sinus pain.    Respiratory: Positive for cough, chest tightness (described as chest congestion) and wheezing. Negative for hemoptysis and shortness of breath.    Cardiovascular: Negative for chest pain, palpitations and leg swelling.   Skin: Negative for rash and wound.   Neurological: Negative for weakness, numbness and headaches.   Psychiatric/Behavioral: The patient is not nervous/anxious.        Objective:     /76 Comment: manual bp  Pulse (!) 55   Temp 97.8 °F (36.6 °C) (Oral)   Ht 5' 1.5" (1.562 m)   Wt 61.2 kg (135 lb)   BMI 25.09 kg/m²     Physical Exam   Constitutional: She is oriented to person, place, and time. She appears well-developed and well-nourished.   HENT:   Head: Normocephalic.   Right Ear: A middle ear effusion is present.   Left Ear: A middle ear effusion is present.   Nose: Rhinorrhea (nasal mucosa pale and boggy) present. No mucosal edema. Right sinus exhibits no maxillary sinus tenderness and no frontal sinus tenderness. Left sinus exhibits no maxillary sinus tenderness " and no frontal sinus tenderness.   Mouth/Throat: Posterior oropharyngeal erythema (clear, post nasal drainage noted to posterior oropharynx) present. No oropharyngeal exudate or posterior oropharyngeal edema.   Eyes: Conjunctivae and lids are normal. Pupils are equal, round, and reactive to light.   Neck: Normal range of motion and full passive range of motion without pain. Neck supple.   Cardiovascular: Normal rate, regular rhythm and normal heart sounds.   Pulmonary/Chest: Effort normal. No respiratory distress. She has no decreased breath sounds. She has no wheezes. She has rhonchi (faint, scattered). She has no rales.   Lymphadenopathy:     She has no cervical adenopathy.   Neurological: She is alert and oriented to person, place, and time.   Skin: Skin is warm and dry. No rash noted.   Psychiatric: She has a normal mood and affect. Her behavior is normal. Judgment and thought content normal.     Assessment:     1. COPD with acute exacerbation        Plan:     Problem List Items Addressed This Visit        Pulmonary    COPD with acute exacerbation - Primary    Relevant Medications    montelukast (SINGULAIR) 10 mg tablet    methylPREDNISolone (MEDROL DOSEPACK) 4 mg tablet    doxycycline (VIBRA-TABS) 100 MG tablet      Symptomatic care discussed   Report to ER if symptoms worsen    Follow-up if symptoms worsen or fail to improve.        Parts of this note was dictated using voice recognition software. Please excuse any grammatical or typographical errors.

## 2019-01-08 VITALS
HEART RATE: 55 BPM | SYSTOLIC BLOOD PRESSURE: 138 MMHG | TEMPERATURE: 98 F | DIASTOLIC BLOOD PRESSURE: 76 MMHG | BODY MASS INDEX: 24.84 KG/M2 | WEIGHT: 135 LBS | HEIGHT: 62 IN

## 2019-03-07 DIAGNOSIS — E03.9 ACQUIRED HYPOTHYROIDISM: ICD-10-CM

## 2019-03-07 RX ORDER — LEVOTHYROXINE SODIUM 75 UG/1
TABLET ORAL
Qty: 90 TABLET | Refills: 3 | Status: SHIPPED | OUTPATIENT
Start: 2019-03-07 | End: 2020-01-06 | Stop reason: SDUPTHER

## 2019-04-15 RX ORDER — ROSUVASTATIN CALCIUM 40 MG/1
TABLET, COATED ORAL
Qty: 90 TABLET | Refills: 0 | Status: SHIPPED | OUTPATIENT
Start: 2019-04-15 | End: 2019-04-16 | Stop reason: SDUPTHER

## 2019-04-16 RX ORDER — ROSUVASTATIN CALCIUM 40 MG/1
40 TABLET, COATED ORAL DAILY
Qty: 90 TABLET | Refills: 1 | Status: SHIPPED | OUTPATIENT
Start: 2019-04-16 | End: 2020-01-06 | Stop reason: SDUPTHER

## 2019-05-31 ENCOUNTER — EXTERNAL CHRONIC CARE MANAGEMENT (OUTPATIENT)
Dept: PRIMARY CARE CLINIC | Facility: CLINIC | Age: 79
End: 2019-05-31
Payer: MEDICARE

## 2019-05-31 PROCEDURE — 99490 PR CHRONIC CARE MGMT, 1ST 20 MIN: ICD-10-PCS | Mod: S$PBB,,, | Performed by: FAMILY MEDICINE

## 2019-05-31 PROCEDURE — 99490 CHRNC CARE MGMT STAFF 1ST 20: CPT | Mod: S$PBB,,, | Performed by: FAMILY MEDICINE

## 2019-05-31 PROCEDURE — 99490 CHRNC CARE MGMT STAFF 1ST 20: CPT | Mod: PBBFAC,PO | Performed by: FAMILY MEDICINE

## 2019-06-30 ENCOUNTER — EXTERNAL CHRONIC CARE MANAGEMENT (OUTPATIENT)
Dept: PRIMARY CARE CLINIC | Facility: CLINIC | Age: 79
End: 2019-06-30
Payer: MEDICARE

## 2019-06-30 PROCEDURE — 99490 CHRNC CARE MGMT STAFF 1ST 20: CPT | Mod: S$PBB,,, | Performed by: FAMILY MEDICINE

## 2019-06-30 PROCEDURE — 99490 CHRNC CARE MGMT STAFF 1ST 20: CPT | Mod: PBBFAC,PO | Performed by: FAMILY MEDICINE

## 2019-06-30 PROCEDURE — 99490 PR CHRONIC CARE MGMT, 1ST 20 MIN: ICD-10-PCS | Mod: S$PBB,,, | Performed by: FAMILY MEDICINE

## 2019-07-03 ENCOUNTER — OUTPATIENT CASE MANAGEMENT (OUTPATIENT)
Dept: ADMINISTRATIVE | Facility: OTHER | Age: 79
End: 2019-07-03

## 2019-07-03 DIAGNOSIS — J44.1 COPD WITH ACUTE EXACERBATION: Primary | ICD-10-CM

## 2019-07-03 NOTE — PROGRESS NOTES
Please note, patient's information has been sent to [facility]patient [facility] for high risk screening.    Reason for Referral:   COPD    Please contact [facility] with any questions (ext. [phone]).    Thank you,    Carline Mclean RN,CCM

## 2019-07-08 ENCOUNTER — OUTPATIENT CASE MANAGEMENT (OUTPATIENT)
Dept: ADMINISTRATIVE | Facility: OTHER | Age: 79
End: 2019-07-08

## 2019-07-22 PROBLEM — R05.8 UPPER AIRWAY COUGH SYNDROME: Status: ACTIVE | Noted: 2019-07-22

## 2019-07-24 PROBLEM — R13.14 PHARYNGOESOPHAGEAL DYSPHAGIA: Status: ACTIVE | Noted: 2019-07-24

## 2019-08-31 ENCOUNTER — EXTERNAL CHRONIC CARE MANAGEMENT (OUTPATIENT)
Dept: PRIMARY CARE CLINIC | Facility: CLINIC | Age: 79
End: 2019-08-31
Payer: MEDICARE

## 2019-08-31 PROCEDURE — 99490 CHRNC CARE MGMT STAFF 1ST 20: CPT | Mod: S$PBB,,, | Performed by: FAMILY MEDICINE

## 2019-08-31 PROCEDURE — 99490 PR CHRONIC CARE MGMT, 1ST 20 MIN: ICD-10-PCS | Mod: S$PBB,,, | Performed by: FAMILY MEDICINE

## 2019-08-31 PROCEDURE — 99490 CHRNC CARE MGMT STAFF 1ST 20: CPT | Mod: PBBFAC,PO | Performed by: FAMILY MEDICINE

## 2019-09-16 ENCOUNTER — PES CALL (OUTPATIENT)
Dept: ADMINISTRATIVE | Facility: CLINIC | Age: 79
End: 2019-09-16

## 2019-09-26 DIAGNOSIS — I10 ESSENTIAL HYPERTENSION: ICD-10-CM

## 2019-09-26 DIAGNOSIS — I47.19 ECTOPIC ATRIAL TACHYCARDIA: ICD-10-CM

## 2019-09-26 RX ORDER — DILTIAZEM HYDROCHLORIDE 240 MG/1
CAPSULE, COATED, EXTENDED RELEASE ORAL
Qty: 180 CAPSULE | Refills: 0 | Status: SHIPPED | OUTPATIENT
Start: 2019-09-26 | End: 2020-01-06 | Stop reason: SDUPTHER

## 2019-09-26 NOTE — TELEPHONE ENCOUNTER
I refilled the requested medication x 1 month.    The patient is due for an visit in the office.  Call the patient on the phone and book the patient with Jenna Cobb NP for a visit.     PLEASE DOCUMENT THE FACT THAT YOU HAVE CONTACTED THE PATIENT IN THE CHART FOR FUTURE REFERENCE.    Health Maintenance Due   Topic Date Due    DEXA SCAN  12/14/2018    Lipid Panel  05/25/2019    Colonoscopy  06/19/2019

## 2019-09-30 ENCOUNTER — EXTERNAL CHRONIC CARE MANAGEMENT (OUTPATIENT)
Dept: PRIMARY CARE CLINIC | Facility: CLINIC | Age: 79
End: 2019-09-30
Payer: MEDICARE

## 2019-09-30 PROCEDURE — 99490 CHRNC CARE MGMT STAFF 1ST 20: CPT | Mod: S$PBB,,, | Performed by: FAMILY MEDICINE

## 2019-09-30 PROCEDURE — 99490 CHRNC CARE MGMT STAFF 1ST 20: CPT | Mod: PBBFAC,PO | Performed by: FAMILY MEDICINE

## 2019-09-30 PROCEDURE — 99490 PR CHRONIC CARE MGMT, 1ST 20 MIN: ICD-10-PCS | Mod: S$PBB,,, | Performed by: FAMILY MEDICINE

## 2019-10-24 ENCOUNTER — TELEPHONE (OUTPATIENT)
Dept: FAMILY MEDICINE | Facility: CLINIC | Age: 79
End: 2019-10-24

## 2019-10-24 RX ORDER — METOPROLOL SUCCINATE 50 MG/1
50 TABLET, EXTENDED RELEASE ORAL DAILY
Qty: 90 TABLET | Refills: 0 | Status: SHIPPED | OUTPATIENT
Start: 2019-10-24 | End: 2020-01-06

## 2019-10-24 NOTE — TELEPHONE ENCOUNTER
----- Message from Jerrica Godoy sent at 10/24/2019  9:15 AM CDT -----  Contact: pt  She's calling in regards to rx being called in      pls call pt back at  [phone] (home)

## 2019-10-24 NOTE — LETTER
October 25, 2019      [facility]  [address]  [place] [address]  Phone: [phone]  Fax: [phone]       Patient: Gloria Hale   YOB: 1940    Good Afternoon,     The following medication has been sent to your pharmacy:     metoprolol succinate (TOPROL-XL) 50 MG 24 hr tablet         Sig: Take 1 tablet (50 mg total) by mouth once daily.    Disp:  90 tablet    Refills:  0    Start: 10/24/2019 - 1/22/2020    Class: Normal    Authorized by: Joseluis Norman MD        To be filled at: [facility] [id number] [address]     This has been filled for one month. You are due for an office visit. Please contact the office so we can get you scheduled.     Walter Logan LPN

## 2019-10-24 NOTE — TELEPHONE ENCOUNTER
----- Message from Jerrica Godoy sent at 10/24/2019  9:15 AM CDT -----  Contact: pt  She's calling in regards to rx being called in      pls call pt back at  [phone] (home)

## 2019-10-31 ENCOUNTER — EXTERNAL CHRONIC CARE MANAGEMENT (OUTPATIENT)
Dept: PRIMARY CARE CLINIC | Facility: CLINIC | Age: 79
End: 2019-10-31
Payer: MEDICARE

## 2019-10-31 PROCEDURE — 99490 CHRNC CARE MGMT STAFF 1ST 20: CPT | Mod: S$PBB,,, | Performed by: FAMILY MEDICINE

## 2019-10-31 PROCEDURE — 99490 CHRNC CARE MGMT STAFF 1ST 20: CPT | Mod: PBBFAC,PO | Performed by: FAMILY MEDICINE

## 2019-10-31 PROCEDURE — 99490 PR CHRONIC CARE MGMT, 1ST 20 MIN: ICD-10-PCS | Mod: S$PBB,,, | Performed by: FAMILY MEDICINE

## 2019-11-20 ENCOUNTER — TELEPHONE (OUTPATIENT)
Dept: FAMILY MEDICINE | Facility: CLINIC | Age: 79
End: 2019-11-20

## 2019-11-20 DIAGNOSIS — Z12.31 VISIT FOR SCREENING MAMMOGRAM: Primary | ICD-10-CM

## 2019-11-20 NOTE — TELEPHONE ENCOUNTER
----- Message from Laquita Holliday sent at 11/20/2019 11:51 AM CST -----  Contact: pt  .Type:  Mammogram    Caller is requesting to schedule their annual mammogram appointment.  Order is not listed in EPIC.  Please enter order and contact patient to schedule.  Name of Caller: pt  Where would they like the mammogram performed? [place]   Would the patient rather a call back or a response via [facility]?  Call back   Best Call Back Number: [phone] (home)   Additional Information: pt request a call back to schedule a scan and cholesterol

## 2019-11-30 ENCOUNTER — EXTERNAL CHRONIC CARE MANAGEMENT (OUTPATIENT)
Dept: PRIMARY CARE CLINIC | Facility: CLINIC | Age: 79
End: 2019-11-30
Payer: MEDICARE

## 2019-11-30 PROCEDURE — 99490 CHRNC CARE MGMT STAFF 1ST 20: CPT | Mod: S$PBB,,, | Performed by: FAMILY MEDICINE

## 2019-11-30 PROCEDURE — 99490 PR CHRONIC CARE MGMT, 1ST 20 MIN: ICD-10-PCS | Mod: S$PBB,,, | Performed by: FAMILY MEDICINE

## 2019-11-30 PROCEDURE — 99490 CHRNC CARE MGMT STAFF 1ST 20: CPT | Mod: PBBFAC,PO | Performed by: FAMILY MEDICINE

## 2019-12-12 ENCOUNTER — OFFICE VISIT (OUTPATIENT)
Dept: FAMILY MEDICINE | Facility: CLINIC | Age: 79
End: 2019-12-12
Payer: MEDICARE

## 2019-12-12 ENCOUNTER — HOSPITAL ENCOUNTER (OUTPATIENT)
Dept: RADIOLOGY | Facility: HOSPITAL | Age: 79
Discharge: HOME | End: 2019-12-12
Attending: INTERNAL MEDICINE
Payer: MEDICARE

## 2019-12-12 VITALS
WEIGHT: 147 LBS | BODY MASS INDEX: 27.75 KG/M2 | DIASTOLIC BLOOD PRESSURE: 66 MMHG | HEIGHT: 61 IN | SYSTOLIC BLOOD PRESSURE: 143 MMHG | OXYGEN SATURATION: 97 % | TEMPERATURE: 98 F | HEART RATE: 67 BPM

## 2019-12-12 DIAGNOSIS — M54.42 CHRONIC LEFT-SIDED LOW BACK PAIN WITH LEFT-SIDED SCIATICA: Primary | ICD-10-CM

## 2019-12-12 DIAGNOSIS — N18.30 CKD (CHRONIC KIDNEY DISEASE) STAGE 3, GFR 30-59 ML/MIN: ICD-10-CM

## 2019-12-12 DIAGNOSIS — G89.29 CHRONIC LEFT-SIDED LOW BACK PAIN WITH LEFT-SIDED SCIATICA: Primary | ICD-10-CM

## 2019-12-12 DIAGNOSIS — G89.29 CHRONIC LEFT-SIDED LOW BACK PAIN WITH LEFT-SIDED SCIATICA: ICD-10-CM

## 2019-12-12 DIAGNOSIS — M54.50 LOW BACK PAIN, NON-SPECIFIC: ICD-10-CM

## 2019-12-12 DIAGNOSIS — J43.2 CENTRILOBULAR EMPHYSEMA: ICD-10-CM

## 2019-12-12 DIAGNOSIS — M54.42 CHRONIC LEFT-SIDED LOW BACK PAIN WITH LEFT-SIDED SCIATICA: ICD-10-CM

## 2019-12-12 PROCEDURE — 71046 X-RAY EXAM CHEST 2 VIEWS: CPT | Mod: TC,PO

## 2019-12-12 PROCEDURE — 1159F PR MEDICATION LIST DOCUMENTED IN MEDICAL RECORD: ICD-10-PCS | Mod: ,,, | Performed by: INTERNAL MEDICINE

## 2019-12-12 PROCEDURE — 99214 PR OFFICE/OUTPT VISIT, EST, LEVL IV, 30-39 MIN: ICD-10-PCS | Mod: S$PBB,,, | Performed by: INTERNAL MEDICINE

## 2019-12-12 PROCEDURE — 1126F AMNT PAIN NOTED NONE PRSNT: CPT | Mod: ,,, | Performed by: INTERNAL MEDICINE

## 2019-12-12 PROCEDURE — 93010 ELECTROCARDIOGRAM REPORT: CPT | Mod: S$PBB,,, | Performed by: INTERNAL MEDICINE

## 2019-12-12 PROCEDURE — 93010 EKG 12-LEAD: ICD-10-PCS | Mod: S$PBB,,, | Performed by: INTERNAL MEDICINE

## 2019-12-12 PROCEDURE — 93005 ELECTROCARDIOGRAM TRACING: CPT | Mod: PBBFAC,PO | Performed by: INTERNAL MEDICINE

## 2019-12-12 PROCEDURE — 99214 OFFICE O/P EST MOD 30 MIN: CPT | Mod: S$PBB,,, | Performed by: INTERNAL MEDICINE

## 2019-12-12 PROCEDURE — 71046 XR CHEST PA AND LATERAL: ICD-10-PCS | Mod: 26,,, | Performed by: RADIOLOGY

## 2019-12-12 PROCEDURE — 99999 PR PBB SHADOW E&M-EST. PATIENT-LVL V: ICD-10-PCS | Mod: PBBFAC,,, | Performed by: INTERNAL MEDICINE

## 2019-12-12 PROCEDURE — 99215 OFFICE O/P EST HI 40 MIN: CPT | Mod: PBBFAC,25,PO | Performed by: INTERNAL MEDICINE

## 2019-12-12 PROCEDURE — 99999 PR PBB SHADOW E&M-EST. PATIENT-LVL V: CPT | Mod: PBBFAC,,, | Performed by: INTERNAL MEDICINE

## 2019-12-12 PROCEDURE — 1159F MED LIST DOCD IN RCRD: CPT | Mod: ,,, | Performed by: INTERNAL MEDICINE

## 2019-12-12 PROCEDURE — 1126F PR PAIN SEVERITY QUANTIFIED, NO PAIN PRESENT: ICD-10-PCS | Mod: ,,, | Performed by: INTERNAL MEDICINE

## 2019-12-12 PROCEDURE — 71046 X-RAY EXAM CHEST 2 VIEWS: CPT | Mod: 26,,, | Performed by: RADIOLOGY

## 2019-12-12 RX ORDER — BUDESONIDE AND FORMOTEROL FUMARATE DIHYDRATE 160; 4.5 UG/1; UG/1
2 AEROSOL RESPIRATORY (INHALATION) EVERY 12 HOURS
Qty: 1 INHALER | Refills: 2 | Status: SHIPPED | OUTPATIENT
Start: 2019-12-12 | End: 2020-01-06 | Stop reason: SDUPTHER

## 2019-12-12 RX ORDER — BUDESONIDE AND FORMOTEROL FUMARATE DIHYDRATE 160; 4.5 UG/1; UG/1
2 AEROSOL RESPIRATORY (INHALATION) EVERY 12 HOURS
Qty: 1 INHALER | Refills: 2 | Status: SHIPPED | OUTPATIENT
Start: 2019-12-12 | End: 2019-12-12 | Stop reason: SDUPTHER

## 2019-12-12 NOTE — ASSESSMENT & PLAN NOTE
Hx of moderate COPD. Followed by pulmonology. Hx of non-compliance with controller inhalers. Does use PRN rescue inhaler. Recently had 2 episodes of shortness of breath recently but resolved now. O2 sats normal in clinic. Repeat CXR today. Patient agreeable to starting Symbicort so prescription provided. Patient to report to ED if she should experience severe shortness of breath or develops chest pain.

## 2019-12-12 NOTE — ASSESSMENT & PLAN NOTE
Hx of degenerative disc disease and received injections in the past. Patient interested in receiving injections again. Would like to go to same pain clinic that daughter uses. Referral placed

## 2019-12-12 NOTE — PROGRESS NOTES
Assessment/Plan:    Chronic left-sided low back pain with left-sided sciatica  Hx of degenerative disc disease and received injections in the past. Patient interested in receiving injections again. Would like to go to same pain clinic that daughter uses. Referral placed    Centrilobular emphysema  Hx of moderate COPD. Followed by pulmonology. Hx of non-compliance with controller inhalers. Does use PRN rescue inhaler. Recently had 2 episodes of shortness of breath recently but resolved now. O2 sats normal in clinic. Repeat CXR today. Patient agreeable to starting Symbicort so prescription provided. Patient to report to ED if she should experience severe shortness of breath or develops chest pain.    _____________________________________________________________________________________________________________________________________________________    Orders this visit:    Chronic left-sided low back pain with left-sided sciatica  -     Ambulatory referral to Pain Clinic  -     IN OFFICE EKG 12-LEAD (to Muse)  -     X-Ray Chest PA And Lateral; Future; Expected date: 12/12/2019    Low back pain, non-specific  -     MRI Lumbar Spine W WO Contrast; Future; Expected date: 12/12/2019    CKD (chronic kidney disease) stage 3, GFR 30-59 ml/min  -     Basic metabolic panel; Future; Expected date: 12/12/2019    Centrilobular emphysema  -     Discontinue: budesonide-formoterol 160-4.5 mcg (SYMBICORT) 160-4.5 mcg/actuation HFAA; Inhale 2 puffs into the lungs every 12 (twelve) hours. Controller  Dispense: 1 Inhaler; Refill: 2  -     budesonide-formoterol 160-4.5 mcg (SYMBICORT) 160-4.5 mcg/actuation HFAA; Inhale 2 puffs into the lungs every 12 (twelve) hours. Controller  Dispense: 1 Inhaler; Refill: 2      Follow up if symptoms worsen or fail to improve. Patient has appointment with PCP in several weeks    Shruthi Valderrama,  MD  _____________________________________________________________________________________________________________________________________________________    HPI:    Patient is in clinic today as an established patient with a new complaint of back and leg pain. Patient reports a chronic hx of low back pain. Now has worsened pain last night which has moved to L side of back and down her leg to her knee. Denies motor weakness or numbness/tingling of extremities. Denies LE swelling. Pain worse when standing for long periods of time. Pain improves with rest. Not taking anything for pain. Previously had injections of the back and is interested in having this done.  She is unsure of where she went to in the past she received the but would like to go to [facility].    Patient also reports an episodes of worsening shortness of breath yesterday and today. Patient feels like breathing is improved after she rests. Also using PRN inhaler. Denies chest pain.  Patient followed by pulmonology for history of COPD.  History of noncompliance with controller inhalers.  Does use her albuterol rescue inhaler as needed, along with nebulizer treatments as needed.    No other complaints today.    Past Medical History:  Past Medical History:   Diagnosis Date    Abdominal aortic aneurysm     Bile duct obstruction     per pt/ has seen MD    Cancer of upper lobe of right lung 2/26/2018    She had a resection of her right upper lobe due to a cancer at [facility] and she reports that the nodes were all negative.   She is not needing chemo or radiation.     Cataract     CKD (chronic kidney disease) stage 3, GFR 30-59 ml/min     Dr. Philippe    Colon polyp     hyperplastic 2007    COPD (chronic obstructive pulmonary disease)     COPD with acute exacerbation     The patient presents with COPD.  The patient denies chest pain, palpitations, shortness of breath, difficulty breathing, and wheezing.  The patient  feels that the pattern of symptoms is stable.  Current treatment has included albuterol inhaler.       Diplopia     DJD (degenerative joint disease), lumbar     HTN (hypertension)     Hyperlipidemia     Hypothyroidism     Lung cancer 2018    right    Obstruction of kidney     per pt/ pt does not know which kidney/ pt has appt to see nephrologist on 7/3    Osteopenia     Posterior vitreous detachment     Posterior vitreous detachment of right eye     Thyroiditis     positive thyroperoxidase antibodies    Trochanteric bursitis      Past Surgical History:   Procedure Laterality Date    CARPAL TUNNEL RELEASE      left     SECTION, CLASSIC      x2    ESOPHAGOGASTRODUODENOSCOPY Left 2019    Procedure: EGD (ESOPHAGOGASTRODUODENOSCOPY);  Surgeon: Nicola Her MD;  Location: [facility];  Service: Endoscopy;  Laterality: Left;    LUNG REMOVAL, PARTIAL Right     [facility]     Review of patient's allergies indicates:  No Known Allergies  Social History     Tobacco Use    Smoking status: Former Smoker     Packs/day: 1.00     Years: 45.00     Pack years: 45.00     Types: Cigarettes     Last attempt to quit: 2007     Years since quittin.9    Smokeless tobacco: Never Used   Substance Use Topics    Alcohol use: No    Drug use: No     Family History   Problem Relation Age of Onset    Coronary artery disease Mother     Diabetes Mother     Amblyopia Neg Hx     Blindness Neg Hx     Cancer Neg Hx     Cataracts Neg Hx     Glaucoma Neg Hx     Hypertension Neg Hx     Macular degeneration Neg Hx     Retinal detachment Neg Hx     Strabismus Neg Hx     Stroke Neg Hx     Thyroid disease Neg Hx      Current Outpatient Medications on File Prior to Visit   Medication Sig Dispense Refill    acetaminophen (TYLENOL) 325 MG tablet Take 2 tablets (650 mg total) by mouth every 4 (four) hours as needed.  0    albuterol (ACCUNEB) 0.63 mg/3 mL Nebu USE 1 VIAL VIA NEBULIZER EVERY SIX  HOURS AS NEEDED FOR WHEEZING OR SHORTNESS OF BREATH (Patient taking differently: Take 0.63 mg by nebulization 4 (four) times daily. ) 90 mL 0    calcium carbonate (OS-PAPITO) 500 mg calcium (1,250 mg) tablet Take 1 tablet by mouth once daily.       cetirizine (ZYRTEC) 10 MG tablet Take 10 mg by mouth daily as needed for Allergies.      clotrimazole-betamethasone 1-0.05% (LOTRISONE) cream apply topically twice daily as needed for itching      diltiaZEM (CARDIZEM CD) 240 MG 24 hr capsule TAKE ONE CAPSULE BY MOUTH TWICE DAILY 180 capsule 0    docusate sodium (COLACE) 100 MG capsule Take 1 capsule (100 mg total) by mouth 2 (two) times daily.  0    fish oil-omega-3 fatty acids 300-1,000 mg capsule Take 1 capsule by mouth once daily.       guaiFENesin (MUCINEX) 600 mg 12 hr tablet Take 1 tablet (600 mg total) by mouth 2 (two) times daily. 20 tablet 0    ipratropium (ATROVENT) 0.02 % nebulizer solution Take 500 mcg by nebulization 4 (four) times daily.       levothyroxine (SYNTHROID) 75 MCG tablet TAKE 1 TABLET BY MOUTH ONCE DAILY. 90 tablet 3    multivitamin (THERAGRAN) per tablet Take 1 tablet by mouth once daily.      rosuvastatin (CRESTOR) 40 MG Tab Take 1 tablet (40 mg total) by mouth once daily. 90 tablet 1    [DISCONTINUED] budesonide-formoterol 160-4.5 mcg (SYMBICORT) 160-4.5 mcg/actuation HFAA Inhale 2 puffs into the lungs every 12 (twelve) hours. Controller 1 Inhaler 2    metoprolol succinate (TOPROL-XL) 50 MG 24 hr tablet Take 1 tablet (50 mg total) by mouth once daily. (Patient not taking: Reported on 12/12/2019) 90 tablet 0    pantoprazole (PROTONIX) 40 MG tablet Take 1 tablet (40 mg total) by mouth once daily. (Patient not taking: Reported on 12/12/2019) 30 tablet 1    traMADol (ULTRAM) 50 mg tablet Take 1 tablet (50 mg total) by mouth every 6 (six) hours as needed for Pain. (Patient not taking: Reported on 12/12/2019) 30 tablet 0     No current facility-administered medications on file prior to  "visit.        Review of Systems   Constitutional: Negative for chills, diaphoresis, fatigue and fever.   HENT: Negative for congestion, ear pain, postnasal drip, sinus pain and sore throat.    Eyes: Negative for pain and redness.   Respiratory: Positive for shortness of breath. Negative for cough and chest tightness.    Cardiovascular: Negative for chest pain and leg swelling.   Gastrointestinal: Negative for abdominal pain, constipation, diarrhea, nausea and vomiting.   Genitourinary: Negative for dysuria and hematuria.   Musculoskeletal: Positive for back pain. Negative for arthralgias and joint swelling.   Skin: Negative for rash.   Neurological: Negative for dizziness, syncope, weakness, numbness and headaches.       Vitals:    12/12/19 1448   BP: (!) 143/66   Pulse: 67   Temp: 97.9 °F (36.6 °C)   SpO2: 97%   Weight: 66.7 kg (147 lb)   Height: 5' 1" (1.549 m)       Wt Readings from Last 3 Encounters:   12/12/19 66.7 kg (147 lb)   07/22/19 65.8 kg (145 lb 1 oz)   01/14/19 63.5 kg (139 lb 15.9 oz)       Physical Exam   Constitutional: She is oriented to person, place, and time. She appears well-developed and well-nourished. No distress.   HENT:   Head: Normocephalic and atraumatic.   Eyes: Conjunctivae and EOM are normal.   Neck: Normal range of motion. Neck supple.   Cardiovascular: Normal rate, regular rhythm, normal heart sounds and intact distal pulses.   No murmur heard.  Pulmonary/Chest: Effort normal and breath sounds normal. No respiratory distress.   Scattered rhonchi bilaterally.   Abdominal: She exhibits no distension.   Musculoskeletal: Normal range of motion. She exhibits no deformity.   Left lower back pain with extension of left hip.   Neurological: She is alert and oriented to person, place, and time. She displays normal reflexes. No sensory deficit. She exhibits normal muscle tone.   Skin: Skin is warm and dry. No rash noted.   Psychiatric: She has a normal mood and affect.       "

## 2019-12-13 ENCOUNTER — TELEPHONE (OUTPATIENT)
Dept: FAMILY MEDICINE | Facility: CLINIC | Age: 79
End: 2019-12-13

## 2019-12-13 NOTE — TELEPHONE ENCOUNTER
I do not recall any conversation about me prescribing her any medications for her heart. She needs to return to clinic to discuss this, preferably with either her PCP or his VINCENZO since they know her medical history best. I can see her as well if they have no availability

## 2019-12-13 NOTE — TELEPHONE ENCOUNTER
Pt said that she discussed getting back on her medicine for her heart rate during her visit yesterday. Pt was inquiring if you were going to start here back or not. Pt was advised that she needed to see PCP for a med f/u appt. Pt already has an appt scheduled for Jan to see Dr. Norman, but wants to start the meds now/pls advise.

## 2019-12-17 ENCOUNTER — TELEPHONE (OUTPATIENT)
Dept: FAMILY MEDICINE | Facility: CLINIC | Age: 79
End: 2019-12-17

## 2019-12-17 NOTE — TELEPHONE ENCOUNTER
"Attempted to call patient. No answer. Voicemail left. Emergency contact listed as daughter but called and was told that I had the wrong number.    Patient called last week requesting refill for her "heart medication" that she has not been on in a while. Unsure of what heart condition patient has. No cardiology note that I can find but maybe a mention of SVT in the past in other notes?    EKG in clinic last week read as new infarct. Patient denied chest pain and palpitations in clinic but was having episodes of shortness of breath. Given this unknown hx of cardiac disease, new symptoms of shortness of breath and a change in EKG, I believe patient would benefit from formal eval by cardiology.    Would like to find out if patient already has a cardiologist or if she is willing to be referred to someone.   "

## 2019-12-17 NOTE — TELEPHONE ENCOUNTER
Returned call to pt and she will discuss this with Dr Norman when she comes in for her appointment in January.

## 2019-12-17 NOTE — TELEPHONE ENCOUNTER
----- Message from Kayy Voss sent at 12/17/2019 10:27 AM CST -----  Contact: pt  Type:  Patient Returning Call    Who Called: the pt   Who Left Message for Patient: unknown  Does the patient know what this is regarding?: no  Would the patient rather a call back or a response via [facility]? Call back  Best Call Back Number: [phone]  Additional Information: n/a

## 2019-12-17 NOTE — TELEPHONE ENCOUNTER
----- Message from Theresa Lee sent at 12/17/2019 10:04 AM CST -----  Contact: pt  Type:  Patient Returning Call    Who Called:pt  Who Left Message for Patient:nurse  Does the patient know what this is regarding?:medicine  Would the patient rather a call back or a response via [facility]? Call back  Best Call Back Number:[phone]  Additional Information: .    Thank you

## 2019-12-17 NOTE — TELEPHONE ENCOUNTER
Please view other encounter where our office has attempted to contact patient regarding this times 3 days with no return call.

## 2019-12-31 ENCOUNTER — EXTERNAL CHRONIC CARE MANAGEMENT (OUTPATIENT)
Dept: PRIMARY CARE CLINIC | Facility: CLINIC | Age: 79
End: 2019-12-31
Payer: MEDICARE

## 2019-12-31 PROCEDURE — 99490 CHRNC CARE MGMT STAFF 1ST 20: CPT | Mod: PBBFAC,PO | Performed by: FAMILY MEDICINE

## 2019-12-31 PROCEDURE — 99490 PR CHRONIC CARE MGMT, 1ST 20 MIN: ICD-10-PCS | Mod: S$PBB,,, | Performed by: FAMILY MEDICINE

## 2019-12-31 PROCEDURE — 99490 CHRNC CARE MGMT STAFF 1ST 20: CPT | Mod: S$PBB,,, | Performed by: FAMILY MEDICINE

## 2020-01-06 ENCOUNTER — HOSPITAL ENCOUNTER (OUTPATIENT)
Dept: RADIOLOGY | Facility: HOSPITAL | Age: 80
Discharge: HOME | End: 2020-01-06
Attending: FAMILY MEDICINE
Payer: MEDICARE

## 2020-01-06 ENCOUNTER — OFFICE VISIT (OUTPATIENT)
Dept: FAMILY MEDICINE | Facility: CLINIC | Age: 80
End: 2020-01-06
Payer: MEDICARE

## 2020-01-06 VITALS — BODY MASS INDEX: 27.55 KG/M2 | WEIGHT: 145.94 LBS | HEIGHT: 61 IN

## 2020-01-06 VITALS
DIASTOLIC BLOOD PRESSURE: 83 MMHG | TEMPERATURE: 98 F | SYSTOLIC BLOOD PRESSURE: 136 MMHG | BODY MASS INDEX: 27.56 KG/M2 | HEIGHT: 61 IN | HEART RATE: 72 BPM | WEIGHT: 146 LBS

## 2020-01-06 DIAGNOSIS — I47.19 ECTOPIC ATRIAL TACHYCARDIA: ICD-10-CM

## 2020-01-06 DIAGNOSIS — J43.2 CENTRILOBULAR EMPHYSEMA: ICD-10-CM

## 2020-01-06 DIAGNOSIS — Z78.0 ASYMPTOMATIC AGE-RELATED POSTMENOPAUSAL STATE: ICD-10-CM

## 2020-01-06 DIAGNOSIS — E03.9 ACQUIRED HYPOTHYROIDISM: ICD-10-CM

## 2020-01-06 DIAGNOSIS — I10 ESSENTIAL HYPERTENSION: ICD-10-CM

## 2020-01-06 DIAGNOSIS — I71.40 ABDOMINAL AORTIC ANEURYSM (AAA) WITHOUT RUPTURE: Primary | ICD-10-CM

## 2020-01-06 DIAGNOSIS — Z12.31 BREAST CANCER SCREENING BY MAMMOGRAM: ICD-10-CM

## 2020-01-06 DIAGNOSIS — Z86.010 HISTORY OF COLON POLYPS: ICD-10-CM

## 2020-01-06 DIAGNOSIS — C34.11 MALIGNANT NEOPLASM OF UPPER LOBE OF RIGHT LUNG: ICD-10-CM

## 2020-01-06 DIAGNOSIS — R73.09 ELEVATED HEMOGLOBIN A1C: ICD-10-CM

## 2020-01-06 DIAGNOSIS — E78.5 DYSLIPIDEMIA: Chronic | ICD-10-CM

## 2020-01-06 DIAGNOSIS — Z12.31 VISIT FOR SCREENING MAMMOGRAM: ICD-10-CM

## 2020-01-06 DIAGNOSIS — N18.30 CKD (CHRONIC KIDNEY DISEASE) STAGE 3, GFR 30-59 ML/MIN: ICD-10-CM

## 2020-01-06 PROBLEM — C34.91 MALIGNANT NEOPLASM OF RIGHT LUNG: Status: ACTIVE | Noted: 2020-01-06

## 2020-01-06 PROBLEM — N17.9 AKI (ACUTE KIDNEY INJURY): Status: RESOLVED | Noted: 2018-03-26 | Resolved: 2020-01-06

## 2020-01-06 LAB
BACTERIA #/AREA URNS HPF: ABNORMAL /HPF
BILIRUB UR QL STRIP: NEGATIVE
CLARITY UR: CLEAR
COLOR UR: YELLOW
GLUCOSE UR QL STRIP: NEGATIVE
HGB UR QL STRIP: ABNORMAL
HYALINE CASTS #/AREA URNS LPF: 0 /LPF
KETONES UR QL STRIP: NEGATIVE
LEUKOCYTE ESTERASE UR QL STRIP: NEGATIVE
MICROSCOPIC COMMENT: ABNORMAL
NITRITE UR QL STRIP: NEGATIVE
PH UR STRIP: 6 [PH] (ref 5–8)
PROT UR QL STRIP: ABNORMAL
RBC #/AREA URNS HPF: 3 /HPF (ref 0–4)
SP GR UR STRIP: 1.02 (ref 1–1.03)
SQUAMOUS #/AREA URNS HPF: 8 /HPF
URN SPEC COLLECT METH UR: ABNORMAL
WBC #/AREA URNS HPF: 2 /HPF (ref 0–5)

## 2020-01-06 PROCEDURE — 77067 SCR MAMMO BI INCL CAD: CPT | Mod: TC,PO

## 2020-01-06 PROCEDURE — 77080 DEXA BONE DENSITY SPINE HIP: ICD-10-PCS | Mod: 26,,, | Performed by: RADIOLOGY

## 2020-01-06 PROCEDURE — 99214 PR OFFICE/OUTPT VISIT, EST, LEVL IV, 30-39 MIN: ICD-10-PCS | Mod: S$PBB,,, | Performed by: FAMILY MEDICINE

## 2020-01-06 PROCEDURE — 77080 DXA BONE DENSITY AXIAL: CPT | Mod: 26,,, | Performed by: RADIOLOGY

## 2020-01-06 PROCEDURE — 77067 SCR MAMMO BI INCL CAD: CPT | Mod: 26,,, | Performed by: RADIOLOGY

## 2020-01-06 PROCEDURE — 99999 PR PBB SHADOW E&M-EST. PATIENT-LVL IV: ICD-10-PCS | Mod: PBBFAC,,, | Performed by: FAMILY MEDICINE

## 2020-01-06 PROCEDURE — 1126F PR PAIN SEVERITY QUANTIFIED, NO PAIN PRESENT: ICD-10-PCS | Mod: ,,, | Performed by: FAMILY MEDICINE

## 2020-01-06 PROCEDURE — 90662 IIV NO PRSV INCREASED AG IM: CPT | Mod: PBBFAC,PO

## 2020-01-06 PROCEDURE — 1159F MED LIST DOCD IN RCRD: CPT | Mod: ,,, | Performed by: FAMILY MEDICINE

## 2020-01-06 PROCEDURE — 81000 URINALYSIS NONAUTO W/SCOPE: CPT | Mod: PO

## 2020-01-06 PROCEDURE — 1126F AMNT PAIN NOTED NONE PRSNT: CPT | Mod: ,,, | Performed by: FAMILY MEDICINE

## 2020-01-06 PROCEDURE — 77067 MAMMO DIGITAL SCREENING BILAT WITH TOMOSYNTHESIS_CAD: ICD-10-PCS | Mod: 26,,, | Performed by: RADIOLOGY

## 2020-01-06 PROCEDURE — 99214 OFFICE O/P EST MOD 30 MIN: CPT | Mod: PBBFAC,25,PO | Performed by: FAMILY MEDICINE

## 2020-01-06 PROCEDURE — 99999 PR PBB SHADOW E&M-EST. PATIENT-LVL IV: CPT | Mod: PBBFAC,,, | Performed by: FAMILY MEDICINE

## 2020-01-06 PROCEDURE — 77063 BREAST TOMOSYNTHESIS BI: CPT | Mod: 26,,, | Performed by: RADIOLOGY

## 2020-01-06 PROCEDURE — 77080 DXA BONE DENSITY AXIAL: CPT | Mod: TC,PO

## 2020-01-06 PROCEDURE — 77063 MAMMO DIGITAL SCREENING BILAT WITH TOMOSYNTHESIS_CAD: ICD-10-PCS | Mod: 26,,, | Performed by: RADIOLOGY

## 2020-01-06 PROCEDURE — 1159F PR MEDICATION LIST DOCUMENTED IN MEDICAL RECORD: ICD-10-PCS | Mod: ,,, | Performed by: FAMILY MEDICINE

## 2020-01-06 PROCEDURE — 99214 OFFICE O/P EST MOD 30 MIN: CPT | Mod: S$PBB,,, | Performed by: FAMILY MEDICINE

## 2020-01-06 RX ORDER — LEVOTHYROXINE SODIUM 75 UG/1
75 TABLET ORAL DAILY
Qty: 90 TABLET | Refills: 3 | Status: SHIPPED | OUTPATIENT
Start: 2020-01-06 | End: 2021-03-04

## 2020-01-06 RX ORDER — ALBUTEROL SULFATE 0.63 MG/3ML
0.63 SOLUTION RESPIRATORY (INHALATION) 4 TIMES DAILY
Qty: 360 VIAL | Refills: 3 | Status: SHIPPED | OUTPATIENT
Start: 2020-01-06

## 2020-01-06 RX ORDER — BUDESONIDE AND FORMOTEROL FUMARATE DIHYDRATE 160; 4.5 UG/1; UG/1
2 AEROSOL RESPIRATORY (INHALATION) EVERY 12 HOURS
Qty: 1 INHALER | Refills: 2 | Status: SHIPPED | OUTPATIENT
Start: 2020-01-06 | End: 2022-07-28

## 2020-01-06 RX ORDER — DILTIAZEM HYDROCHLORIDE 240 MG/1
240 CAPSULE, COATED, EXTENDED RELEASE ORAL 2 TIMES DAILY
Qty: 180 CAPSULE | Refills: 0 | Status: SHIPPED | OUTPATIENT
Start: 2020-01-06 | End: 2020-08-25

## 2020-01-06 RX ORDER — ROSUVASTATIN CALCIUM 40 MG/1
40 TABLET, COATED ORAL DAILY
Qty: 90 TABLET | Refills: 1 | Status: SHIPPED | OUTPATIENT
Start: 2020-01-06 | End: 2020-07-06

## 2020-01-06 NOTE — PROGRESS NOTES
The bone density test (DEXA scan) is showing osteoporosis.    -Start oscal 500+d taking 1 by mouth three times a day which is an over the counter medication-put it on the medcard.   -Start FOSAMAX 70 mg orally once a week with a glass of water and no lying down or eating for 30 minutes after.  Give #12 and refill x 3. Send in a prescription to the pharmacy for that medicine.    -Recheck the DEXA scan in 2 years.

## 2020-01-06 NOTE — PROGRESS NOTES
Subjective:      Patient ID: Gloria Hale is a 79 y.o. female.    Chief Complaint: Annual Exam    Problem List Items Addressed This Visit     Acquired hypothyroidism    Overview     The patient presents with hypothyroidism.  The patient denies agitation, anxiety, blurred vision, chest pain, cold intolerance, constipation, dizziness, dry skin, fatigue, lightheadedness, paresthesias, skin coarsening, tachycardia, tremor, weight gain or weight loss.  The patient's current treatment has included Synthroid with a good response.    Lab Results   Component Value Date    TSH 7.190 (H) 03/26/2018              Aortic aneurysm - Primary    Overview     She has had an abdominal aortic aneurysm.   She has had this tracked.     Narrative     Findings: Comparison 9 2115. Stable fusiform infrarenal abdominal aortic aneurysm measuring 2.9 x 2.7 cm in diameter 3.3 cm in length. Arterial sclerotic disease is again noted. Proximal aorta measured 2.2 x 2.5 cm. Mid aorta measured 1.9 x 1.7 cm. Both common iliac arteries are 10 millimeters in diameter.   Impression      Stable exam.      Electronically signed by: SAUNDRA TELLEZ MD  Date: 10/05/16  Time: 14:35               Centrilobular emphysema    Overview     The patient presents with COPD.  The patient denies chest pain, palpitations, shortness of breath, difficulty breathing, and wheezing.  The patient feels that the pattern of symptoms is stable.  Current treatment has included symbocirt and albuterol . She is also on mucinex and atrovent.             CKD (chronic kidney disease) stage 3, GFR 30-59 ml/min    Overview     Gloria Hale has an Estimated Glumerular Filtration Rate (EGFR) between 30 and 59 consistent with the definition of chronic kidney disease stage 3.    eGFR if non    Date Value Ref Range Status   12/12/2019 42.5 (A) >60 mL/min/1.73 m^2 Final     Comment:     Calculation used to obtain the estimated glomerular filtration  rate (eGFR) is the CKD-EPI  equation.            Lab Results   Component Value Date    CREATININE 1.2 12/12/2019    BUN 20 12/12/2019     12/12/2019    K 4.4 12/12/2019     12/12/2019    CO2 27 12/12/2019       The patient's chronic kidney disease stage 3 was monitored, evaluated, addressed and/or treated.             Dyslipidemia (Chronic)    Overview     The patient presents with hyperlipidemia.  The patient reports tolerating the medication well and is in excellent compliance.  There have been no medication side effects.  The patient denies chest pain, neuropathy, and myalgias.  The patient has reduced fat intake and has been exercising.  Current treatment has included the medications listed in the med card.    Lab Results   Component Value Date    CHOL 154 05/25/2018    CHOL 235 (H) 02/26/2018    CHOL 182 07/19/2017       Lab Results   Component Value Date    HDL 44 05/25/2018    HDL 67 02/26/2018    HDL 52 07/19/2017       Lab Results   Component Value Date    LDLCALC 71.4 05/25/2018    LDLCALC 135.2 02/26/2018    LDLCALC 101.2 07/19/2017       Lab Results   Component Value Date    TRIG 193 (H) 05/25/2018    TRIG 164 (H) 02/26/2018    TRIG 144 07/19/2017       Lab Results   Component Value Date    CHOLHDL 28.6 05/25/2018    CHOLHDL 28.5 02/26/2018    CHOLHDL 28.6 07/19/2017     Lab Results   Component Value Date    ALT 32 07/22/2019    AST 31 07/22/2019    ALKPHOS 90 07/22/2019    BILITOT 0.4 07/22/2019              Ectopic atrial tachycardia    Overview     Holter monitor - 24 hour   Order: [id number]   Status:  Final result Visible to patient:  No (Not Released) Next appt:  10/24/2016 at 01:20 PM in Cardiology (Siena Holliday NP) Dx:  Palpitations; SOB (shortness of breat...   Narrative   Date of Procedure: 10/13/2016    PRE-TEST DATA   The diary was not returned.        TEST DESCRIPTION   PREDOMINANT RHYTHM  1. Sinus rhythm with heart rates varying between 48 and 124 bpm with an average of 73 bpm.     VENTRICULAR  ARRHYTHMIAS  1. There were very rare polymorphic PVCs recorded totalling 2 and averaging less than 1 per hour.     2. There were no episodes of ventricular tachycardia.    SUPRA VENTRICULAR ARRHYTHMIAS  1. There were occasional PACs totalling 456 and averaging 19 per hour.     2. There were 3 non-sustained runs of EAT lasting from 3 to 4 beats.     SINUS NODE FUNCTION  1. There was no evidence of high grade SA misti block.     AV CONDUCTION  1. There was no evidence of high grade AV block.     DIARY  1. The diary was not returned    MISCELLANEOUS  1. This was a tape of adequate length (24 hrs).             This document has been electronically    SIGNED BY: Wolf Gonzales MD On: 10/17/2016 15:38                    History of colon polyps    Overview     The patient has had colon polyps in the past.  The next colonoscopy is due now.           HTN (hypertension)    Overview     The patient presents with essential hypertension.  The patient is tolerating the medication well and is in excellent compliance.  The patient is experiencing no side effects.  Counseling was offered regarding low salt diets.  The patient has a reduced salt intake.  The patient denies chest pain, palpitations, shortness of breath, dyspnea on exertion, left or murmur neck pain, nausea, vomiting, diaphoresis, paroxysmal nocturnal dyspnea, and orthopnea.   Hypertension Medications             diltiaZEM (CARDIZEM CD) 240 MG 24 hr capsule TAKE ONE CAPSULE BY MOUTH TWICE DAILY                 Malignant neoplasm of right lung    Overview       Lung nodule/ adenocarcinoma of the lung-biopsy proven adenocarcinoma and status post resection of right upper lobe in November 2017. This would be deemed a stage I cancer. She had a negative CT last year with Dr. daley and she plans to continue following up with him.                Past Medical History:  Past Medical History:   Diagnosis Date    Abdominal aortic aneurysm     Bile duct obstruction     per  pt/ has seen MD    Cancer of upper lobe of right lung 2018    She had a resection of her right upper lobe due to a cancer at [facility] and she reports that the nodes were all negative.   She is not needing chemo or radiation.     Cataract     CKD (chronic kidney disease) stage 3, GFR 30-59 ml/min     Dr. Philippe    Colon polyp     hyperplastic     COPD (chronic obstructive pulmonary disease)     COPD with acute exacerbation     The patient presents with COPD.  The patient denies chest pain, palpitations, shortness of breath, difficulty breathing, and wheezing.  The patient feels that the pattern of symptoms is stable.  Current treatment has included albuterol inhaler.       Diplopia     DJD (degenerative joint disease), lumbar     HTN (hypertension)     Hyperlipidemia     Hypothyroidism     Lung cancer     right    Obstruction of kidney     per pt/ pt does not know which kidney/ pt has appt to see nephrologist on 7/3    Osteopenia     Posterior vitreous detachment     Posterior vitreous detachment of right eye     Thyroiditis     positive thyroperoxidase antibodies    Trochanteric bursitis      Past Surgical History:   Procedure Laterality Date    CARPAL TUNNEL RELEASE      left     SECTION, CLASSIC      x2    ESOPHAGOGASTRODUODENOSCOPY Left 2019    Procedure: EGD (ESOPHAGOGASTRODUODENOSCOPY);  Surgeon: Nicola Her MD;  Location: [facility];  Service: Endoscopy;  Laterality: Left;    LUNG REMOVAL, PARTIAL Right     [facility]     Review of patient's allergies indicates:  No Known Allergies  Current Outpatient Medications on File Prior to Visit   Medication Sig Dispense Refill    acetaminophen (TYLENOL) 325 MG tablet Take 2 tablets (650 mg total) by mouth every 4 (four) hours as needed.  0    albuterol (ACCUNEB) 0.63 mg/3 mL Nebu USE 1 VIAL VIA NEBULIZER EVERY SIX HOURS AS NEEDED FOR WHEEZING OR SHORTNESS OF BREATH (Patient taking  differently: Take 0.63 mg by nebulization 4 (four) times daily. ) 90 mL 0    budesonide-formoterol 160-4.5 mcg (SYMBICORT) 160-4.5 mcg/actuation HFAA Inhale 2 puffs into the lungs every 12 (twelve) hours. Controller 1 Inhaler 2    calcium carbonate (OS-PAPITO) 500 mg calcium (1,250 mg) tablet Take 1 tablet by mouth once daily.       clotrimazole-betamethasone 1-0.05% (LOTRISONE) cream apply topically twice daily as needed for itching      diltiaZEM (CARDIZEM CD) 240 MG 24 hr capsule TAKE ONE CAPSULE BY MOUTH TWICE DAILY 180 capsule 0    docusate sodium (COLACE) 100 MG capsule Take 1 capsule (100 mg total) by mouth 2 (two) times daily.  0    fish oil-omega-3 fatty acids 300-1,000 mg capsule Take 1 capsule by mouth once daily.       guaiFENesin (MUCINEX) 600 mg 12 hr tablet Take 1 tablet (600 mg total) by mouth 2 (two) times daily. 20 tablet 0    ipratropium (ATROVENT) 0.02 % nebulizer solution Take 500 mcg by nebulization 4 (four) times daily.       levothyroxine (SYNTHROID) 75 MCG tablet TAKE 1 TABLET BY MOUTH ONCE DAILY. 90 tablet 3    multivitamin (THERAGRAN) per tablet Take 1 tablet by mouth once daily.      rosuvastatin (CRESTOR) 40 MG Tab Take 1 tablet (40 mg total) by mouth once daily. 90 tablet 1    [DISCONTINUED] cetirizine (ZYRTEC) 10 MG tablet Take 10 mg by mouth daily as needed for Allergies.      [DISCONTINUED] metoprolol succinate (TOPROL-XL) 50 MG 24 hr tablet Take 1 tablet (50 mg total) by mouth once daily. (Patient not taking: Reported on 12/12/2019) 90 tablet 0    [DISCONTINUED] pantoprazole (PROTONIX) 40 MG tablet Take 1 tablet (40 mg total) by mouth once daily. (Patient not taking: Reported on 12/12/2019) 30 tablet 1    [DISCONTINUED] traMADol (ULTRAM) 50 mg tablet Take 1 tablet (50 mg total) by mouth every 6 (six) hours as needed for Pain. (Patient not taking: Reported on 12/12/2019) 30 tablet 0     No current facility-administered medications on file prior to visit.      Social  History     Socioeconomic History    Marital status:      Spouse name: Ibrahima    Number of children: 2    Years of education: Not on file    Highest education level: Not on file   Occupational History    Not on file   Social Needs    Financial resource strain: Not on file    Food insecurity:     Worry: Not on file     Inability: Not on file    Transportation needs:     Medical: Not on file     Non-medical: Not on file   Tobacco Use    Smoking status: Former Smoker     Packs/day: 1.00     Years: 45.00     Pack years: 45.00     Types: Cigarettes     Last attempt to quit: 2007     Years since quittin.0    Smokeless tobacco: Never Used   Substance and Sexual Activity    Alcohol use: No    Drug use: No    Sexual activity: Never     Partners: Male   Lifestyle    Physical activity:     Days per week: Not on file     Minutes per session: Not on file    Stress: Not on file   Relationships    Social connections:     Talks on phone: Not on file     Gets together: Not on file     Attends Mandaen service: Not on file     Active member of club or organization: Not on file     Attends meetings of clubs or organizations: Not on file     Relationship status: Not on file   Other Topics Concern    Not on file   Social History Narrative    Lives alone     Family History   Problem Relation Age of Onset    Coronary artery disease Mother     Diabetes Mother     Amblyopia Neg Hx     Blindness Neg Hx     Cancer Neg Hx     Cataracts Neg Hx     Glaucoma Neg Hx     Hypertension Neg Hx     Macular degeneration Neg Hx     Retinal detachment Neg Hx     Strabismus Neg Hx     Stroke Neg Hx     Thyroid disease Neg Hx      She has had an increased a1c in the past and this needs to be tracked over time. She is not a diabetic at this point.   Lab Results   Component Value Date    HGBA1C 6.3 (H) 2019       Review of Systems   Constitutional: Negative for fatigue, fever and unexpected weight change.  "  HENT: Negative for congestion, ear pain, postnasal drip and sore throat.    Eyes: Negative for visual disturbance.   Respiratory: Negative for cough, chest tightness, shortness of breath and wheezing.    Cardiovascular: Negative for chest pain, palpitations and leg swelling.   Gastrointestinal: Negative for abdominal pain, blood in stool, constipation, diarrhea, nausea and vomiting.   Genitourinary: Negative for dysuria and hematuria.   Neurological: Negative for weakness and numbness.       Objective:     /83   Pulse 72   Temp 98 °F (36.7 °C) (Oral)   Ht 5' 1" (1.549 m)   Wt 66.2 kg (146 lb)   BMI 27.59 kg/m²     Physical Exam   Constitutional: She appears well-developed and well-nourished. She is cooperative.   HENT:   Head: Normocephalic and atraumatic.   Right Ear: Tympanic membrane, external ear and ear canal normal.   Left Ear: Tympanic membrane, external ear and ear canal normal.   Nose: Nose normal.   Mouth/Throat: Uvula is midline and mucous membranes are normal. No oral lesions. No oropharyngeal exudate, posterior oropharyngeal edema or posterior oropharyngeal erythema.   Eyes: Pupils are equal, round, and reactive to light. EOM and lids are normal. Right eye exhibits no discharge. Left eye exhibits no discharge. Right conjunctiva is not injected. Right conjunctiva has no hemorrhage. Left conjunctiva is not injected. Left conjunctiva has no hemorrhage. No scleral icterus. Right eye exhibits no nystagmus. Left eye exhibits no nystagmus.   Neck: Normal range of motion and full passive range of motion without pain. Neck supple. No JVD present. No tracheal tenderness present. Carotid bruit is not present. No tracheal deviation present. No thyroid mass and no thyromegaly present.   Cardiovascular: Normal rate, regular rhythm, S1 normal and S2 normal.   No murmur heard.  Pulses:       Carotid pulses are 2+ on the right side, and 2+ on the left side.       Radial pulses are 2+ on the right side, and " 2+ on the left side.        Posterior tibial pulses are 2+ on the right side, and 2+ on the left side.   Pulmonary/Chest: Effort normal. No respiratory distress. She has no wheezes. She has no rhonchi. She has no rales.   Abdominal: Soft. Normal appearance, normal aorta and bowel sounds are normal. She exhibits no distension, no abdominal bruit, no pulsatile midline mass and no mass. There is no hepatosplenomegaly. There is no tenderness. There is no rebound.   Musculoskeletal:        Right knee: She exhibits no swelling. No tenderness found.        Left knee: She exhibits no swelling. No tenderness found.   Lymphadenopathy:        Head (right side): No submental and no submandibular adenopathy present.        Head (left side): No submental and no submandibular adenopathy present.     She has no cervical adenopathy.   Neurological: She is alert. She has normal strength. No cranial nerve deficit or sensory deficit.   Skin: Skin is warm and dry. No rash noted. No cyanosis. Nails show no clubbing.   Psychiatric: She has a normal mood and affect. Her speech is normal and behavior is normal. Thought content normal. Cognition and memory are normal.     Assessment:     1. Abdominal aortic aneurysm (AAA) without rupture    2. Centrilobular emphysema    3. CKD (chronic kidney disease) stage 3, GFR 30-59 ml/min    4. Dyslipidemia    5. Essential hypertension    6. Acquired hypothyroidism    7. Ectopic atrial tachycardia    8. Malignant neoplasm of upper lobe of right lung    9. History of colon polyps    10. Asymptomatic age-related postmenopausal state    11. Elevated hemoglobin A1c        Plan:     Problem List Items Addressed This Visit     Acquired hypothyroidism    Relevant Medications    levothyroxine (SYNTHROID) 75 MCG tablet    Other Relevant Orders    TSH    Aortic aneurysm - Primary    Relevant Orders    US Abdominal Aorta    Centrilobular emphysema    Relevant Medications    budesonide-formoterol 160-4.5 mcg  (SYMBICORT) 160-4.5 mcg/actuation HFAA    albuterol (ACCUNEB) 0.63 mg/3 mL Nebu    CKD (chronic kidney disease) stage 3, GFR 30-59 ml/min    Dyslipidemia (Chronic)    Relevant Medications    rosuvastatin (CRESTOR) 40 MG Tab    Other Relevant Orders    Lipid panel    Ectopic atrial tachycardia    Relevant Medications    diltiaZEM (CARDIZEM CD) 240 MG 24 hr capsule    History of colon polyps    Relevant Orders    Case request GI: COLONOSCOPY (Completed)    HTN (hypertension)    Relevant Medications    diltiaZEM (CARDIZEM CD) 240 MG 24 hr capsule    Other Relevant Orders    Comprehensive metabolic panel    Urinalysis    Malignant neoplasm of right lung      Other Visit Diagnoses     Asymptomatic age-related postmenopausal state        Relevant Orders    DXA Bone Density Spine And Hip    Elevated hemoglobin A1c        Relevant Orders    Hemoglobin A1c        No follow-ups on file.      I am having Gloria Hale maintain her fish oil-omega-3 fatty acids, calcium carbonate, albuterol, guaiFENesin, clotrimazole-betamethasone 1-0.05%, levothyroxine, rosuvastatin, ipratropium, multivitamin, acetaminophen, docusate sodium, diltiaZEM, and budesonide-formoterol 160-4.5 mcg.    Gloria was seen today for annual exam.    Diagnoses and all orders for this visit:    Abdominal aortic aneurysm (AAA) without rupture  -     US Abdominal Aorta; Future    Centrilobular emphysema  -     budesonide-formoterol 160-4.5 mcg (SYMBICORT) 160-4.5 mcg/actuation HFAA; Inhale 2 puffs into the lungs every 12 (twelve) hours. Controller  -     albuterol (ACCUNEB) 0.63 mg/3 mL Nebu; Take 3 mLs (0.63 mg total) by nebulization 4 (four) times daily.    CKD (chronic kidney disease) stage 3, GFR 30-59 ml/min    Dyslipidemia  -     rosuvastatin (CRESTOR) 40 MG Tab; Take 1 tablet (40 mg total) by mouth once daily.  -     Lipid panel; Future    Essential hypertension  -     diltiaZEM (CARDIZEM CD) 240 MG 24 hr capsule; Take 1 capsule (240 mg total) by mouth 2  (two) times daily.  -     Comprehensive metabolic panel; Future  -     Urinalysis; Future    Acquired hypothyroidism  -     levothyroxine (SYNTHROID) 75 MCG tablet; Take 1 tablet (75 mcg total) by mouth once daily.  -     TSH; Future    Ectopic atrial tachycardia  -     diltiaZEM (CARDIZEM CD) 240 MG 24 hr capsule; Take 1 capsule (240 mg total) by mouth 2 (two) times daily.    Malignant neoplasm of upper lobe of right lung    History of colon polyps  -     Case request GI: COLONOSCOPY    Asymptomatic age-related postmenopausal state  -     DXA Bone Density Spine And Hip; Future    Elevated hemoglobin A1c  -     Hemoglobin A1c; Future    Other orders  -     Influenza - High Dose (65+) (PF) (IM)         The patient was instructed to stop the following meds:  Medications Discontinued During This Encounter   Medication Reason    traMADol (ULTRAM) 50 mg tablet Patient no longer taking    pantoprazole (PROTONIX) 40 MG tablet Patient no longer taking    metoprolol succinate (TOPROL-XL) 50 MG 24 hr tablet Patient no longer taking    cetirizine (ZYRTEC) 10 MG tablet Patient no longer taking    budesonide-formoterol 160-4.5 mcg (SYMBICORT) 160-4.5 mcg/actuation HFAA Reorder    levothyroxine (SYNTHROID) 75 MCG tablet Reorder    diltiaZEM (CARDIZEM CD) 240 MG 24 hr capsule Reorder    rosuvastatin (CRESTOR) 40 MG Tab Reorder    albuterol (ACCUNEB) 0.63 mg/3 mL Nebu Reorder     No orders of the defined types were placed in this encounter.

## 2020-01-08 ENCOUNTER — TELEPHONE (OUTPATIENT)
Dept: ENDOSCOPY | Facility: HOSPITAL | Age: 80
End: 2020-01-08

## 2020-01-08 ENCOUNTER — TELEPHONE (OUTPATIENT)
Dept: FAMILY MEDICINE | Facility: CLINIC | Age: 80
End: 2020-01-08

## 2020-01-08 DIAGNOSIS — R73.09 ELEVATED HEMOGLOBIN A1C: Primary | ICD-10-CM

## 2020-01-08 RX ORDER — POLYETHYLENE GLYCOL 3350, SODIUM SULFATE ANHYDROUS, SODIUM BICARBONATE, SODIUM CHLORIDE, POTASSIUM CHLORIDE 236; 22.74; 6.74; 5.86; 2.97 G/4L; G/4L; G/4L; G/4L; G/4L
4 POWDER, FOR SOLUTION ORAL ONCE
Qty: 4000 ML | Refills: 0 | Status: SHIPPED | OUTPATIENT
Start: 2020-01-08 | End: 2020-01-08

## 2020-01-08 NOTE — TELEPHONE ENCOUNTER
Endoscopy Scheduling Questionnaire:    1. Have you been admitted overnight to the hospital in the past 3 months? No  2. Have you had a cardiac stent placed in the past 12 months? no  3. Have you had a stroke or heart attack in the past 6 months? no  4. Have you had any chest pain in the past 3 months? no      If so, have you been evaluated by your PCP or Cardiologist? no  5. Do you take prescription weight loss medication? no  6. Have you been diagnosed with Diverticulitis within the past 3 months? no  7. Are you on dialysis? no  8. Are you diabetic? no Do you have an insulin pump? no  9. Do you have any other health issues that you feel might limit your ability to safely have the procedure and/or sedation? no  10. Is the patient over 79 yo? yes        If so, has the patient been seen by their PCP or GI in the last 6 months? yes       -I have reviewed the last colonoscopy for recommendations regarding surveillance and bowel prep  Yes  -I have reviewed the patient's medications and allergies. She is not on high risk medication, A clearance request NA  -I have verified the pharmacy information. The prep being used is [facility]. The patient's prep instructions were sent via mail..    Date Endoscopy Scheduled: Date: 2- heller

## 2020-01-08 NOTE — TELEPHONE ENCOUNTER
----- Message from Jyoti Calderon sent at 1/8/2020  8:53 AM CST -----  Contact: pt   Type:  Patient Returning Call    Who Called: Gloria Hale   Who Left Message for Patient: ROSELAI MELÉNDEZ  Does the patient know what this is regarding?:  Would the patient rather a call back or a response via [facility]? Call back   Best Call Back Number: [phone] (home)   Additional Information:

## 2020-01-09 ENCOUNTER — TELEPHONE (OUTPATIENT)
Dept: FAMILY MEDICINE | Facility: CLINIC | Age: 80
End: 2020-01-09

## 2020-01-09 NOTE — TELEPHONE ENCOUNTER
----- Message from Katina Bejarano sent at 1/9/2020  7:46 AM CST -----  Contact: pt   Type:  Patient Returning Call    Who Called:BABATUNDE AMIN   Who Left Message for Patient: nurse   Does the patient know what this is regarding?   Would the patient rather a call back or a response via [facility]?  Call   Best Call Back Number: [phone] (home)    Additional Information:

## 2020-01-13 ENCOUNTER — TELEPHONE (OUTPATIENT)
Dept: RADIOLOGY | Facility: HOSPITAL | Age: 80
End: 2020-01-13

## 2020-01-13 ENCOUNTER — TELEPHONE (OUTPATIENT)
Dept: FAMILY MEDICINE | Facility: CLINIC | Age: 80
End: 2020-01-13

## 2020-01-13 NOTE — TELEPHONE ENCOUNTER
----- Message from Geogrette Yi sent at 1/13/2020 11:21 AM CST -----  Contact: Pt   Pt called in regards to speaking with the staff in regards to medication  wants her to take. Pt can be reached at [phone] (home).

## 2020-01-22 ENCOUNTER — TELEPHONE (OUTPATIENT)
Dept: RADIOLOGY | Facility: HOSPITAL | Age: 80
End: 2020-01-22

## 2020-01-23 ENCOUNTER — HOSPITAL ENCOUNTER (OUTPATIENT)
Dept: RADIOLOGY | Facility: HOSPITAL | Age: 80
Discharge: HOME | End: 2020-01-23
Attending: FAMILY MEDICINE
Payer: MEDICARE

## 2020-01-23 DIAGNOSIS — I71.40 ABDOMINAL AORTIC ANEURYSM (AAA) WITHOUT RUPTURE: ICD-10-CM

## 2020-01-23 PROCEDURE — 76775 US ABDOMINAL AORTA: ICD-10-PCS | Mod: 26,,, | Performed by: RADIOLOGY

## 2020-01-23 PROCEDURE — 76775 US EXAM ABDO BACK WALL LIM: CPT | Mod: 26,,, | Performed by: RADIOLOGY

## 2020-01-23 PROCEDURE — 76775 US EXAM ABDO BACK WALL LIM: CPT | Mod: TC,PO

## 2020-01-31 ENCOUNTER — EXTERNAL CHRONIC CARE MANAGEMENT (OUTPATIENT)
Dept: PRIMARY CARE CLINIC | Facility: CLINIC | Age: 80
End: 2020-01-31
Payer: MEDICARE

## 2020-01-31 ENCOUNTER — TELEPHONE (OUTPATIENT)
Dept: FAMILY MEDICINE | Facility: CLINIC | Age: 80
End: 2020-01-31

## 2020-01-31 PROCEDURE — 99490 CHRNC CARE MGMT STAFF 1ST 20: CPT | Mod: S$PBB,,, | Performed by: FAMILY MEDICINE

## 2020-01-31 PROCEDURE — 99490 PR CHRONIC CARE MGMT, 1ST 20 MIN: ICD-10-PCS | Mod: S$PBB,,, | Performed by: FAMILY MEDICINE

## 2020-01-31 PROCEDURE — 99490 CHRNC CARE MGMT STAFF 1ST 20: CPT | Mod: PBBFAC,PO | Performed by: FAMILY MEDICINE

## 2020-01-31 RX ORDER — FLUTICASONE PROPIONATE 50 MCG
SPRAY, SUSPENSION (ML) NASAL
Qty: 16 G | Refills: 12 | Status: SHIPPED | OUTPATIENT
Start: 2020-01-31 | End: 2021-03-04

## 2020-01-31 NOTE — TELEPHONE ENCOUNTER
I filled the flonase.   The stool softener is not a prescription.  I did not send it in.     I have signed for the following orders AND/OR meds.  Please call the patient and ask the patient to schedule the testing AND/OR inform about any medications that were sent.      No orders of the defined types were placed in this encounter.      Medications Ordered This Encounter   Medications    fluticasone propionate (FLONASE) 50 mcg/actuation nasal spray     Sig: Use 2 puffs in each nostril q day.     Dispense:  16 g     Refill:  12

## 2020-02-29 ENCOUNTER — EXTERNAL CHRONIC CARE MANAGEMENT (OUTPATIENT)
Dept: PRIMARY CARE CLINIC | Facility: CLINIC | Age: 80
End: 2020-02-29
Payer: MEDICARE

## 2020-02-29 PROCEDURE — 99490 CHRNC CARE MGMT STAFF 1ST 20: CPT | Mod: S$PBB,,, | Performed by: FAMILY MEDICINE

## 2020-02-29 PROCEDURE — 99490 CHRNC CARE MGMT STAFF 1ST 20: CPT | Mod: PBBFAC,PO | Performed by: FAMILY MEDICINE

## 2020-02-29 PROCEDURE — 99490 PR CHRONIC CARE MGMT, 1ST 20 MIN: ICD-10-PCS | Mod: S$PBB,,, | Performed by: FAMILY MEDICINE

## 2020-03-31 ENCOUNTER — EXTERNAL CHRONIC CARE MANAGEMENT (OUTPATIENT)
Dept: PRIMARY CARE CLINIC | Facility: CLINIC | Age: 80
End: 2020-03-31
Payer: MEDICARE

## 2020-03-31 PROCEDURE — 99490 CHRNC CARE MGMT STAFF 1ST 20: CPT | Mod: S$PBB,,, | Performed by: FAMILY MEDICINE

## 2020-03-31 PROCEDURE — 99490 CHRNC CARE MGMT STAFF 1ST 20: CPT | Mod: PBBFAC,PO | Performed by: FAMILY MEDICINE

## 2020-03-31 PROCEDURE — 99490 PR CHRONIC CARE MGMT, 1ST 20 MIN: ICD-10-PCS | Mod: S$PBB,,, | Performed by: FAMILY MEDICINE

## 2020-04-30 ENCOUNTER — EXTERNAL CHRONIC CARE MANAGEMENT (OUTPATIENT)
Dept: PRIMARY CARE CLINIC | Facility: CLINIC | Age: 80
End: 2020-04-30
Payer: MEDICARE

## 2020-04-30 PROCEDURE — 99490 PR CHRONIC CARE MGMT, 1ST 20 MIN: ICD-10-PCS | Mod: S$PBB,,, | Performed by: FAMILY MEDICINE

## 2020-04-30 PROCEDURE — 99490 CHRNC CARE MGMT STAFF 1ST 20: CPT | Mod: S$PBB,,, | Performed by: FAMILY MEDICINE

## 2020-04-30 PROCEDURE — 99490 CHRNC CARE MGMT STAFF 1ST 20: CPT | Mod: PBBFAC,PO | Performed by: FAMILY MEDICINE

## 2020-05-04 ENCOUNTER — OFFICE VISIT (OUTPATIENT)
Dept: FAMILY MEDICINE | Facility: CLINIC | Age: 80
End: 2020-05-04
Payer: MEDICARE

## 2020-05-04 DIAGNOSIS — J20.9 BRONCHITIS WITH BRONCHOSPASM: Primary | ICD-10-CM

## 2020-05-04 DIAGNOSIS — K59.00 CONSTIPATION, UNSPECIFIED CONSTIPATION TYPE: ICD-10-CM

## 2020-05-04 PROCEDURE — 99442 PR PHYSICIAN TELEPHONE EVALUATION 11-20 MIN: CPT | Mod: 95,,, | Performed by: FAMILY MEDICINE

## 2020-05-04 PROCEDURE — 99442 PR PHYSICIAN TELEPHONE EVALUATION 11-20 MIN: ICD-10-PCS | Mod: 95,,, | Performed by: FAMILY MEDICINE

## 2020-05-04 RX ORDER — LEVOCETIRIZINE DIHYDROCHLORIDE 5 MG/1
5 TABLET, FILM COATED ORAL NIGHTLY
Qty: 30 TABLET | Refills: 11 | Status: SHIPPED | OUTPATIENT
Start: 2020-05-04 | End: 2021-08-08

## 2020-05-04 RX ORDER — LACTULOSE 10 G/15ML
20 SOLUTION ORAL 3 TIMES DAILY
Qty: 900 ML | Refills: 11 | Status: SHIPPED | OUTPATIENT
Start: 2020-05-04 | End: 2021-06-21

## 2020-05-04 RX ORDER — METHYLPREDNISOLONE 4 MG/1
TABLET ORAL
Qty: 21 TABLET | Refills: 0 | Status: SHIPPED | OUTPATIENT
Start: 2020-05-04 | End: 2020-09-17 | Stop reason: SDUPTHER

## 2020-05-04 NOTE — PROGRESS NOTES
Established Patient - Audio Only Telehealth Visit     The patient location is: home  The chief complaint leading to consultation is: cough and congestion  Visit type: Virtual visit with audio only (telephone)  Total time spent with patient: 12 min       The reason for the audio only service rather than synchronous audio and video virtual visit was related to technical difficulties or patient preference/necessity.     Each patient to whom I provide medical services by telemedicine is:  (1) informed of the relationship between the physician and patient and the respective role of any other health care provider with respect to management of the patient; and (2) notified that they may decline to receive medical services by telemedicine and may withdraw from such care at any time. Patient verbally consented to receive this service via voice-only telephone call.       HPI: Cough: Patient complains of productive cough.  Symptoms began 1 week ago.  The cough is non-productive, without wheezing, dyspnea or hemoptysis and is aggravated by nothing Associated symptoms include:shortness of breath and wheezing. Patient does not have a history of asthma. Patient does not have a history of environmental allergens. Patient has not recent travel. Patient does have a history of smoking. Patient  had previous Chest X-ray. She does not smoke now. She has had a partial lobectomy.     She was seen at [facility] a week ago.     Below are notes.       Diagnostic Results for last 36Hrs:  Xr Chest Ap Portable    Result Date: 4/24/2020  REASON FOR EXAM: sob TECHNICAL FACTORS: One view. COMPARISON: December 27, 2018 FINDINGS: There is volume loss in the right hemithorax status post right upper lobectomy. Lungs are otherwise clear. The cardiac silhouette is not enlarged. Upper mediastinal contents are slightly shifted rightward. Pulmonary vasculature is within normal limits. There is subtle upward retraction of the pleural surface adjacent to the  right hemidiaphragm. There is no effusion or pneumothorax. Osseous structures are unremarkable.     No acute findings. Chronic changes as above. Electronically signed by Duy Yoder MD on 4/24/2020 8:33 PM     Wet Read Results   XR Chest AP Portable   Final Result     No acute findings. Chronic changes as above.       Electronically signed by Duy Yoder MD on 4/24/2020 8:33 PM         Medications   methylPREDNISolone sod suc(PF) (SOLU-Medrol) 125 mg/2 mL injection 125 mg (125 mg Intravenous $Given 4/24/20 1945)   ipratropium-albuteroL (DUONEB) 0.5 mg-3 mg(2.5 mg base)/3 mL nebulizer solution 3 mL (3 mLs Nebulization $Given 4/24/20 2012)     Procedures        MDM  Number of Diagnoses or Management Options  COPD exacerbation (HCC): new and requires workup  Hypernatremia: new and requires workup  Diagnosis management comments: Gloria Hale is a 79 y.o. female presenting shortness of breath most consistent with COPD exacerbation. No fevers, no change in cough, no other coronary symptoms, no indication of covid19. No chest pain, no indication of ACS but we'll do screening lab work including troponin to assess for ACS.    I personally reviewed the patient's medical records which show no recent visits or admissions    A pulse oximetry was measured and was recorded as (99 %-100 %) which is not hypoxic.    History provided by patient as well as EMS at bedside    I independently interpreted the patient's lab results which showed mild hypernatremia but no other significant abnormalities. D-dimer 600 but this is well below the age ingested threshold of 800.     I independently interpreted the patient's ECG which showed sinus tach, rate 105, left axis, no hypertrophy, nl ST segments, nl T-waves,     I have personally visualized and interpreted the patient's CXR and determined that it demonstrates no acute abnormality.     Reassess the patient she felt much improved after Solu-Medrol and breathing treatments. Os and  the patient, improved air movement, decreased wheezes. Told her that since she is 79 and has underlying medical problems, we can certainly admit the patient for serial breathing treatments and steroids but patient would prefer to go home. We took her off of the oxygen and her saturation remained 96% or greater with no increased work of breathing. I told her to return if her symptoms change, worsen, fail to improve, or she decides that she would like to be admitted. We will treat her with prednisone as well as azithromycin.      Amount and/or Complexity of Data Reviewed  Clinical lab tests: ordered and reviewed  Tests in the radiology section of CPT®: ordered and reviewed  Tests in the medicine section of CPT®: ordered and reviewed  Independent visualization of images, tracings, or specimens: yes      Medication List     START taking these medications   predniSONE 50 MG tablet  Commonly known as: DELTASONE  Take 1 tablet (50 mg total) by mouth daily for 5 days      CONTINUE taking these medications   azithromycin 250 MG tablet  Commonly known as: Zithromax Z-Chavo  Take two 250mg tablets PO on day 1, then one 250mg tablet PO daily for 4 days.      ASK your doctor about these medications   * albuterol 90 mcg/actuation inhaler  Commonly known as: Ventolin HFA  Inhale 2 puffs into the lungs every 6 (six) hours as needed for Wheezing.    * albuterol 1.25 mg/3 mL nebulizer solution  Commonly known as: ACCUNEB  Take 3 mLs (1.25 mg total) by nebulization every 6 (six) hours as needed for Wheezing or Shortness of Breath.    calcium carbonate 500 mg calcium (1,250 mg) tablet  Commonly known as: OS-PAPITO    clotrimazole-betamethasone 1-0.05 % topical cream  Commonly known as: LOTRISONE    diltiaZEM 240 MG 24 hr capsule  Commonly known as: CARDIZEM CD    fluticasone propionate 50 mcg/actuation nasal spray  Commonly known as: FLONASE    guaiFENesin 600 mg 12 hr tablet  Commonly known as: MUCINEX    * ipratropium 0.02 % nebulizer  solution  Commonly known as: ATROVENT    * ipratropium 0.02 % nebulizer solution  Commonly known as: ATROVENT  Take 2.5 mLs (0.5 mg total) by nebulization every 6 (six) hours as needed for Wheezing    levothyroxine 75 MCG tablet  Commonly known as: SYNTHROID    methylPREDNISolone 4 mg tablet  Commonly known as: MEDROL DOSEPACK  follow package directions    metoprolol succinate 50 MG 24 hr tablet  Commonly known as: TOPROL-XL    omega-3 fatty acids-fish oil 300-1,000 mg Cap    rosuvastatin 40 MG tablet  Commonly known as: CRESTOR      * This list has 4 medication(s) that are the same as other medications prescribed for you. Read the directions carefully, and ask your doctor or other care provider to review them with you.           Where to Get Your Medications     You can get these medications from any pharmacy   Bring a paper prescription for each of these medications  · azithromycin 250 MG tablet  · predniSONE 50 MG tablet      ED Critical Care Time    Heart Score    Diagnosis:    Final diagnoses:   COPD exacerbation (HCC)   Hypernatremia       Hermes Knight MD  04/24/20 2121      Hermes Knight MD  04/24/20 2122       she also is having problems with constipation and she would like something for this to take daily.     Assessment and plan:  Diagnoses and all orders for this visit:    Bronchitis with bronchospasm  -     methylPREDNISolone (MEDROL DOSEPACK) 4 mg tablet; follow package directions  -     levocetirizine (XYZAL) 5 MG tablet; Take 1 tablet (5 mg total) by mouth every evening.    Constipation, unspecified constipation type  -     lactulose (CHRONULAC) 20 gram/30 mL Soln; Take 30 mLs (20 g total) by mouth 3 (three) times daily. May dose lower as needed by symptoms and results. for 10 days      Come to the office if she worsens.  She states that she is better than when she was in the Er.                        This service was not originating from a related E/M service provided within the previous 7  days nor will  to an E/M service or procedure within the next 24 hours or my soonest available appointment.  Prevailing standard of care was able to be met in this audio-only visit.

## 2020-05-29 ENCOUNTER — TELEPHONE (OUTPATIENT)
Dept: FAMILY MEDICINE | Facility: CLINIC | Age: 80
End: 2020-05-29

## 2020-05-29 RX ORDER — CLOBETASOL PROPIONATE 0.5 MG/G
CREAM TOPICAL 2 TIMES DAILY
Qty: 45 G | Refills: 0 | Status: SHIPPED | OUTPATIENT
Start: 2020-05-29 | End: 2021-08-08

## 2020-05-31 ENCOUNTER — EXTERNAL CHRONIC CARE MANAGEMENT (OUTPATIENT)
Dept: PRIMARY CARE CLINIC | Facility: CLINIC | Age: 80
End: 2020-05-31
Payer: MEDICARE

## 2020-05-31 PROCEDURE — 99490 CHRNC CARE MGMT STAFF 1ST 20: CPT | Mod: S$PBB,,, | Performed by: FAMILY MEDICINE

## 2020-05-31 PROCEDURE — 99490 PR CHRONIC CARE MGMT, 1ST 20 MIN: ICD-10-PCS | Mod: S$PBB,,, | Performed by: FAMILY MEDICINE

## 2020-05-31 PROCEDURE — 99490 CHRNC CARE MGMT STAFF 1ST 20: CPT | Mod: PBBFAC,PO | Performed by: FAMILY MEDICINE

## 2020-06-30 ENCOUNTER — EXTERNAL CHRONIC CARE MANAGEMENT (OUTPATIENT)
Dept: PRIMARY CARE CLINIC | Facility: CLINIC | Age: 80
End: 2020-06-30
Payer: MEDICARE

## 2020-06-30 PROCEDURE — 99490 CHRNC CARE MGMT STAFF 1ST 20: CPT | Mod: S$PBB,,, | Performed by: FAMILY MEDICINE

## 2020-06-30 PROCEDURE — 99490 PR CHRONIC CARE MGMT, 1ST 20 MIN: ICD-10-PCS | Mod: S$PBB,,, | Performed by: FAMILY MEDICINE

## 2020-06-30 PROCEDURE — 99490 CHRNC CARE MGMT STAFF 1ST 20: CPT | Mod: PBBFAC,PO | Performed by: FAMILY MEDICINE

## 2020-07-06 ENCOUNTER — TELEPHONE (OUTPATIENT)
Dept: FAMILY MEDICINE | Facility: CLINIC | Age: 80
End: 2020-07-06

## 2020-07-06 ENCOUNTER — OFFICE VISIT (OUTPATIENT)
Dept: FAMILY MEDICINE | Facility: CLINIC | Age: 80
End: 2020-07-06
Payer: MEDICARE

## 2020-07-06 DIAGNOSIS — J06.9 UPPER RESPIRATORY TRACT INFECTION, UNSPECIFIED TYPE: Primary | ICD-10-CM

## 2020-07-06 PROCEDURE — 99441 PR PHYSICIAN TELEPHONE EVALUATION 5-10 MIN: ICD-10-PCS | Mod: 95,,, | Performed by: FAMILY MEDICINE

## 2020-07-06 PROCEDURE — 99441 PR PHYSICIAN TELEPHONE EVALUATION 5-10 MIN: CPT | Mod: 95,,, | Performed by: FAMILY MEDICINE

## 2020-07-06 RX ORDER — METHYLPREDNISOLONE 4 MG/1
TABLET ORAL
Qty: 1 PACKAGE | Refills: 0 | Status: SHIPPED | OUTPATIENT
Start: 2020-07-06 | End: 2020-07-27

## 2020-07-06 NOTE — TELEPHONE ENCOUNTER
----- Message from Reina Matt sent at 7/6/2020  8:41 AM CDT -----  Pt is calling regarding scheduling na phone visit due to cough// no energy. Would like an prescription. Please call back at [phone]

## 2020-07-06 NOTE — TELEPHONE ENCOUNTER
----- Message from Ayesha Dasilva sent at 7/6/2020  8:48 AM CDT -----  Regarding: requesting Antibiotics  Contact: Pt  Pt is calling the staff regarding a Virtual visit.  Pt stated that the pt HAS congestion , weak, and coughing  AND no fever    Pt call back today [phone]    tHANKS

## 2020-07-06 NOTE — PROGRESS NOTES
Gloria CHEIKH Hale presents with moderate upper respiratory congestion,rhinnorhea,moderate cough past 2-3 days. OTC help slightly. Denies nausea,vomiting,diarrhea or significant fever.    Past Medical History:   Diagnosis Date    Abdominal aortic aneurysm     Bile duct obstruction     per pt/ has seen MD    Cancer of upper lobe of right lung 2018    She had a resection of her right upper lobe due to a cancer at [facility] and she reports that the nodes were all negative.   She is not needing chemo or radiation.     Cataract     CKD (chronic kidney disease) stage 3, GFR 30-59 ml/min     Dr. Philippe    Colon polyp     hyperplastic     COPD (chronic obstructive pulmonary disease)     COPD with acute exacerbation     The patient presents with COPD.  The patient denies chest pain, palpitations, shortness of breath, difficulty breathing, and wheezing.  The patient feels that the pattern of symptoms is stable.  Current treatment has included albuterol inhaler.       Diplopia     DJD (degenerative joint disease), lumbar     HTN (hypertension)     Hyperlipidemia     Hypothyroidism     Lung cancer     right    Obstruction of kidney     per pt/ pt does not know which kidney/ pt has appt to see nephrologist on 7/3    Osteopenia     Posterior vitreous detachment     Posterior vitreous detachment of right eye     Thyroiditis     positive thyroperoxidase antibodies    Trochanteric bursitis      Past Surgical History:   Procedure Laterality Date    CARPAL TUNNEL RELEASE      left     SECTION, CLASSIC      x2    ESOPHAGOGASTRODUODENOSCOPY Left 2019    Procedure: EGD (ESOPHAGOGASTRODUODENOSCOPY);  Surgeon: Nicola Her MD;  [facility];  Service: Endoscopy;  Laterality: Left;    LUNG REMOVAL, PARTIAL Right     [facility]     Review of patient's allergies indicates:  No Known Allergies  Current Outpatient Medications on File Prior to Visit   Medication  Sig Dispense Refill    acetaminophen (TYLENOL) 325 MG tablet Take 2 tablets (650 mg total) by mouth every 4 (four) hours as needed.  0    albuterol (ACCUNEB) 0.63 mg/3 mL Nebu Take 3 mLs (0.63 mg total) by nebulization 4 (four) times daily. 360 vial 3    budesonide-formoterol 160-4.5 mcg (SYMBICORT) 160-4.5 mcg/actuation HFAA Inhale 2 puffs into the lungs every 12 (twelve) hours. Controller 1 Inhaler 2    calcium carbonate (OS-PAPITO) 500 mg calcium (1,250 mg) tablet Take 1 tablet by mouth once daily.       clobetasoL (TEMOVATE) 0.05 % cream Apply topically 2 (two) times daily. for 10 days 45 g 0    clotrimazole-betamethasone 1-0.05% (LOTRISONE) cream apply topically twice daily as needed for itching      diltiaZEM (CARDIZEM CD) 240 MG 24 hr capsule Take 1 capsule (240 mg total) by mouth 2 (two) times daily. 180 capsule 0    docusate sodium (COLACE) 100 MG capsule Take 1 capsule (100 mg total) by mouth 2 (two) times daily.  0    fish oil-omega-3 fatty acids 300-1,000 mg capsule Take 1 capsule by mouth once daily.       fluticasone propionate (FLONASE) 50 mcg/actuation nasal spray Use 2 puffs in each nostril q day. 16 g 12    guaiFENesin (MUCINEX) 600 mg 12 hr tablet Take 1 tablet (600 mg total) by mouth 2 (two) times daily. 20 tablet 0    ipratropium (ATROVENT) 0.02 % nebulizer solution Take 500 mcg by nebulization 4 (four) times daily.       levocetirizine (XYZAL) 5 MG tablet Take 1 tablet (5 mg total) by mouth every evening. 30 tablet 11    levothyroxine (SYNTHROID) 75 MCG tablet Take 1 tablet (75 mcg total) by mouth once daily. 90 tablet 3    methylPREDNISolone (MEDROL DOSEPACK) 4 mg tablet follow package directions 21 tablet 0    multivitamin (THERAGRAN) per tablet Take 1 tablet by mouth once daily.      rosuvastatin (CRESTOR) 40 MG Tab Take 1 tablet (40 mg total) by mouth once daily. 90 tablet 1     No current facility-administered medications on file prior to visit.      Social History      Socioeconomic History    Marital status:      Spouse name: Ibrahima    Number of children: 2    Years of education: Not on file    Highest education level: Not on file   Occupational History    Not on file   Social Needs    Financial resource strain: Not on file    Food insecurity     Worry: Not on file     Inability: Not on file    Transportation needs     Medical: Not on file     Non-medical: Not on file   Tobacco Use    Smoking status: Former Smoker     Packs/day: 1.00     Years: 45.00     Pack years: 45.00     Types: Cigarettes     Quit date: 2007     Years since quittin.5    Smokeless tobacco: Never Used   Substance and Sexual Activity    Alcohol use: No    Drug use: No    Sexual activity: Never     Partners: Male   Lifestyle    Physical activity     Days per week: Not on file     Minutes per session: Not on file    Stress: Not on file   Relationships    Social connections     Talks on phone: Not on file     Gets together: Not on file     Attends [employer] service: Not on file     Active member of club or organization: Not on file     Attends meetings of clubs or organizations: Not on file     Relationship status: Not on file   Other Topics Concern    Not on file   Social History Narrative    Lives alone     Family History   Problem Relation Age of Onset    Coronary artery disease Mother     Diabetes Mother     Amblyopia Neg Hx     Blindness Neg Hx     Cancer Neg Hx     Cataracts Neg Hx     Glaucoma Neg Hx     Hypertension Neg Hx     Macular degeneration Neg Hx     Retinal detachment Neg Hx     Strabismus Neg Hx     Stroke Neg Hx     Thyroid disease Neg Hx          ROS:  SKIN: No rashes, itching or changes in color or texture of skin.  EYES: Visual acuity fine. No photophobia, ocular pain or diplopia.EARS: Denies ear pain, discharge or vertigo.NOSE: No loss of smell, no epistaxis some postnasal drip.MOUTH & THROAT: No hoarseness or change in voice. No excessive  gum bleeding.CHEST: Denies DOMINGUEZ, cyanosis, wheezing  CARDIOVASCULAR: Denies chest pain, PND, orthopnea or reduced exercise tolerance.  ABDOMEN:  No weight loss.No abdominal pain, no hematemesis or blood in stool.  URINARY: No flank pain, dysuria or hematuria.  PERIPHERAL VASCULAR: No claudication or cyanosis.  MUSCULOSKELETAL: Negative   NEUROLOGIC: No history of seizures, paralysis, alteration of gait or coordination.    PE: Vital signs as noted  Heent:Normocephalic with no recent cranial trauma,PERRLA,EOMI,conjunctiva clear,fundi reveal no hemmorhage exudate or papilledema.Otic canals clear, tympanic membranes slightly dull bilaterally.Nasal mucosa slightly red and edematous.Posterior pharynx slightly red but without exudate.  Neck:Supple with minimal anterior cervical adenopathy.  Chest:Clear bilateral breath sounds with mild scattered ronchi  Heart:Regular rhthym without murmer  Abdomen:Soft, non tender,no masses, no hepatosplenomegalyExtremeties and Neurologic:Grossly within normal limits  Impression: Upper Respiratory Infection. 465.9  Plan:Medrol dspk and fu if worsening

## 2020-07-30 ENCOUNTER — TELEPHONE (OUTPATIENT)
Dept: FAMILY MEDICINE | Facility: CLINIC | Age: 80
End: 2020-07-30

## 2020-07-30 ENCOUNTER — LAB VISIT (OUTPATIENT)
Dept: LAB | Facility: HOSPITAL | Age: 80
End: 2020-07-30
Attending: FAMILY MEDICINE
Payer: MEDICARE

## 2020-07-30 DIAGNOSIS — R30.0 DYSURIA: ICD-10-CM

## 2020-07-30 DIAGNOSIS — R30.0 DYSURIA: Primary | ICD-10-CM

## 2020-07-30 LAB
BILIRUB UR QL STRIP: NEGATIVE
CLARITY UR REFRACT.AUTO: CLEAR
COLOR UR AUTO: NORMAL
GLUCOSE UR QL STRIP: NEGATIVE
HGB UR QL STRIP: NEGATIVE
KETONES UR QL STRIP: NEGATIVE
LEUKOCYTE ESTERASE UR QL STRIP: NEGATIVE
NITRITE UR QL STRIP: NEGATIVE
PH UR STRIP: 6 [PH] (ref 5–8)
PROT UR QL STRIP: NEGATIVE
SP GR UR STRIP: 1 (ref 1–1.03)
URN SPEC COLLECT METH UR: NORMAL

## 2020-07-30 PROCEDURE — 81003 URINALYSIS AUTO W/O SCOPE: CPT

## 2020-07-30 NOTE — TELEPHONE ENCOUNTER
I have signed for the following orders AND/OR meds.  Please call the patient and ask the patient to schedule the testing AND/OR inform about any medications that were sent.      Orders Placed This Encounter   Procedures    Urine culture     Standing Status:   Future     Standing Expiration Date:   9/28/2021

## 2020-07-30 NOTE — TELEPHONE ENCOUNTER
----- Message from Tyra Keyes sent at 7/30/2020  9:36 AM CDT -----  Regarding: frequent urination  Contact: Gloria pt  Type: Needs Medical Advice  Who Called:  Gloria pt  Symptoms (please be specific):  frequent urination/no pains or other symptoms  How long has patient had these symptoms:  2-3 weeks  Pharmacy name and phone #:    [facility] [place], [place] - [address] [facility] [address] [id number] [address] [address]  Phone: [phone] Fax: [phone]      Best Call Back Number: [phone]  Additional Information: Pls call pt back to adv

## 2020-07-30 NOTE — TELEPHONE ENCOUNTER
Pt coming in today for a urinalysis. Having UTI symptoms. Urine already ordered. Need order for urine culture.

## 2020-07-31 ENCOUNTER — EXTERNAL CHRONIC CARE MANAGEMENT (OUTPATIENT)
Dept: PRIMARY CARE CLINIC | Facility: CLINIC | Age: 80
End: 2020-07-31
Payer: MEDICARE

## 2020-07-31 PROCEDURE — 99490 CHRNC CARE MGMT STAFF 1ST 20: CPT | Mod: PBBFAC,PO | Performed by: FAMILY MEDICINE

## 2020-07-31 PROCEDURE — 99490 CHRNC CARE MGMT STAFF 1ST 20: CPT | Mod: S$PBB,,, | Performed by: FAMILY MEDICINE

## 2020-07-31 PROCEDURE — 99490 PR CHRONIC CARE MGMT, 1ST 20 MIN: ICD-10-PCS | Mod: S$PBB,,, | Performed by: FAMILY MEDICINE

## 2020-08-04 ENCOUNTER — TELEPHONE (OUTPATIENT)
Dept: ENDOSCOPY | Facility: HOSPITAL | Age: 80
End: 2020-08-04

## 2020-08-04 DIAGNOSIS — R32 INCONTINENCE IN FEMALE: Primary | ICD-10-CM

## 2020-08-04 NOTE — TELEPHONE ENCOUNTER
----- Message from Janice Armenta LPN sent at 8/4/2020 12:53 PM CDT -----  Pt would like to be referred to urology. She does not want to travel. She wants to see someone in [place].

## 2020-08-04 NOTE — TELEPHONE ENCOUNTER
Refer to Dr. Rehman.    I have signed for the following orders AND/OR meds.  Please call the patient and ask the patient to schedule the testing AND/OR inform about any medications that were sent.      Orders Placed This Encounter   Procedures    Ambulatory referral/consult to Urology     Standing Status:   Future     Standing Expiration Date:   9/4/2021     Referral Priority:   Routine     Referral Type:   Consultation     Referral Reason:   Specialty Services Required     Referred to Provider:   Pato Rehman MD     Requested Specialty:   Urology     Number of Visits Requested:   1

## 2020-08-31 ENCOUNTER — EXTERNAL CHRONIC CARE MANAGEMENT (OUTPATIENT)
Dept: PRIMARY CARE CLINIC | Facility: CLINIC | Age: 80
End: 2020-08-31
Payer: MEDICARE

## 2020-08-31 PROCEDURE — 99490 CHRNC CARE MGMT STAFF 1ST 20: CPT | Mod: S$PBB,,, | Performed by: FAMILY MEDICINE

## 2020-08-31 PROCEDURE — 99490 PR CHRONIC CARE MGMT, 1ST 20 MIN: ICD-10-PCS | Mod: S$PBB,,, | Performed by: FAMILY MEDICINE

## 2020-08-31 PROCEDURE — 99490 CHRNC CARE MGMT STAFF 1ST 20: CPT | Mod: PBBFAC,PO | Performed by: FAMILY MEDICINE

## 2020-09-17 ENCOUNTER — TELEPHONE (OUTPATIENT)
Dept: FAMILY MEDICINE | Facility: CLINIC | Age: 80
End: 2020-09-17

## 2020-09-17 ENCOUNTER — OFFICE VISIT (OUTPATIENT)
Dept: FAMILY MEDICINE | Facility: CLINIC | Age: 80
End: 2020-09-17
Payer: MEDICARE

## 2020-09-17 DIAGNOSIS — J44.1 CHRONIC OBSTRUCTIVE PULMONARY DISEASE WITH ACUTE EXACERBATION: Primary | ICD-10-CM

## 2020-09-17 DIAGNOSIS — J20.9 BRONCHITIS WITH BRONCHOSPASM: ICD-10-CM

## 2020-09-17 PROCEDURE — 99441 PR PHYSICIAN TELEPHONE EVALUATION 5-10 MIN: ICD-10-PCS | Mod: 95,,, | Performed by: FAMILY MEDICINE

## 2020-09-17 PROCEDURE — 99441 PR PHYSICIAN TELEPHONE EVALUATION 5-10 MIN: CPT | Mod: 95,,, | Performed by: FAMILY MEDICINE

## 2020-09-17 RX ORDER — METHYLPREDNISOLONE 4 MG/1
TABLET ORAL
Qty: 21 TABLET | Refills: 0 | Status: SHIPPED | OUTPATIENT
Start: 2020-09-17 | End: 2021-08-08

## 2020-09-17 RX ORDER — AZITHROMYCIN 250 MG/1
TABLET, FILM COATED ORAL
Qty: 6 TABLET | Refills: 0 | Status: SHIPPED | OUTPATIENT
Start: 2020-09-17 | End: 2021-08-08

## 2020-09-17 NOTE — PROGRESS NOTES
The patient location is: Home  The chief complaint leading to consultation is:Upper rsp congestion   Visit type: Virtual visit with  audio    Total time spent with patient: 10 minutes  Each patient to whom he or she provides medical services by telemedicine is:  (1) informed of the relationship between the physician and patient and the respective role of any other health care provider with respect to management of the patient; and (2) notified that he or she may decline to receive medical services by telemedicine and may withdraw from such care at any time.    Notes:   Gloria Hale presents with hx COPD has recurrent moderate upper respiratory congestion cough past 4-5 days. Sl dyspnea Denies nausea,vomiting,diarrhea or fever.    Past Medical History:   Diagnosis Date    Abdominal aortic aneurysm     Bile duct obstruction     per pt/ has seen MD    Cancer of upper lobe of right lung 2/26/2018    She had a resection of her right upper lobe due to a cancer at [facility] and she reports that the nodes were all negative.   She is not needing chemo or radiation.     Cataract     CKD (chronic kidney disease) stage 3, GFR 30-59 ml/min     Dr. Philippe    Colon polyp     hyperplastic 2007    COPD (chronic obstructive pulmonary disease)     COPD with acute exacerbation     The patient presents with COPD.  The patient denies chest pain, palpitations, shortness of breath, difficulty breathing, and wheezing.  The patient feels that the pattern of symptoms is stable.  Current treatment has included albuterol inhaler.       Diplopia     DJD (degenerative joint disease), lumbar     HTN (hypertension)     Hyperlipidemia     Hypothyroidism     Lung cancer 2018    right    Obstruction of kidney     per pt/ pt does not know which kidney/ pt has appt to see nephrologist on 7/3    Osteopenia     Posterior vitreous detachment     Posterior vitreous detachment of right eye     Thyroiditis     positive  thyroperoxidase antibodies    Trochanteric bursitis      Past Surgical History:   Procedure Laterality Date    CARPAL TUNNEL RELEASE      left     SECTION, CLASSIC      x2    ESOPHAGOGASTRODUODENOSCOPY Left 2019    Procedure: EGD (ESOPHAGOGASTRODUODENOSCOPY);  Surgeon: Nicola Her MD;  Location: [facility];  Service: Endoscopy;  Laterality: Left;    LUNG REMOVAL, PARTIAL Right     [facility]     Review of patient's allergies indicates:  No Known Allergies  Current Outpatient Medications on File Prior to Visit   Medication Sig Dispense Refill    acetaminophen (TYLENOL) 325 MG tablet Take 2 tablets (650 mg total) by mouth every 4 (four) hours as needed.  0    albuterol (ACCUNEB) 0.63 mg/3 mL Nebu Take 3 mLs (0.63 mg total) by nebulization 4 (four) times daily. 360 vial 3    budesonide-formoterol 160-4.5 mcg (SYMBICORT) 160-4.5 mcg/actuation HFAA Inhale 2 puffs into the lungs every 12 (twelve) hours. Controller 1 Inhaler 2    calcium carbonate (OS-PAPITO) 500 mg calcium (1,250 mg) tablet Take 1 tablet by mouth once daily.       clobetasoL (TEMOVATE) 0.05 % cream Apply topically 2 (two) times daily. for 10 days 45 g 0    clotrimazole-betamethasone 1-0.05% (LOTRISONE) cream apply topically twice daily as needed for itching      diltiaZEM (CARDIZEM CD) 240 MG 24 hr capsule TAKE 1 CAPSULE (240 MG TOTAL) BY MOUTH 2 (TWO) TIMES DAILY. 180 capsule 0    docusate sodium (COLACE) 100 MG capsule Take 1 capsule (100 mg total) by mouth 2 (two) times daily.  0    fish oil-omega-3 fatty acids 300-1,000 mg capsule Take 1 capsule by mouth once daily.       fluticasone propionate (FLONASE) 50 mcg/actuation nasal spray Use 2 puffs in each nostril q day. 16 g 12    guaiFENesin (MUCINEX) 600 mg 12 hr tablet Take 1 tablet (600 mg total) by mouth 2 (two) times daily. 20 tablet 0    ipratropium (ATROVENT) 0.02 % nebulizer solution Take 500 mcg by nebulization 4 (four) times daily.        levocetirizine (XYZAL) 5 MG tablet Take 1 tablet (5 mg total) by mouth every evening. 30 tablet 11    levothyroxine (SYNTHROID) 75 MCG tablet Take 1 tablet (75 mcg total) by mouth once daily. 90 tablet 3    multivitamin (THERAGRAN) per tablet Take 1 tablet by mouth once daily.      rosuvastatin (CRESTOR) 40 MG Tab TAKE 1 TABLET (40 MG TOTAL) BY MOUTH ONCE DAILY. 90 tablet 0    [DISCONTINUED] methylPREDNISolone (MEDROL DOSEPACK) 4 mg tablet follow package directions 21 tablet 0     No current facility-administered medications on file prior to visit.      Social History     Socioeconomic History    Marital status:      Spouse name: Ibrahima    Number of children: 2    Years of education: Not on file    Highest education level: Not on file   Occupational History    Not on file   Social Needs    Financial resource strain: Not on file    Food insecurity     Worry: Not on file     Inability: Not on file    Transportation needs     Medical: Not on file     Non-medical: Not on file   Tobacco Use    Smoking status: Former Smoker     Packs/day: 1.00     Years: 45.00     Pack years: 45.00     Types: Cigarettes     Quit date: 2007     Years since quittin.7    Smokeless tobacco: Never Used   Substance and Sexual Activity    Alcohol use: No    Drug use: No    Sexual activity: Never     Partners: Male   Lifestyle    Physical activity     Days per week: Not on file     Minutes per session: Not on file    Stress: Not on file   Relationships    Social connections     Talks on phone: Not on file     Gets together: Not on file     Attends Orthodoxy service: Not on file     Active member of club or organization: Not on file     Attends meetings of clubs or organizations: Not on file     Relationship status: Not on file   Other Topics Concern    Not on file   Social History Narrative    Lives alone     Family History   Problem Relation Age of Onset    Coronary artery disease Mother     Diabetes Mother      Amblyopia Neg Hx     Blindness Neg Hx     Cancer Neg Hx     Cataracts Neg Hx     Glaucoma Neg Hx     Hypertension Neg Hx     Macular degeneration Neg Hx     Retinal detachment Neg Hx     Strabismus Neg Hx     Stroke Neg Hx     Thyroid disease Neg Hx          ROS:  SKIN: No rashes, itching or changes in color or texture of skin.  EYES: Visual acuity fine. No photophobia, ocular pain or diplopia.EARS: Denies ear pain, discharge or vertigo.NOSE: No loss of smell, no epistaxis some postnasal drip.MOUTH & THROAT: No hoarseness or change in voice. No excessive gum bleeding.CHEST: Denies DOMINGUEZ, cyanosis, wheezing  CARDIOVASCULAR: Denies chest pain, PND, orthopnea or reduced exercise tolerance.  ABDOMEN:  No weight loss.No abdominal pain, no hematemesis or blood in stool.  URINARY: No flank pain, dysuria or hematuria.  PERIPHERAL VASCULAR: No claudication or cyanosis.  MUSCULOSKELETAL: Negative   NEUROLOGIC: No history of seizures, paralysis, alteration of gait or coordination.    PE: Heent:Normocephalic with no recent cranial trauma,PERRLA,EOMI,conjunctiva clear,Nasal mucosa slightly red and edematous.Posterior pharynx slightly red but without exudate.  Chest:No tachypnea. Has sl  wheezing, rhonchi on forced expiration  Abdomen:Soft, non tender to patient palpation  Impression: COPD exac   Plan: Zpac medrol dspk and cautious fu

## 2020-09-17 NOTE — TELEPHONE ENCOUNTER
Spoke with patient and she is coughing, has chest congestion and some sob when she is active. She is requesting abx be sent to pharmacy. Scheduled audio visit for today with Dr Andino.

## 2020-09-17 NOTE — TELEPHONE ENCOUNTER
----- Message from Chelsea Wang sent at 9/17/2020  8:34 AM CDT -----  Regarding: medication refill  Good morning,      Pt is requesting a call back in regards to medication refill and can be reached at [phone]        [facility] [address] [facility] [address] [address] [address] [address]  Phone: [phone] Fax: [phone]    ThanksChelsea

## 2020-09-30 ENCOUNTER — EXTERNAL CHRONIC CARE MANAGEMENT (OUTPATIENT)
Dept: PRIMARY CARE CLINIC | Facility: CLINIC | Age: 80
End: 2020-09-30
Payer: MEDICARE

## 2020-09-30 PROCEDURE — 99490 CHRNC CARE MGMT STAFF 1ST 20: CPT | Mod: PBBFAC,PO | Performed by: FAMILY MEDICINE

## 2020-09-30 PROCEDURE — 99490 CHRNC CARE MGMT STAFF 1ST 20: CPT | Mod: S$PBB,,, | Performed by: FAMILY MEDICINE

## 2020-09-30 PROCEDURE — 99490 PR CHRONIC CARE MGMT, 1ST 20 MIN: ICD-10-PCS | Mod: S$PBB,,, | Performed by: FAMILY MEDICINE

## 2020-10-12 ENCOUNTER — TELEPHONE (OUTPATIENT)
Dept: FAMILY MEDICINE | Facility: CLINIC | Age: 80
End: 2020-10-12

## 2020-10-12 NOTE — TELEPHONE ENCOUNTER
----- Message from Emily Slaughter sent at 10/12/2020  1:19 PM CDT -----  Regarding: walker  Contact: patient  Patient requesting a prescription for a walker, please call her back at    [phone]

## 2020-10-12 NOTE — TELEPHONE ENCOUNTER
----- Message from Tamara King sent at 10/12/2020  2:59 PM CDT -----  Regarding: Returning call  Contact: Pt  Type:  Patient Returning Call    Who Called:Gloria Hale  Who Left Message for Patient:unk  Does the patient know what this is regarding?: request for walker  Would the patient rather a call back or a response via [facility]? Call back   Best Call Back Number:[phone]  Additional Information:

## 2020-10-14 ENCOUNTER — TELEPHONE (OUTPATIENT)
Dept: FAMILY MEDICINE | Facility: CLINIC | Age: 80
End: 2020-10-14

## 2020-10-14 NOTE — TELEPHONE ENCOUNTER
----- Message from Nehemiah Perry sent at 10/14/2020 11:33 AM CDT -----  ..Type:  Patient Returning Call    Who Called pt   Who Left Message for Patient:  Does the patient know what this is regarding?: prescription   Would the patient rather a call back or a response via [facility]? Call back   Best Call Back Number: [phone]  Additional Information:  discuss ap medication

## 2020-10-31 ENCOUNTER — EXTERNAL CHRONIC CARE MANAGEMENT (OUTPATIENT)
Dept: PRIMARY CARE CLINIC | Facility: CLINIC | Age: 80
End: 2020-10-31
Payer: MEDICARE

## 2020-10-31 PROCEDURE — 99490 PR CHRONIC CARE MGMT, 1ST 20 MIN: ICD-10-PCS | Mod: S$PBB,,, | Performed by: FAMILY MEDICINE

## 2020-10-31 PROCEDURE — 99490 CHRNC CARE MGMT STAFF 1ST 20: CPT | Mod: S$PBB,,, | Performed by: FAMILY MEDICINE

## 2020-10-31 PROCEDURE — 99490 CHRNC CARE MGMT STAFF 1ST 20: CPT | Mod: PBBFAC,PO | Performed by: FAMILY MEDICINE

## 2020-11-30 ENCOUNTER — EXTERNAL CHRONIC CARE MANAGEMENT (OUTPATIENT)
Dept: PRIMARY CARE CLINIC | Facility: CLINIC | Age: 80
End: 2020-11-30
Payer: MEDICARE

## 2020-11-30 PROCEDURE — 99490 PR CHRONIC CARE MGMT, 1ST 20 MIN: ICD-10-PCS | Mod: S$PBB,,, | Performed by: FAMILY MEDICINE

## 2020-11-30 PROCEDURE — 99490 CHRNC CARE MGMT STAFF 1ST 20: CPT | Mod: S$PBB,,, | Performed by: FAMILY MEDICINE

## 2020-11-30 PROCEDURE — 99490 CHRNC CARE MGMT STAFF 1ST 20: CPT | Mod: PBBFAC,PO | Performed by: FAMILY MEDICINE

## 2020-12-31 ENCOUNTER — EXTERNAL CHRONIC CARE MANAGEMENT (OUTPATIENT)
Dept: PRIMARY CARE CLINIC | Facility: CLINIC | Age: 80
End: 2020-12-31
Payer: MEDICARE

## 2020-12-31 PROCEDURE — 99490 CHRNC CARE MGMT STAFF 1ST 20: CPT | Mod: S$PBB,,, | Performed by: FAMILY MEDICINE

## 2020-12-31 PROCEDURE — 99490 PR CHRONIC CARE MGMT, 1ST 20 MIN: ICD-10-PCS | Mod: S$PBB,,, | Performed by: FAMILY MEDICINE

## 2020-12-31 PROCEDURE — 99490 CHRNC CARE MGMT STAFF 1ST 20: CPT | Mod: PBBFAC,PO | Performed by: FAMILY MEDICINE

## 2021-01-06 ENCOUNTER — TELEPHONE (OUTPATIENT)
Dept: FAMILY MEDICINE | Facility: CLINIC | Age: 81
End: 2021-01-06

## 2021-01-07 DIAGNOSIS — E78.00 PURE HYPERCHOLESTEROLEMIA, UNSPECIFIED: ICD-10-CM

## 2021-01-07 DIAGNOSIS — I10 ESSENTIAL HYPERTENSION: ICD-10-CM

## 2021-01-07 DIAGNOSIS — Z00.00 ANNUAL PHYSICAL EXAM: Primary | ICD-10-CM

## 2021-01-07 DIAGNOSIS — I47.19 ECTOPIC ATRIAL TACHYCARDIA: ICD-10-CM

## 2021-01-07 DIAGNOSIS — E78.5 DYSLIPIDEMIA: Chronic | ICD-10-CM

## 2021-01-07 RX ORDER — ROSUVASTATIN CALCIUM 40 MG/1
40 TABLET, COATED ORAL DAILY
Qty: 90 TABLET | Refills: 0 | Status: SHIPPED | OUTPATIENT
Start: 2021-01-07 | End: 2021-04-06

## 2021-01-07 RX ORDER — DILTIAZEM HYDROCHLORIDE 240 MG/1
CAPSULE, COATED, EXTENDED RELEASE ORAL
Qty: 180 CAPSULE | Refills: 0 | Status: SHIPPED | OUTPATIENT
Start: 2021-01-07 | End: 2021-07-19

## 2021-01-12 NOTE — TELEPHONE ENCOUNTER
----- Message from Gabriel Yousif sent at 2/16/2018  2:13 PM CST -----  Contact: Pt  Pt request a call from the nurse to r/s her apt she has on monday but did not want the next avail in March, pt wants to get something before then and does not want to see anyone else, please contact the pt at [phone]   I called the patient on 1/8/21 and told her about the radiologist comments.  She said she was getting better with padding.  I told her this could be a process before she gets completely well as the bone has to go through remodeling phases.  We discussed possible orthotics or better accomodation

## 2021-01-14 ENCOUNTER — OFFICE VISIT (OUTPATIENT)
Dept: FAMILY MEDICINE | Facility: CLINIC | Age: 81
End: 2021-01-14
Payer: MEDICARE

## 2021-01-14 DIAGNOSIS — J44.1 CHRONIC OBSTRUCTIVE PULMONARY DISEASE WITH ACUTE EXACERBATION: Primary | ICD-10-CM

## 2021-01-14 PROCEDURE — 99441 PR PHYSICIAN TELEPHONE EVALUATION 5-10 MIN: ICD-10-PCS | Mod: 95,,, | Performed by: FAMILY MEDICINE

## 2021-01-14 PROCEDURE — 99441 PR PHYSICIAN TELEPHONE EVALUATION 5-10 MIN: CPT | Mod: 95,,, | Performed by: FAMILY MEDICINE

## 2021-01-14 RX ORDER — AMOXICILLIN AND CLAVULANATE POTASSIUM 875; 125 MG/1; MG/1
1 TABLET, FILM COATED ORAL 2 TIMES DAILY
Qty: 20 TABLET | Refills: 0 | Status: SHIPPED | OUTPATIENT
Start: 2021-01-14 | End: 2021-08-08

## 2021-01-20 ENCOUNTER — TELEPHONE (OUTPATIENT)
Dept: FAMILY MEDICINE | Facility: CLINIC | Age: 81
End: 2021-01-20

## 2021-01-20 DIAGNOSIS — Z12.31 VISIT FOR SCREENING MAMMOGRAM: Primary | ICD-10-CM

## 2021-01-20 DIAGNOSIS — I71.40 ABDOMINAL AORTIC ANEURYSM (AAA) WITHOUT RUPTURE: ICD-10-CM

## 2021-01-25 ENCOUNTER — TELEPHONE (OUTPATIENT)
Dept: INTERNAL MEDICINE | Facility: CLINIC | Age: 81
End: 2021-01-25

## 2021-01-26 ENCOUNTER — HOSPITAL ENCOUNTER (OUTPATIENT)
Dept: RADIOLOGY | Facility: HOSPITAL | Age: 81
Discharge: HOME | End: 2021-01-26
Attending: FAMILY MEDICINE
Payer: MEDICARE

## 2021-01-26 VITALS — WEIGHT: 145.94 LBS | BODY MASS INDEX: 27.55 KG/M2 | HEIGHT: 61 IN

## 2021-01-26 DIAGNOSIS — Z12.31 VISIT FOR SCREENING MAMMOGRAM: ICD-10-CM

## 2021-01-26 PROCEDURE — 77063 BREAST TOMOSYNTHESIS BI: CPT | Mod: 26,,, | Performed by: RADIOLOGY

## 2021-01-26 PROCEDURE — 77067 SCR MAMMO BI INCL CAD: CPT | Mod: TC,PO

## 2021-01-26 PROCEDURE — 77067 SCR MAMMO BI INCL CAD: CPT | Mod: 26,,, | Performed by: RADIOLOGY

## 2021-01-26 PROCEDURE — 77067 MAMMO DIGITAL SCREENING BILAT WITH TOMO: ICD-10-PCS | Mod: 26,,, | Performed by: RADIOLOGY

## 2021-01-26 PROCEDURE — 77063 MAMMO DIGITAL SCREENING BILAT WITH TOMO: ICD-10-PCS | Mod: 26,,, | Performed by: RADIOLOGY

## 2021-01-31 ENCOUNTER — EXTERNAL CHRONIC CARE MANAGEMENT (OUTPATIENT)
Dept: PRIMARY CARE CLINIC | Facility: CLINIC | Age: 81
End: 2021-01-31
Payer: MEDICARE

## 2021-01-31 PROCEDURE — 99490 CHRNC CARE MGMT STAFF 1ST 20: CPT | Mod: PBBFAC,PO | Performed by: FAMILY MEDICINE

## 2021-01-31 PROCEDURE — 99490 CHRNC CARE MGMT STAFF 1ST 20: CPT | Mod: S$PBB,,, | Performed by: FAMILY MEDICINE

## 2021-01-31 PROCEDURE — 99490 PR CHRONIC CARE MGMT, 1ST 20 MIN: ICD-10-PCS | Mod: S$PBB,,, | Performed by: FAMILY MEDICINE

## 2021-02-22 ENCOUNTER — TELEPHONE (OUTPATIENT)
Dept: RADIOLOGY | Facility: HOSPITAL | Age: 81
End: 2021-02-22

## 2021-02-24 ENCOUNTER — HOSPITAL ENCOUNTER (OUTPATIENT)
Dept: RADIOLOGY | Facility: HOSPITAL | Age: 81
Discharge: HOME | End: 2021-02-24
Attending: FAMILY MEDICINE
Payer: MEDICARE

## 2021-02-24 DIAGNOSIS — I71.40 ABDOMINAL AORTIC ANEURYSM (AAA) WITHOUT RUPTURE: ICD-10-CM

## 2021-02-24 PROCEDURE — 76775 US EXAM ABDO BACK WALL LIM: CPT | Mod: 26,,, | Performed by: RADIOLOGY

## 2021-02-24 PROCEDURE — 76775 US ABDOMINAL AORTA: ICD-10-PCS | Mod: 26,,, | Performed by: RADIOLOGY

## 2021-02-24 PROCEDURE — 76775 US EXAM ABDO BACK WALL LIM: CPT | Mod: TC,PO

## 2021-02-28 ENCOUNTER — EXTERNAL CHRONIC CARE MANAGEMENT (OUTPATIENT)
Dept: PRIMARY CARE CLINIC | Facility: CLINIC | Age: 81
End: 2021-02-28
Payer: MEDICARE

## 2021-02-28 PROCEDURE — 99490 CHRNC CARE MGMT STAFF 1ST 20: CPT | Mod: PBBFAC,PO | Performed by: FAMILY MEDICINE

## 2021-02-28 PROCEDURE — 99490 CHRNC CARE MGMT STAFF 1ST 20: CPT | Mod: S$PBB,,, | Performed by: FAMILY MEDICINE

## 2021-02-28 PROCEDURE — 99490 PR CHRONIC CARE MGMT, 1ST 20 MIN: ICD-10-PCS | Mod: S$PBB,,, | Performed by: FAMILY MEDICINE

## 2021-03-04 DIAGNOSIS — E03.9 ACQUIRED HYPOTHYROIDISM: ICD-10-CM

## 2021-03-04 RX ORDER — LEVOTHYROXINE SODIUM 75 UG/1
75 TABLET ORAL DAILY
Qty: 90 TABLET | Refills: 0 | Status: SHIPPED | OUTPATIENT
Start: 2021-03-04 | End: 2021-03-31 | Stop reason: SDUPTHER

## 2021-03-04 RX ORDER — FLUTICASONE PROPIONATE 50 MCG
SPRAY, SUSPENSION (ML) NASAL
Qty: 16 G | Refills: 0 | Status: SHIPPED | OUTPATIENT
Start: 2021-03-04 | End: 2021-04-06

## 2021-03-25 ENCOUNTER — PES CALL (OUTPATIENT)
Dept: ADMINISTRATIVE | Facility: CLINIC | Age: 81
End: 2021-03-25

## 2021-03-30 ENCOUNTER — LAB VISIT (OUTPATIENT)
Dept: LAB | Facility: HOSPITAL | Age: 81
End: 2021-03-30
Attending: FAMILY MEDICINE
Payer: MEDICARE

## 2021-03-30 DIAGNOSIS — E03.9 ACQUIRED HYPOTHYROIDISM: ICD-10-CM

## 2021-03-30 LAB — TSH SERPL DL<=0.005 MIU/L-ACNC: 1.14 UIU/ML (ref 0.4–4)

## 2021-03-30 PROCEDURE — 84443 ASSAY THYROID STIM HORMONE: CPT | Performed by: FAMILY MEDICINE

## 2021-03-30 PROCEDURE — 36415 COLL VENOUS BLD VENIPUNCTURE: CPT | Mod: PO | Performed by: FAMILY MEDICINE

## 2021-03-31 ENCOUNTER — EXTERNAL CHRONIC CARE MANAGEMENT (OUTPATIENT)
Dept: PRIMARY CARE CLINIC | Facility: CLINIC | Age: 81
End: 2021-03-31
Payer: MEDICARE

## 2021-03-31 DIAGNOSIS — E03.9 ACQUIRED HYPOTHYROIDISM: ICD-10-CM

## 2021-03-31 PROCEDURE — 99490 CHRNC CARE MGMT STAFF 1ST 20: CPT | Mod: PBBFAC,PO | Performed by: FAMILY MEDICINE

## 2021-03-31 PROCEDURE — 99490 CHRNC CARE MGMT STAFF 1ST 20: CPT | Mod: S$PBB,,, | Performed by: FAMILY MEDICINE

## 2021-03-31 PROCEDURE — 99490 PR CHRONIC CARE MGMT, 1ST 20 MIN: ICD-10-PCS | Mod: S$PBB,,, | Performed by: FAMILY MEDICINE

## 2021-03-31 RX ORDER — LEVOTHYROXINE SODIUM 75 UG/1
75 TABLET ORAL DAILY
Qty: 90 TABLET | Refills: 3 | Status: SHIPPED | OUTPATIENT
Start: 2021-03-31 | End: 2022-02-17

## 2021-04-12 ENCOUNTER — TELEPHONE (OUTPATIENT)
Dept: FAMILY MEDICINE | Facility: CLINIC | Age: 81
End: 2021-04-12

## 2021-04-12 ENCOUNTER — EXTERNAL HOSPITAL ADMISSION (OUTPATIENT)
Dept: ADMINISTRATIVE | Facility: CLINIC | Age: 81
End: 2021-04-12

## 2021-04-12 ENCOUNTER — PATIENT OUTREACH (OUTPATIENT)
Dept: ADMINISTRATIVE | Facility: CLINIC | Age: 81
End: 2021-04-12

## 2021-04-26 ENCOUNTER — OFFICE VISIT (OUTPATIENT)
Dept: FAMILY MEDICINE | Facility: CLINIC | Age: 81
End: 2021-04-26
Payer: MEDICARE

## 2021-04-26 VITALS
HEART RATE: 80 BPM | BODY MASS INDEX: 25.86 KG/M2 | TEMPERATURE: 98 F | DIASTOLIC BLOOD PRESSURE: 80 MMHG | SYSTOLIC BLOOD PRESSURE: 132 MMHG | WEIGHT: 137 LBS | HEIGHT: 61 IN

## 2021-04-26 DIAGNOSIS — R91.1 PULMONARY NODULE: ICD-10-CM

## 2021-04-26 DIAGNOSIS — K80.50 CHOLEDOCHOLITHIASIS: ICD-10-CM

## 2021-04-26 DIAGNOSIS — K80.70 CALCULUS OF GALLBLADDER AND BILE DUCT WITHOUT CHOLECYSTITIS OR OBSTRUCTION: Primary | ICD-10-CM

## 2021-04-26 DIAGNOSIS — N18.32 STAGE 3B CHRONIC KIDNEY DISEASE: ICD-10-CM

## 2021-04-26 DIAGNOSIS — R73.09 ELEVATED HEMOGLOBIN A1C: ICD-10-CM

## 2021-04-26 PROCEDURE — 99999 PR PBB SHADOW E&M-EST. PATIENT-LVL V: ICD-10-PCS | Mod: PBBFAC,,, | Performed by: FAMILY MEDICINE

## 2021-04-26 PROCEDURE — 99213 OFFICE O/P EST LOW 20 MIN: CPT | Mod: S$PBB,,, | Performed by: FAMILY MEDICINE

## 2021-04-26 PROCEDURE — 99213 PR OFFICE/OUTPT VISIT, EST, LEVL III, 20-29 MIN: ICD-10-PCS | Mod: S$PBB,,, | Performed by: FAMILY MEDICINE

## 2021-04-26 PROCEDURE — 99999 PR PBB SHADOW E&M-EST. PATIENT-LVL V: CPT | Mod: PBBFAC,,, | Performed by: FAMILY MEDICINE

## 2021-04-26 PROCEDURE — 99215 OFFICE O/P EST HI 40 MIN: CPT | Mod: PBBFAC,PO | Performed by: FAMILY MEDICINE

## 2021-04-30 ENCOUNTER — EXTERNAL CHRONIC CARE MANAGEMENT (OUTPATIENT)
Dept: PRIMARY CARE CLINIC | Facility: CLINIC | Age: 81
End: 2021-04-30
Payer: MEDICARE

## 2021-04-30 PROCEDURE — 99490 CHRNC CARE MGMT STAFF 1ST 20: CPT | Mod: PBBFAC,PO | Performed by: FAMILY MEDICINE

## 2021-04-30 PROCEDURE — 99490 PR CHRONIC CARE MGMT, 1ST 20 MIN: ICD-10-PCS | Mod: S$PBB,,, | Performed by: FAMILY MEDICINE

## 2021-04-30 PROCEDURE — 99490 CHRNC CARE MGMT STAFF 1ST 20: CPT | Mod: S$PBB,,, | Performed by: FAMILY MEDICINE

## 2021-05-05 ENCOUNTER — TELEPHONE (OUTPATIENT)
Dept: FAMILY MEDICINE | Facility: CLINIC | Age: 81
End: 2021-05-05

## 2021-05-05 ENCOUNTER — LAB VISIT (OUTPATIENT)
Dept: LAB | Facility: HOSPITAL | Age: 81
End: 2021-05-05
Attending: FAMILY MEDICINE
Payer: MEDICARE

## 2021-05-05 DIAGNOSIS — R73.09 ELEVATED HEMOGLOBIN A1C: ICD-10-CM

## 2021-05-05 DIAGNOSIS — R73.09 ELEVATED HEMOGLOBIN A1C: Primary | ICD-10-CM

## 2021-05-05 PROCEDURE — 84156 ASSAY OF PROTEIN URINE: CPT | Performed by: FAMILY MEDICINE

## 2021-05-06 ENCOUNTER — OUTPATIENT CASE MANAGEMENT (OUTPATIENT)
Dept: ADMINISTRATIVE | Facility: OTHER | Age: 81
End: 2021-05-06

## 2021-05-06 LAB
CREAT UR-MCNC: 26 MG/DL (ref 15–325)
PROT UR-MCNC: 24 MG/DL (ref 0–15)
PROT/CREAT UR: 0.92 MG/G{CREAT} (ref 0–0.2)

## 2021-05-13 ENCOUNTER — TELEPHONE (OUTPATIENT)
Dept: FAMILY MEDICINE | Facility: CLINIC | Age: 81
End: 2021-05-13

## 2021-05-17 ENCOUNTER — OFFICE VISIT (OUTPATIENT)
Dept: FAMILY MEDICINE | Facility: CLINIC | Age: 81
End: 2021-05-17
Payer: MEDICARE

## 2021-05-17 VITALS
SYSTOLIC BLOOD PRESSURE: 127 MMHG | BODY MASS INDEX: 25.86 KG/M2 | HEART RATE: 88 BPM | HEIGHT: 61 IN | DIASTOLIC BLOOD PRESSURE: 65 MMHG | TEMPERATURE: 98 F | WEIGHT: 137 LBS

## 2021-05-17 DIAGNOSIS — M54.42 CHRONIC LEFT-SIDED LOW BACK PAIN WITH LEFT-SIDED SCIATICA: ICD-10-CM

## 2021-05-17 DIAGNOSIS — M47.816 OSTEOARTHRITIS OF LUMBAR SPINE, UNSPECIFIED SPINAL OSTEOARTHRITIS COMPLICATION STATUS: ICD-10-CM

## 2021-05-17 DIAGNOSIS — H91.90 DECREASED HEARING, UNSPECIFIED LATERALITY: ICD-10-CM

## 2021-05-17 DIAGNOSIS — J44.1 CHRONIC OBSTRUCTIVE PULMONARY DISEASE WITH ACUTE EXACERBATION: ICD-10-CM

## 2021-05-17 DIAGNOSIS — K63.5 POLYP OF COLON, UNSPECIFIED PART OF COLON, UNSPECIFIED TYPE: ICD-10-CM

## 2021-05-17 DIAGNOSIS — M81.0 AGE-RELATED OSTEOPOROSIS WITHOUT CURRENT PATHOLOGICAL FRACTURE: ICD-10-CM

## 2021-05-17 DIAGNOSIS — I47.19 ECTOPIC ATRIAL TACHYCARDIA: ICD-10-CM

## 2021-05-17 DIAGNOSIS — J43.2 CENTRILOBULAR EMPHYSEMA: ICD-10-CM

## 2021-05-17 DIAGNOSIS — C34.11 MALIGNANT NEOPLASM OF UPPER LOBE OF RIGHT LUNG: ICD-10-CM

## 2021-05-17 DIAGNOSIS — G89.29 CHRONIC LEFT-SIDED LOW BACK PAIN WITH LEFT-SIDED SCIATICA: ICD-10-CM

## 2021-05-17 DIAGNOSIS — M16.12 PRIMARY OSTEOARTHRITIS OF LEFT HIP: ICD-10-CM

## 2021-05-17 DIAGNOSIS — E03.9 ACQUIRED HYPOTHYROIDISM: ICD-10-CM

## 2021-05-17 DIAGNOSIS — I71.40 ABDOMINAL AORTIC ANEURYSM (AAA) WITHOUT RUPTURE: ICD-10-CM

## 2021-05-17 DIAGNOSIS — Z00.00 ENCOUNTER FOR PREVENTIVE CARE: Primary | ICD-10-CM

## 2021-05-17 DIAGNOSIS — R09.89 BILATERAL CAROTID BRUITS: ICD-10-CM

## 2021-05-17 DIAGNOSIS — I10 ESSENTIAL HYPERTENSION: ICD-10-CM

## 2021-05-17 DIAGNOSIS — E78.5 DYSLIPIDEMIA: Chronic | ICD-10-CM

## 2021-05-17 DIAGNOSIS — N18.30 STAGE 3 CHRONIC KIDNEY DISEASE, UNSPECIFIED WHETHER STAGE 3A OR 3B CKD: ICD-10-CM

## 2021-05-17 DIAGNOSIS — R01.1 SYSTOLIC MURMUR: ICD-10-CM

## 2021-05-17 DIAGNOSIS — H25.13 NUCLEAR SCLEROSIS OF BOTH EYES: ICD-10-CM

## 2021-05-17 PROCEDURE — G0439 PPPS, SUBSEQ VISIT: HCPCS | Mod: ,,, | Performed by: NURSE PRACTITIONER

## 2021-05-17 PROCEDURE — 99215 OFFICE O/P EST HI 40 MIN: CPT | Mod: PBBFAC,PO | Performed by: NURSE PRACTITIONER

## 2021-05-17 PROCEDURE — G0439 PR MEDICARE ANNUAL WELLNESS SUBSEQUENT VISIT: ICD-10-PCS | Mod: ,,, | Performed by: NURSE PRACTITIONER

## 2021-05-17 PROCEDURE — 99999 PR PBB SHADOW E&M-EST. PATIENT-LVL V: ICD-10-PCS | Mod: PBBFAC,,, | Performed by: NURSE PRACTITIONER

## 2021-05-17 PROCEDURE — 99999 PR PBB SHADOW E&M-EST. PATIENT-LVL V: CPT | Mod: PBBFAC,,, | Performed by: NURSE PRACTITIONER

## 2021-05-18 RX ORDER — FLUTICASONE PROPIONATE 50 MCG
2 SPRAY, SUSPENSION (ML) NASAL DAILY
Qty: 16 G | Refills: 11 | Status: SHIPPED | OUTPATIENT
Start: 2021-05-18 | End: 2022-06-03

## 2021-05-31 ENCOUNTER — EXTERNAL CHRONIC CARE MANAGEMENT (OUTPATIENT)
Dept: PRIMARY CARE CLINIC | Facility: CLINIC | Age: 81
End: 2021-05-31
Payer: MEDICARE

## 2021-05-31 PROCEDURE — 99490 PR CHRONIC CARE MGMT, 1ST 20 MIN: ICD-10-PCS | Mod: S$PBB,,, | Performed by: FAMILY MEDICINE

## 2021-05-31 PROCEDURE — 99490 CHRNC CARE MGMT STAFF 1ST 20: CPT | Mod: PBBFAC,PO | Performed by: FAMILY MEDICINE

## 2021-05-31 PROCEDURE — 99490 CHRNC CARE MGMT STAFF 1ST 20: CPT | Mod: S$PBB,,, | Performed by: FAMILY MEDICINE

## 2021-06-16 ENCOUNTER — TELEPHONE (OUTPATIENT)
Dept: FAMILY MEDICINE | Facility: CLINIC | Age: 81
End: 2021-06-16

## 2021-06-16 DIAGNOSIS — R10.9 FLANK PAIN: Primary | ICD-10-CM

## 2021-06-17 ENCOUNTER — LAB VISIT (OUTPATIENT)
Dept: LAB | Facility: HOSPITAL | Age: 81
End: 2021-06-17
Attending: FAMILY MEDICINE
Payer: MEDICARE

## 2021-06-17 DIAGNOSIS — R10.9 FLANK PAIN: ICD-10-CM

## 2021-06-17 LAB
BACTERIA #/AREA URNS AUTO: NORMAL /HPF
BILIRUB UR QL STRIP: NEGATIVE
CLARITY UR REFRACT.AUTO: CLEAR
COLOR UR AUTO: ABNORMAL
GLUCOSE UR QL STRIP: NEGATIVE
HGB UR QL STRIP: NEGATIVE
HYALINE CASTS UR QL AUTO: 0 /LPF
KETONES UR QL STRIP: NEGATIVE
LEUKOCYTE ESTERASE UR QL STRIP: NEGATIVE
MICROSCOPIC COMMENT: NORMAL
NITRITE UR QL STRIP: NEGATIVE
PH UR STRIP: 6 [PH] (ref 5–8)
PROT UR QL STRIP: ABNORMAL
RBC #/AREA URNS AUTO: 0 /HPF (ref 0–4)
SP GR UR STRIP: 1.01 (ref 1–1.03)
URN SPEC COLLECT METH UR: ABNORMAL
WBC #/AREA URNS AUTO: 1 /HPF (ref 0–5)

## 2021-06-17 PROCEDURE — 81001 URINALYSIS AUTO W/SCOPE: CPT | Performed by: FAMILY MEDICINE

## 2021-06-25 ENCOUNTER — OFFICE VISIT (OUTPATIENT)
Dept: FAMILY MEDICINE | Facility: CLINIC | Age: 81
End: 2021-06-25
Payer: MEDICARE

## 2021-06-25 VITALS
BODY MASS INDEX: 25.43 KG/M2 | SYSTOLIC BLOOD PRESSURE: 163 MMHG | HEART RATE: 72 BPM | TEMPERATURE: 98 F | DIASTOLIC BLOOD PRESSURE: 77 MMHG | HEIGHT: 61 IN | WEIGHT: 134.69 LBS

## 2021-06-25 DIAGNOSIS — B02.8 HERPES ZOSTER WITH COMPLICATION: Primary | ICD-10-CM

## 2021-06-25 DIAGNOSIS — R21 RASH: ICD-10-CM

## 2021-06-25 PROCEDURE — 99213 PR OFFICE/OUTPT VISIT, EST, LEVL III, 20-29 MIN: ICD-10-PCS | Mod: S$PBB,,, | Performed by: NURSE PRACTITIONER

## 2021-06-25 PROCEDURE — 99999 PR PBB SHADOW E&M-EST. PATIENT-LVL V: ICD-10-PCS | Mod: PBBFAC,,, | Performed by: NURSE PRACTITIONER

## 2021-06-25 PROCEDURE — 99215 OFFICE O/P EST HI 40 MIN: CPT | Mod: PBBFAC,PO | Performed by: NURSE PRACTITIONER

## 2021-06-25 PROCEDURE — 99213 OFFICE O/P EST LOW 20 MIN: CPT | Mod: S$PBB,,, | Performed by: NURSE PRACTITIONER

## 2021-06-25 PROCEDURE — 99999 PR PBB SHADOW E&M-EST. PATIENT-LVL V: CPT | Mod: PBBFAC,,, | Performed by: NURSE PRACTITIONER

## 2021-06-25 RX ORDER — ONDANSETRON 4 MG/1
TABLET, ORALLY DISINTEGRATING ORAL
COMMUNITY
Start: 2021-06-07

## 2021-06-25 RX ORDER — PREDNISONE 20 MG/1
20 TABLET ORAL DAILY
COMMUNITY
Start: 2021-06-08 | End: 2021-10-25

## 2021-06-25 RX ORDER — MUPIROCIN 20 MG/G
OINTMENT TOPICAL 3 TIMES DAILY
Qty: 22 G | Refills: 0 | Status: SHIPPED | OUTPATIENT
Start: 2021-06-25 | End: 2021-07-02

## 2021-06-25 RX ORDER — VALACYCLOVIR HYDROCHLORIDE 1 G/1
1000 TABLET, FILM COATED ORAL EVERY 8 HOURS
Qty: 21 TABLET | Refills: 0 | Status: SHIPPED | OUTPATIENT
Start: 2021-06-25 | End: 2021-07-01 | Stop reason: SDUPTHER

## 2021-06-25 RX ORDER — LEVOFLOXACIN 500 MG/1
500 TABLET, FILM COATED ORAL DAILY
COMMUNITY
Start: 2021-06-08 | End: 2021-10-25

## 2021-06-30 ENCOUNTER — EXTERNAL CHRONIC CARE MANAGEMENT (OUTPATIENT)
Dept: PRIMARY CARE CLINIC | Facility: CLINIC | Age: 81
End: 2021-06-30
Payer: MEDICARE

## 2021-06-30 PROCEDURE — 99490 CHRNC CARE MGMT STAFF 1ST 20: CPT | Mod: PBBFAC,PO | Performed by: FAMILY MEDICINE

## 2021-06-30 PROCEDURE — 99490 CHRNC CARE MGMT STAFF 1ST 20: CPT | Mod: S$PBB,,, | Performed by: FAMILY MEDICINE

## 2021-06-30 PROCEDURE — 99490 PR CHRONIC CARE MGMT, 1ST 20 MIN: ICD-10-PCS | Mod: S$PBB,,, | Performed by: FAMILY MEDICINE

## 2021-07-01 DIAGNOSIS — B02.8 HERPES ZOSTER WITH COMPLICATION: Primary | ICD-10-CM

## 2021-07-01 RX ORDER — TRIAMCINOLONE ACETONIDE 5 MG/G
CREAM TOPICAL 2 TIMES DAILY
Qty: 30 G | Refills: 0 | Status: SHIPPED | OUTPATIENT
Start: 2021-07-01 | End: 2021-07-11

## 2021-07-01 RX ORDER — VALACYCLOVIR HYDROCHLORIDE 1 G/1
1000 TABLET, FILM COATED ORAL EVERY 8 HOURS
Qty: 21 TABLET | Refills: 0 | Status: SHIPPED | OUTPATIENT
Start: 2021-07-01 | End: 2021-10-25

## 2021-07-31 ENCOUNTER — EXTERNAL CHRONIC CARE MANAGEMENT (OUTPATIENT)
Dept: PRIMARY CARE CLINIC | Facility: CLINIC | Age: 81
End: 2021-07-31
Payer: MEDICARE

## 2021-07-31 PROCEDURE — 99490 PR CHRONIC CARE MGMT, 1ST 20 MIN: ICD-10-PCS | Mod: S$PBB,,, | Performed by: FAMILY MEDICINE

## 2021-07-31 PROCEDURE — 99490 CHRNC CARE MGMT STAFF 1ST 20: CPT | Mod: S$PBB,,, | Performed by: FAMILY MEDICINE

## 2021-07-31 PROCEDURE — 99490 CHRNC CARE MGMT STAFF 1ST 20: CPT | Mod: PBBFAC,PO | Performed by: FAMILY MEDICINE

## 2021-08-05 ENCOUNTER — TELEPHONE (OUTPATIENT)
Dept: NEPHROLOGY | Facility: CLINIC | Age: 81
End: 2021-08-05

## 2021-08-05 ENCOUNTER — OFFICE VISIT (OUTPATIENT)
Dept: NEPHROLOGY | Facility: CLINIC | Age: 81
End: 2021-08-05
Payer: MEDICARE

## 2021-08-05 DIAGNOSIS — N18.31 STAGE 3A CHRONIC KIDNEY DISEASE: Primary | ICD-10-CM

## 2021-08-05 DIAGNOSIS — C34.11 MALIGNANT NEOPLASM OF UPPER LOBE OF RIGHT LUNG: ICD-10-CM

## 2021-08-05 DIAGNOSIS — R13.14 PHARYNGOESOPHAGEAL DYSPHAGIA: ICD-10-CM

## 2021-08-05 DIAGNOSIS — J44.1 CHRONIC OBSTRUCTIVE PULMONARY DISEASE WITH ACUTE EXACERBATION: ICD-10-CM

## 2021-08-05 DIAGNOSIS — J43.2 CENTRILOBULAR EMPHYSEMA: ICD-10-CM

## 2021-08-05 DIAGNOSIS — Z87.891 FORMER SMOKER: ICD-10-CM

## 2021-08-05 DIAGNOSIS — M81.0 AGE-RELATED OSTEOPOROSIS WITHOUT CURRENT PATHOLOGICAL FRACTURE: ICD-10-CM

## 2021-08-05 DIAGNOSIS — E78.5 DYSLIPIDEMIA: Chronic | ICD-10-CM

## 2021-08-05 DIAGNOSIS — E03.9 ACQUIRED HYPOTHYROIDISM: ICD-10-CM

## 2021-08-05 DIAGNOSIS — I10 ESSENTIAL HYPERTENSION: ICD-10-CM

## 2021-08-05 DIAGNOSIS — I71.40 ABDOMINAL AORTIC ANEURYSM (AAA) WITHOUT RUPTURE: ICD-10-CM

## 2021-08-05 PROCEDURE — 99443 PR PHYSICIAN TELEPHONE EVALUATION 21-30 MIN: ICD-10-PCS | Mod: 95,,, | Performed by: INTERNAL MEDICINE

## 2021-08-05 PROCEDURE — 99443 PR PHYSICIAN TELEPHONE EVALUATION 21-30 MIN: CPT | Mod: 95,,, | Performed by: INTERNAL MEDICINE

## 2021-08-05 RX ORDER — DOXYCYCLINE 100 MG/1
100 CAPSULE ORAL 2 TIMES DAILY WITH MEALS
Qty: 14 CAPSULE | Refills: 0 | Status: SHIPPED | OUTPATIENT
Start: 2021-08-05 | End: 2021-08-12

## 2021-08-31 ENCOUNTER — EXTERNAL CHRONIC CARE MANAGEMENT (OUTPATIENT)
Dept: PRIMARY CARE CLINIC | Facility: CLINIC | Age: 81
End: 2021-08-31
Payer: MEDICARE

## 2021-08-31 PROCEDURE — 99490 CHRNC CARE MGMT STAFF 1ST 20: CPT | Mod: S$PBB,,, | Performed by: FAMILY MEDICINE

## 2021-08-31 PROCEDURE — 99490 CHRNC CARE MGMT STAFF 1ST 20: CPT | Mod: PBBFAC,PO | Performed by: FAMILY MEDICINE

## 2021-08-31 PROCEDURE — 99490 PR CHRONIC CARE MGMT, 1ST 20 MIN: ICD-10-PCS | Mod: S$PBB,,, | Performed by: FAMILY MEDICINE

## 2021-09-30 ENCOUNTER — EXTERNAL CHRONIC CARE MANAGEMENT (OUTPATIENT)
Dept: PRIMARY CARE CLINIC | Facility: CLINIC | Age: 81
End: 2021-09-30
Payer: MEDICARE

## 2021-09-30 PROCEDURE — 99490 CHRNC CARE MGMT STAFF 1ST 20: CPT | Mod: S$PBB,,, | Performed by: FAMILY MEDICINE

## 2021-09-30 PROCEDURE — 99490 PR CHRONIC CARE MGMT, 1ST 20 MIN: ICD-10-PCS | Mod: S$PBB,,, | Performed by: FAMILY MEDICINE

## 2021-09-30 PROCEDURE — 99490 CHRNC CARE MGMT STAFF 1ST 20: CPT | Mod: PBBFAC,PO | Performed by: FAMILY MEDICINE

## 2021-10-25 ENCOUNTER — OFFICE VISIT (OUTPATIENT)
Dept: FAMILY MEDICINE | Facility: CLINIC | Age: 81
End: 2021-10-25
Payer: MEDICARE

## 2021-10-25 VITALS
HEART RATE: 94 BPM | HEIGHT: 61 IN | WEIGHT: 129 LBS | SYSTOLIC BLOOD PRESSURE: 139 MMHG | TEMPERATURE: 98 F | BODY MASS INDEX: 24.35 KG/M2 | DIASTOLIC BLOOD PRESSURE: 72 MMHG

## 2021-10-25 DIAGNOSIS — J40 BRONCHITIS: Primary | ICD-10-CM

## 2021-10-25 DIAGNOSIS — Z09 ENCOUNTER FOR EXAMINATION FOLLOWING TREATMENT AT HOSPITAL: ICD-10-CM

## 2021-10-25 DIAGNOSIS — J04.0 LARYNGITIS: ICD-10-CM

## 2021-10-25 PROCEDURE — 99214 OFFICE O/P EST MOD 30 MIN: CPT | Mod: PBBFAC,PO | Performed by: NURSE PRACTITIONER

## 2021-10-25 PROCEDURE — 99999 PR PBB SHADOW E&M-EST. PATIENT-LVL IV: ICD-10-PCS | Mod: PBBFAC,,, | Performed by: NURSE PRACTITIONER

## 2021-10-25 PROCEDURE — 99213 OFFICE O/P EST LOW 20 MIN: CPT | Mod: S$PBB,,, | Performed by: NURSE PRACTITIONER

## 2021-10-25 PROCEDURE — 99213 PR OFFICE/OUTPT VISIT, EST, LEVL III, 20-29 MIN: ICD-10-PCS | Mod: S$PBB,,, | Performed by: NURSE PRACTITIONER

## 2021-10-25 PROCEDURE — 99999 PR PBB SHADOW E&M-EST. PATIENT-LVL IV: CPT | Mod: PBBFAC,,, | Performed by: NURSE PRACTITIONER

## 2021-10-25 RX ORDER — PREDNISONE 10 MG/1
10 TABLET ORAL DAILY
Qty: 5 TABLET | Refills: 0 | Status: SHIPPED | OUTPATIENT
Start: 2021-10-25 | End: 2022-01-13

## 2021-10-25 RX ORDER — LEVOFLOXACIN 750 MG/1
750 TABLET ORAL DAILY
Qty: 10 TABLET | Refills: 0 | Status: CANCELLED | OUTPATIENT
Start: 2021-10-25

## 2021-10-25 RX ORDER — LEVOFLOXACIN 750 MG/1
750 TABLET ORAL DAILY
Qty: 7 TABLET | Refills: 0 | Status: SHIPPED | OUTPATIENT
Start: 2021-10-25 | End: 2022-01-13

## 2022-01-13 ENCOUNTER — HOSPITAL ENCOUNTER (OUTPATIENT)
Dept: RADIOLOGY | Facility: HOSPITAL | Age: 82
Discharge: HOME | End: 2022-01-13
Attending: NURSE PRACTITIONER
Payer: MEDICARE

## 2022-01-13 ENCOUNTER — OFFICE VISIT (OUTPATIENT)
Dept: FAMILY MEDICINE | Facility: CLINIC | Age: 82
End: 2022-01-13
Payer: MEDICARE

## 2022-01-13 ENCOUNTER — TELEPHONE (OUTPATIENT)
Dept: FAMILY MEDICINE | Facility: CLINIC | Age: 82
End: 2022-01-13

## 2022-01-13 VITALS
SYSTOLIC BLOOD PRESSURE: 138 MMHG | HEIGHT: 61 IN | BODY MASS INDEX: 23.45 KG/M2 | TEMPERATURE: 98 F | WEIGHT: 124.19 LBS | DIASTOLIC BLOOD PRESSURE: 64 MMHG

## 2022-01-13 DIAGNOSIS — M79.672 LEFT FOOT PAIN: Primary | ICD-10-CM

## 2022-01-13 DIAGNOSIS — M79.672 LEFT FOOT PAIN: ICD-10-CM

## 2022-01-13 PROCEDURE — 99213 OFFICE O/P EST LOW 20 MIN: CPT | Mod: S$PBB,,, | Performed by: NURSE PRACTITIONER

## 2022-01-13 PROCEDURE — 99999 PR PBB SHADOW E&M-EST. PATIENT-LVL IV: CPT | Mod: PBBFAC,,, | Performed by: NURSE PRACTITIONER

## 2022-01-13 PROCEDURE — 99213 PR OFFICE/OUTPT VISIT, EST, LEVL III, 20-29 MIN: ICD-10-PCS | Mod: S$PBB,,, | Performed by: NURSE PRACTITIONER

## 2022-01-13 PROCEDURE — 99999 PR PBB SHADOW E&M-EST. PATIENT-LVL IV: ICD-10-PCS | Mod: PBBFAC,,, | Performed by: NURSE PRACTITIONER

## 2022-01-13 PROCEDURE — 73630 X-RAY EXAM OF FOOT: CPT | Mod: 26,LT,, | Performed by: RADIOLOGY

## 2022-01-13 PROCEDURE — 73630 X-RAY EXAM OF FOOT: CPT | Mod: TC,PO,LT

## 2022-01-13 PROCEDURE — 99214 OFFICE O/P EST MOD 30 MIN: CPT | Mod: PBBFAC,PO | Performed by: NURSE PRACTITIONER

## 2022-01-13 PROCEDURE — 73630 XR FOOT COMPLETE 3 VIEW LEFT: ICD-10-PCS | Mod: 26,LT,, | Performed by: RADIOLOGY

## 2022-01-13 RX ORDER — ETODOLAC 400 MG/1
400 TABLET, FILM COATED ORAL 2 TIMES DAILY PRN
Qty: 20 TABLET | Refills: 0 | Status: SHIPPED | OUTPATIENT
Start: 2022-01-13 | End: 2022-02-16

## 2022-01-13 RX ORDER — NAPROXEN 375 MG/1
375 TABLET ORAL 2 TIMES DAILY PRN
Qty: 20 TABLET | Refills: 0 | Status: SHIPPED | OUTPATIENT
Start: 2022-01-13 | End: 2022-01-13 | Stop reason: ALTCHOICE

## 2022-01-13 NOTE — PROGRESS NOTES
Assessment/Plan:  Problem List Items Addressed This Visit        Orthopedic    Left foot pain - Primary    Overview     Patient is in clinic today as an established patient here for left foot pain. Symptom onset began a few weeks ago. The pain is located to the plantar surface of central forefoot. The pain has been gradually worsening. Worse with weight bearing activity. Described as sore. She has had no known injury, falls, puncture wounds. She has not tried taking anything. Mild relief with rest. She has complete ROM to left foot. Obtain x-ray to rule out significant concerns. Trial of Lodine as prescribed. Supportive care- rest, ice, heat, avoid heavy lifting, limit strenuous activity. Follow up with PCP if no improvement in symptoms.         Relevant Medications    etodolac (LODINE) 400 MG tablet    Other Relevant Orders    X-Ray Foot Complete Left (Completed)        Follow up in about 1 week (around 1/20/2022), or if symptoms worsen or fail to improve, for Follow up with PCP.    Farida Calderon NP  _____________________________________________________________________________________________________________________________________________________    CC: back pain     HPI: Patient is in clinic today as an established patient here for left foot pain. Symptom onset began a few weeks ago. The pain is located to the plantar surface of central forefoot. The pain has been gradually worsening. Worse with weight bearing activity. Described as sore. She has had no known injury, falls, puncture wounds. She has not tried taking anything. Mild relief with rest. She has complete ROM to left foot.     Past Medical History:  Past Medical History:   Diagnosis Date    Abdominal aortic aneurysm     Bile duct obstruction     per pt/ has seen MD    Cancer of upper lobe of right lung 2/26/2018    She had a resection of her right upper lobe due to a cancer at [facility] and she reports that the nodes were all negative.    She is not needing chemo or radiation.     Cataract     CKD (chronic kidney disease) stage 3, GFR 30-59 ml/min     Dr. Philippe    Colon polyp     hyperplastic     COPD (chronic obstructive pulmonary disease)     COPD with acute exacerbation     The patient presents with COPD.  The patient denies chest pain, palpitations, shortness of breath, difficulty breathing, and wheezing.  The patient feels that the pattern of symptoms is stable.  Current treatment has included albuterol inhaler.       Diplopia     DJD (degenerative joint disease), lumbar     HTN (hypertension)     Hyperlipidemia     Hypothyroidism     Lung cancer 2018    right    Obstruction of kidney     per pt/ pt does not know which kidney/ pt has appt to see nephrologist on 7/3    Osteopenia     Posterior vitreous detachment     Posterior vitreous detachment of right eye     Thyroiditis     positive thyroperoxidase antibodies    Trochanteric bursitis      Past Surgical History:   Procedure Laterality Date    CARPAL TUNNEL RELEASE      left     SECTION, CLASSIC      x2    ESOPHAGOGASTRODUODENOSCOPY Left 2019    Procedure: EGD (ESOPHAGOGASTRODUODENOSCOPY);  Surgeon: Nicola Her MD;  Location: [facility];  Service: Endoscopy;  Laterality: Left;    LUNG REMOVAL, PARTIAL Right     [facility]     Review of patient's allergies indicates:  No Known Allergies  Social History     Tobacco Use    Smoking status: Former Smoker     Packs/day: 1.00     Years: 45.00     Pack years: 45.00     Types: Cigarettes     Quit date: 2007     Years since quitting: 15.0    Smokeless tobacco: Never Used   Substance Use Topics    Alcohol use: No    Drug use: No     Family History   Problem Relation Age of Onset    Coronary artery disease Mother     Diabetes Mother     Amblyopia Neg Hx     Blindness Neg Hx     Cancer Neg Hx     Cataracts Neg Hx     Glaucoma Neg Hx     Hypertension Neg Hx     Macular degeneration  Neg Hx     Retinal detachment Neg Hx     Strabismus Neg Hx     Stroke Neg Hx     Thyroid disease Neg Hx      Current Outpatient Medications on File Prior to Visit   Medication Sig Dispense Refill    albuterol (ACCUNEB) 0.63 mg/3 mL Nebu Take 3 mLs (0.63 mg total) by nebulization 4 (four) times daily. 360 vial 3    calcium carbonate (OS-PAPITO) 500 mg calcium (1,250 mg) tablet Take 1 tablet by mouth once daily.       clotrimazole-betamethasone 1-0.05% (LOTRISONE) cream apply topically twice daily as needed for itching      diltiaZEM (CARDIZEM CD) 240 MG 24 hr capsule TAKE 1 CAPSULE BY MOUTH 2 TIMES DAILY. 180 capsule 0    docusate sodium (COLACE) 100 MG capsule Take 1 capsule (100 mg total) by mouth 2 (two) times daily.  0    fish oil-omega-3 fatty acids 300-1,000 mg capsule Take 1 capsule by mouth once daily.       fluticasone propionate (FLONASE) 50 mcg/actuation nasal spray 2 sprays (100 mcg total) by Each Nostril route once daily. 16 g 11    guaiFENesin (MUCINEX) 600 mg 12 hr tablet Take 1 tablet (600 mg total) by mouth 2 (two) times daily. 20 tablet 0    ipratropium (ATROVENT) 0.02 % nebulizer solution Take 500 mcg by nebulization 4 (four) times daily.       levothyroxine (SYNTHROID) 75 MCG tablet Take 1 tablet (75 mcg total) by mouth once daily. 90 tablet 3    multivitamin (THERAGRAN) per tablet Take 1 tablet by mouth once daily.      rosuvastatin (CRESTOR) 40 MG Tab TAKE 1 TABLET BY MOUTH ONCE DAILY 90 tablet 0    budesonide-formoterol 160-4.5 mcg (SYMBICORT) 160-4.5 mcg/actuation HFAA Inhale 2 puffs into the lungs every 12 (twelve) hours. Controller 1 Inhaler 2    ondansetron (ZOFRAN-ODT) 4 MG TbDL       triamcinolone acetonide 0.5% (KENALOG) 0.5 % Crea Apply topically 2 (two) times daily. for 10 days 30 g 0    [DISCONTINUED] levoFLOXacin (LEVAQUIN) 750 MG tablet Take 1 tablet (750 mg total) by mouth once daily. (Patient not taking: Reported on 1/13/2022) 7 tablet 0    [DISCONTINUED]  "predniSONE (DELTASONE) 10 MG tablet Take 1 tablet (10 mg total) by mouth once daily. (Patient not taking: Reported on 1/13/2022) 5 tablet 0     No current facility-administered medications on file prior to visit.     Review of Systems   Constitutional: Negative for appetite change, chills, fatigue and fever.   HENT: Negative for congestion, rhinorrhea and sore throat.    Eyes: Negative for visual disturbance.   Respiratory: Negative for cough and shortness of breath.    Cardiovascular: Negative for chest pain, palpitations and leg swelling.   Gastrointestinal: Negative for abdominal pain, diarrhea and vomiting.   Genitourinary: Negative for difficulty urinating, dysuria and hematuria.   Musculoskeletal: Positive for arthralgias and gait problem. Negative for myalgias.   Skin: Negative for color change, rash and wound.   Neurological: Negative for dizziness and headaches.   Psychiatric/Behavioral: Negative for behavioral problems. The patient is not nervous/anxious.      Vitals:    01/13/22 0859   BP: 138/64   BP Location: Left arm   Patient Position: Sitting   Temp: 97.7 °F (36.5 °C)   TempSrc: Oral   Weight: 56.3 kg (124 lb 3.2 oz)   Height: 5' 1" (1.549 m)     Wt Readings from Last 3 Encounters:   01/13/22 56.3 kg (124 lb 3.2 oz)   10/25/21 58.5 kg (129 lb)   06/25/21 61.1 kg (134 lb 11.2 oz)     Physical Exam  Vitals reviewed.   Constitutional:       General: She is not in acute distress.     Appearance: Normal appearance. She is not ill-appearing.   HENT:      Head: Normocephalic and atraumatic.      Right Ear: External ear normal.      Left Ear: External ear normal.   Eyes:      Extraocular Movements: Extraocular movements intact.      Conjunctiva/sclera: Conjunctivae normal.   Cardiovascular:      Rate and Rhythm: Normal rate.      Heart sounds: Normal heart sounds.   Pulmonary:      Effort: Pulmonary effort is normal. No respiratory distress.      Breath sounds: Normal breath sounds.   Abdominal:      " General: Abdomen is flat. There is no distension.   Musculoskeletal:      Cervical back: Normal range of motion.      Right foot: Normal.      Left foot: Normal range of motion and normal capillary refill. Tenderness present. No swelling or deformity. Normal pulse.        Feet:    Feet:      Left foot:      Skin integrity: Skin integrity normal. No ulcer, blister, skin breakdown, erythema or warmth.      Toenail Condition: Left toenails are normal.   Skin:     General: Skin is warm and dry.      Capillary Refill: Capillary refill takes less than 2 seconds.   Neurological:      Mental Status: She is alert and oriented to person, place, and time. Mental status is at baseline.   Psychiatric:         Mood and Affect: Mood normal.         Behavior: Behavior normal.       Health Maintenance   Topic Date Due    Colonoscopy  06/19/2019    DEXA SCAN  01/06/2022    Lipid Panel  01/26/2022    TETANUS VACCINE  12/14/2025

## 2022-01-13 NOTE — TELEPHONE ENCOUNTER
----- Message from Farida Calderon NP sent at 1/13/2022 10:22 AM CST -----  Contact: Canide with [facility] @ [phone]  Cancel Naproxen. I have sent in Lodine instead. Please notify patient and update pharmacy.     ----- Message -----  From: Brianna Hall  Sent: 1/13/2022  10:03 AM CST  To: [facility] Staff    Pharmacy is calling to clarify an RX.  RX name:  Naproxen (EC-NAPROSYN) 375 MG TbEC EC   What do they need to clarify:  They do not have this and it may need a PA. Can they change it to Plan?   Comments:

## 2022-01-17 DIAGNOSIS — I47.19 ECTOPIC ATRIAL TACHYCARDIA: ICD-10-CM

## 2022-01-17 DIAGNOSIS — E78.5 DYSLIPIDEMIA: Chronic | ICD-10-CM

## 2022-01-17 DIAGNOSIS — I10 ESSENTIAL HYPERTENSION: ICD-10-CM

## 2022-01-17 NOTE — TELEPHONE ENCOUNTER
Care Due:                  Date            Visit Type   Department     Provider  --------------------------------------------------------------------------------                                [facility] [place] [facility]  Last Visit: 04-      FOLLOW UP    MEDICINE       Joseluis Norman                                           [facility]  Next Visit: 02-      None         MEDICINE       Joseluis Norman                                                            Last  Test          Frequency    Reason                     Performed    Due Date  --------------------------------------------------------------------------------    CMP.........  12 months..  rosuvastatin.............  Not Found    Overdue    Lipid Panel.  12 months..  rosuvastatin.............  Not Found    Overdue    Powered by [employer]. Reference number: [id number].   1/17/2022 4:38:16 PM CST

## 2022-01-17 NOTE — TELEPHONE ENCOUNTER
No new care gaps identified.  Powered by [employer] by [employer]. Reference number: [id number].   1/17/2022 4:39:03 PM CST

## 2022-01-21 DIAGNOSIS — I10 ESSENTIAL HYPERTENSION: ICD-10-CM

## 2022-01-21 DIAGNOSIS — I47.19 ECTOPIC ATRIAL TACHYCARDIA: ICD-10-CM

## 2022-01-21 RX ORDER — DILTIAZEM HYDROCHLORIDE 240 MG/1
CAPSULE, COATED, EXTENDED RELEASE ORAL
Qty: 180 CAPSULE | Refills: 0 | Status: SHIPPED | OUTPATIENT
Start: 2022-01-21 | End: 2022-04-28

## 2022-01-21 NOTE — TELEPHONE ENCOUNTER
No new care gaps identified.  Powered by [employer] by [employer]. Reference number: [id number].   1/21/2022 8:34:45 AM CST

## 2022-01-21 NOTE — TELEPHONE ENCOUNTER
----- Message from Sima Chang sent at 1/20/2022  3:56 PM CST -----  Contact: self  Type:  RX Refill Request    Who Called: Gloria SALAMANCA [place]  Refill or New Rx: refill   RX Name and Strength: diltiaZEM (CARDIZEM CD) 240 MG ,   How is the patient currently taking it? (ex. 1XDay): 2X   Is this a 30 day or 90 day RX: 90 day   Preferred Pharmacy with phone number:   [facility] [place], [place] - [address] [address]  [address] [address] [address]  Phone: [phone] Fax: [phone]  Local or Mail Order: local   Ordering Provider: Ester   Would the patient rather a call back or a response via [facility]? Call back   Best Call Back Number: [phone]  Additional Information: patient states that the medications to be approved for more refills         Type:  RX Refill Request    Who Called: Gloria SALAMANCA [place]  Refill or New Rx: refill   RX Name and Strength: rosuvastatin (CRESTOR) 40 MG   How is the patient currently taking it? (ex. 1XDay): 1X   Is this a 30 day or 90 day RX: 90 day   Preferred Pharmacy with phone number:   [facility] [place], [place] - [address]  [address]  [facility] [address]  Phone: [phone] Fax: [phone]  Local or Mail Order: local   Ordering Provider: Ester   Would the patient rather a call back or a response via [facility]? Call back   Best Call Back Number: [phone]  Additional Information: patient states that the medications to be approved for more refills

## 2022-01-27 DIAGNOSIS — E78.5 DYSLIPIDEMIA: Chronic | ICD-10-CM

## 2022-01-27 NOTE — TELEPHONE ENCOUNTER
No new care gaps identified.  Powered by [employer] by [employer]. Reference number: [id number].   1/27/2022 4:32:17 PM CST

## 2022-01-27 NOTE — TELEPHONE ENCOUNTER
----- Message from Jyoti Calderon sent at 1/27/2022  4:11 PM CST -----  Pt is requesting a call back. Pt didn't want to state the reason for the call. Pt can be reached at [phone] (foze)

## 2022-01-28 RX ORDER — ROSUVASTATIN CALCIUM 40 MG/1
40 TABLET, COATED ORAL DAILY
Qty: 90 TABLET | Refills: 0 | Status: SHIPPED | OUTPATIENT
Start: 2022-01-28 | End: 2022-04-28

## 2022-01-28 NOTE — TELEPHONE ENCOUNTER
I refilled the requested medication x 1 month.  The patient is due for a visit.  Call the patient on the phone and book the patient with Jenna Cobb NP.    PLEASE DOCUMENT THE FACT THAT YOU HAVE CONTACTED THE PATIENT IN THE CHART FOR FUTURE REFERENCE.    Health Maintenance Due   Topic Date Due    Shingles Vaccine (1 of 2) Never done    Colonoscopy  06/19/2019    Influenza Vaccine (1) 09/01/2021    Lipid Panel  01/26/2022    DEXA SCAN  01/06/2022

## 2022-02-02 RX ORDER — ROSUVASTATIN CALCIUM 40 MG/1
TABLET, COATED ORAL
Qty: 90 TABLET | Refills: 0 | OUTPATIENT
Start: 2022-02-02

## 2022-02-02 RX ORDER — DILTIAZEM HYDROCHLORIDE 240 MG/1
CAPSULE, COATED, EXTENDED RELEASE ORAL
Qty: 180 CAPSULE | Refills: 0 | OUTPATIENT
Start: 2022-02-02

## 2022-02-10 ENCOUNTER — TELEPHONE (OUTPATIENT)
Dept: FAMILY MEDICINE | Facility: CLINIC | Age: 82
End: 2022-02-10
Payer: COMMERCIAL

## 2022-02-10 DIAGNOSIS — Z12.31 VISIT FOR SCREENING MAMMOGRAM: Primary | ICD-10-CM

## 2022-02-10 NOTE — TELEPHONE ENCOUNTER
----- Message from Jyoti Calderon sent at 2/10/2022  9:11 AM CST -----  Pt need orders in for Mammo. Pt can be reached at [phone] (fjes)

## 2022-02-16 ENCOUNTER — HOSPITAL ENCOUNTER (OUTPATIENT)
Dept: RADIOLOGY | Facility: HOSPITAL | Age: 82
Discharge: HOME | End: 2022-02-16
Attending: FAMILY MEDICINE
Payer: MEDICARE

## 2022-02-16 ENCOUNTER — OFFICE VISIT (OUTPATIENT)
Dept: FAMILY MEDICINE | Facility: CLINIC | Age: 82
End: 2022-02-16
Payer: MEDICARE

## 2022-02-16 VITALS
HEART RATE: 74 BPM | SYSTOLIC BLOOD PRESSURE: 136 MMHG | BODY MASS INDEX: 23.41 KG/M2 | HEIGHT: 61 IN | DIASTOLIC BLOOD PRESSURE: 60 MMHG | HEIGHT: 61 IN | TEMPERATURE: 98 F | WEIGHT: 124 LBS | WEIGHT: 121 LBS | BODY MASS INDEX: 22.84 KG/M2

## 2022-02-16 DIAGNOSIS — E11.22 TYPE 2 DIABETES MELLITUS WITH STAGE 3A CHRONIC KIDNEY DISEASE, WITHOUT LONG-TERM CURRENT USE OF INSULIN: ICD-10-CM

## 2022-02-16 DIAGNOSIS — N18.31 STAGE 3A CHRONIC KIDNEY DISEASE: ICD-10-CM

## 2022-02-16 DIAGNOSIS — N18.31 TYPE 2 DIABETES MELLITUS WITH STAGE 3A CHRONIC KIDNEY DISEASE, WITHOUT LONG-TERM CURRENT USE OF INSULIN: ICD-10-CM

## 2022-02-16 DIAGNOSIS — R63.4 WEIGHT LOSS: Primary | ICD-10-CM

## 2022-02-16 DIAGNOSIS — Z12.31 VISIT FOR SCREENING MAMMOGRAM: ICD-10-CM

## 2022-02-16 DIAGNOSIS — I71.40 ABDOMINAL AORTIC ANEURYSM (AAA) WITHOUT RUPTURE: ICD-10-CM

## 2022-02-16 DIAGNOSIS — R13.19 ESOPHAGEAL DYSPHAGIA: ICD-10-CM

## 2022-02-16 DIAGNOSIS — M79.672 LEFT FOOT PAIN: ICD-10-CM

## 2022-02-16 DIAGNOSIS — I47.19 ECTOPIC ATRIAL TACHYCARDIA: ICD-10-CM

## 2022-02-16 DIAGNOSIS — J41.8 MIXED SIMPLE AND MUCOPURULENT CHRONIC BRONCHITIS: ICD-10-CM

## 2022-02-16 DIAGNOSIS — C34.11 MALIGNANT NEOPLASM OF UPPER LOBE OF RIGHT LUNG: ICD-10-CM

## 2022-02-16 LAB
BACTERIA #/AREA URNS HPF: ABNORMAL /HPF
BILIRUB UR QL STRIP: ABNORMAL
CLARITY UR: CLEAR
COLOR UR: YELLOW
GLUCOSE UR QL STRIP: NEGATIVE
HGB UR QL STRIP: ABNORMAL
HYALINE CASTS #/AREA URNS LPF: 3 /LPF
KETONES UR QL STRIP: NEGATIVE
LEUKOCYTE ESTERASE UR QL STRIP: NEGATIVE
MICROSCOPIC COMMENT: ABNORMAL
NITRITE UR QL STRIP: NEGATIVE
PH UR STRIP: 6 [PH] (ref 5–8)
PROT UR QL STRIP: ABNORMAL
RBC #/AREA URNS HPF: 1 /HPF (ref 0–4)
SP GR UR STRIP: 1.02 (ref 1–1.03)
SQUAMOUS #/AREA URNS HPF: 3 /HPF
URN SPEC COLLECT METH UR: ABNORMAL
WBC #/AREA URNS HPF: 1 /HPF (ref 0–5)

## 2022-02-16 PROCEDURE — 77063 BREAST TOMOSYNTHESIS BI: CPT | Mod: TC,PO

## 2022-02-16 PROCEDURE — 99215 OFFICE O/P EST HI 40 MIN: CPT | Mod: PBBFAC,PO | Performed by: FAMILY MEDICINE

## 2022-02-16 PROCEDURE — 81000 URINALYSIS NONAUTO W/SCOPE: CPT | Mod: PO | Performed by: FAMILY MEDICINE

## 2022-02-16 PROCEDURE — 77063 BREAST TOMOSYNTHESIS BI: CPT | Mod: 26,,, | Performed by: RADIOLOGY

## 2022-02-16 PROCEDURE — 99999 PR PBB SHADOW E&M-EST. PATIENT-LVL V: ICD-10-PCS | Mod: PBBFAC,,, | Performed by: FAMILY MEDICINE

## 2022-02-16 PROCEDURE — 99214 OFFICE O/P EST MOD 30 MIN: CPT | Mod: S$PBB,,, | Performed by: FAMILY MEDICINE

## 2022-02-16 PROCEDURE — 99999 PR PBB SHADOW E&M-EST. PATIENT-LVL V: CPT | Mod: PBBFAC,,, | Performed by: FAMILY MEDICINE

## 2022-02-16 PROCEDURE — 99214 PR OFFICE/OUTPT VISIT, EST, LEVL IV, 30-39 MIN: ICD-10-PCS | Mod: S$PBB,,, | Performed by: FAMILY MEDICINE

## 2022-02-16 PROCEDURE — 77067 MAMMO DIGITAL SCREENING BILAT WITH TOMO: ICD-10-PCS | Mod: 26,,, | Performed by: RADIOLOGY

## 2022-02-16 PROCEDURE — 77067 SCR MAMMO BI INCL CAD: CPT | Mod: 26,,, | Performed by: RADIOLOGY

## 2022-02-16 PROCEDURE — 77067 SCR MAMMO BI INCL CAD: CPT | Mod: TC,PO

## 2022-02-16 PROCEDURE — 77063 MAMMO DIGITAL SCREENING BILAT WITH TOMO: ICD-10-PCS | Mod: 26,,, | Performed by: RADIOLOGY

## 2022-02-16 RX ORDER — ETODOLAC 400 MG/1
400 TABLET, FILM COATED ORAL 2 TIMES DAILY PRN
Qty: 20 TABLET | Refills: 11 | Status: SHIPPED | OUTPATIENT
Start: 2022-02-16

## 2022-02-16 RX ORDER — MEGESTROL ACETATE 40 MG/ML
200 SUSPENSION ORAL DAILY
Qty: 150 ML | Refills: 11 | Status: SHIPPED | OUTPATIENT
Start: 2022-02-16 | End: 2022-02-21

## 2022-02-16 NOTE — PROGRESS NOTES
Subjective:      Patient ID: Gloria Hale is a 81 y.o. female.    Chief Complaint: Follow-up (Foot pain )    Problem List Items Addressed This Visit     Aortic aneurysm    Overview     She has had an abdominal aortic aneurysm.   She has had this tracked.     Narrative     Findings: Comparison 9 [phone]. Stable fusiform infrarenal abdominal aortic aneurysm measuring 2.9 x 2.7 cm in diameter 3.3 cm in length. Arterial sclerotic disease is again noted. Proximal aorta measured 2.2 x 2.5 cm. Mid aorta measured 1.9 x 1.7 cm. Both common iliac arteries are 10 millimeters in diameter.   Impression      Stable exam.      Electronically signed by: SAUNDRA TELLEZ MD  Date: 10/05/16  Time: 14:35               CKD (chronic kidney disease) stage 3, GFR 30-59 ml/min    Overview     Gloria Hale has an Estimated Glumerular Filtration Rate (EGFR) between 30 and 59 consistent with the definition of chronic kidney disease stage 3.    Reviewed last labs at [facility].    Lab Results   Component Value Date    CREATININE 1.2 01/26/2021    BUN 14 01/26/2021     01/26/2021    K 3.6 01/26/2021     01/26/2021    CO2 25 01/26/2021       The patient's chronic kidney disease stage 3 was monitored, evaluated, addressed and/or treated.             COPD (chronic obstructive pulmonary disease)    Overview     The patient presents with COPD.  The patient denies chest pain, palpitations, shortness of breath, difficulty breathing, and wheezing.  The patient feels that the pattern of symptoms is stable.  Current treatment has included the following medications:    Your Quick Relief Medication: (7000h ago through 1000h from now)        Stop Sig     albuterol (ACCUNEB) 0.63 mg/3 mL Nebu  4 times daily        Sig: Take 3 mLs (0.63 mg total) by nebulization 4 (four) times daily.        -- Take 3 mLs (0.63 mg total) by nebulization 4 (four) times daily.         Your Controller Medications: (7000h ago through 1000h from now)        Stop Sig      budesonide-formoterol 160-4.5 mcg (SYMBICORT) 160-4.5 mcg/actuation HFAA  Every 12 hours        Sig: Inhale 2 puffs into the lungs every 12 (twelve) hours. Controller        06/25 9741 Inhale 2 puffs into the lungs every 12 (twelve) hours. Controller                         Ectopic atrial tachycardia    Overview     Holter monitor - 24 hour   Order: [id number]   Status:  Final result Visible to patient:  No (Not Released) Next appt:  10/24/2016 at 01:20 PM in Cardiology (Siena Holliday NP) Dx:  Palpitations; SOB (shortness of breat...   Narrative   Date of Procedure: 10/13/2016    PRE-TEST DATA   The diary was not returned.        TEST DESCRIPTION   PREDOMINANT RHYTHM  1. Sinus rhythm with heart rates varying between 48 and 124 bpm with an average of 73 bpm.     VENTRICULAR ARRHYTHMIAS  1. There were very rare polymorphic PVCs recorded totalling 2 and averaging less than 1 per hour.     2. There were no episodes of ventricular tachycardia.    SUPRA VENTRICULAR ARRHYTHMIAS  1. There were occasional PACs totalling 456 and averaging 19 per hour.     2. There were 3 non-sustained runs of EAT lasting from 3 to 4 beats.     SINUS NODE FUNCTION  1. There was no evidence of high grade SA misti block.     AV CONDUCTION  1. There was no evidence of high grade AV block.     DIARY  1. The diary was not returned    MISCELLANEOUS  1. This was a tape of adequate length (24 hrs).             This document has been electronically    SIGNED BY: Wolf Gonzales MD On: 10/17/2016 15:38                    Left foot pain    Overview     Patient is in clinic today as an established patient here for left foot pain. Symptom onset began a few weeks ago. The pain is located to the plantar surface of central forefoot. The pain has been gradually worsening. Worse with weight bearing activity. Described as sore. She has had no known injury, falls, puncture wounds. She has not tried taking anything. Mild relief with rest. She has complete ROM  to left foot. Obtain x-ray to rule out significant concerns. Trial of Lodine as prescribed. Supportive care- rest, ice, heat, avoid heavy lifting, limit strenuous activity. Follow up with PCP if no improvement in symptoms.         Relevant Medications    etodolac (LODINE) 400 MG tablet    Malignant neoplasm of right lung    Overview       Lung nodule/ adenocarcinoma of the lung-biopsy proven adenocarcinoma and status post resection of right upper lobe in November 2017. This would be deemed a stage I cancer. She had a negative CT last year with Dr. daley and she plans to continue following up with him.          Relevant Orders    NM PET Cu64 dotatate, skull to mid thigh    Type 2 diabetes mellitus with stage 3a chronic kidney disease, without long-term current use of insulin    Overview     The patient presents with diabetes.  The patient denies polyuria, polydipsia, polyphagia, hypoglycemia and paresthesias.  The patient's glucose control has been good.  Home glucose averages are routinely checked.  The patient is without retinopathy currently.  The patient has no history of neuropathy.  The patient currently complains of no podiatric problems.  The patient has excellent compliance.  Hemoglobin A1C   Date Value Ref Range Status   04/10/2021 6.6 (H) 4.6 - 6.2 % Final   01/06/2020 6.6 (H) 4.0 - 5.6 % Final     Comment:     ADA Screening Guidelines:  5.7-6.4%  Consistent with prediabetes  >or=6.5%  Consistent with diabetes  High levels of fetal hemoglobin interfere with the HbA1C  assay. Heterozygous hemoglobin variants (HbS, HgC, etc)do  not significantly interfere with this assay.   However, presence of multiple variants may affect accuracy.     07/22/2019 6.3 (H) 0.0 - 5.6 % Final     Comment:     Reference Interval:  5.0 - 5.6 Normal   5.7 - 6.4 High Risk   > 6.5 Diabetic    Hgb A1c results are standardized based on the (NGSP) National   Glycohemoglobin Standardization Program.    Hemoglobin A1C levels are related  to mean serum/plasma glucose   during the preceding 2-3 months.        11/02/2018 6.4 (H) 4.0 - 5.6 % Final     Comment:     ADA Screening Guidelines:  5.7-6.4%  Consistent with prediabetes  >or=6.5%  Consistent with diabetes  High levels of fetal hemoglobin interfere with the HbA1C  assay. Heterozygous hemoglobin variants (HbS, HgC, etc)do  not significantly interfere with this assay.   However, presence of multiple variants may affect accuracy.       No results found for: RUFINOALLENORA ECHOLS4HUR  Diabetes Management Status    Statin: Taking  ACE/ARB: Not taking    Screening or Prevention Patient's value Goal Complete/Controlled?   HgA1C Testing and Control   Lab Results   Component Value Date    HGBA1C 6.6 (H) 04/10/2021      Annually/Less than 8% Yes   Lipid profile : 01/26/2021 Annually No   LDL control Lab Results   Component Value Date    LDLCALC 54.0 (L) 01/26/2021    Annually/Less than 100 mg/dl  No   Nephropathy screening No results found for: LABMICR  Lab Results   Component Value Date    PROTEINUA 1+ (A) 06/17/2021     Lab Results   Component Value Date    UTPCR 0.92 (H) 05/05/2021      Annually Yes   Blood pressure BP Readings from Last 1 Encounters:   02/16/22 136/60    Less than 140/90 No   Dilated retinal exam Most Recent Eye Exam Date: Not Found Annually No   Foot exam   Most Recent Foot Exam Date: Not Found Annually No              Relevant Orders    Microalbumin/Creatinine Ratio, Urine    Hemoglobin A1C    Lipid Panel    Ambulatory referral/consult to Optometry    Weight loss - Primary    Overview     She has lost her taste for food.  She has trouble swallowing food now and she states that she has had someone go down her throat in the past.  She states that she has had a scan done recently and it was negative.  Wt Readings from Last 4 Encounters:   02/16/22 54.9 kg (121 lb)   01/13/22 56.3 kg (124 lb 3.2 oz)   10/25/21 58.5 kg (129 lb)   06/25/21 61.1 kg (134 lb 11.2 oz)     CT Chest WO  Contrast    Anatomical Region Laterality Modality   Chest -- Computed Tomography       Narrative  Performed by [facility]  REASON FOR EXAM: [R91.8]-Other nonspecific abnormal finding of lung field / Lung nodule, < 6mm, high cancer risk     TECHNICAL FACTORS: Multiple contiguous axial CT images were obtained of the chest without administration of intravenous contrast. Automated exposure control was utilized for radiation dose reduction.     COMPARISON: 10/11/2021     FINDINGS:     Soft tissues/Musculature: Unremarkable   Osseous Structures: Mild degenerative changes identified throughout the visualized spine. No acute osseous abnormality.   Thyroid: Unremarkable   Esophagus: Unremarkable   Upper abdomen: Partially visualized supra pancreatic 3.6 cm left superior renal pole cyst. Moderate abdominal aortic atherosclerotic disease.   Aorta and Great Vessels: Mild to moderate atherosclerosis. Normal caliber thoracic aorta.   Heart: Normal size heart. Moderate coronary arterial disease. No secondary pericardial effusion.   Mediastinum:Unremarkable   Lungs/Pleura: Postoperative changes of right upper lobectomy. Diffuse mild centrilobular emphysematous changes noted. Areas of groundglass opacity/nodularity identified in the superior segment of the right lower lobe, similar comparison to prior   examination. Subpleural reticular opacities identified along the anterolateral aspect of the right upper lobe on series 2, images 44-59, likely representing subpleural fibrotic changes. Few patchy regions of groundglass opacities are noted scattered   throughout the right upper lobe, similar prior examination. There is no lobar consolidation, pleural effusion, or pneumothorax. Trachea and mainstem bronchi are patent with possible bronchomalacia. There is bronchial thickening of the right greater than   left lower lungs, potentially representing bronchitis.     IMPRESSION:    1.  Persistent nodularities throughout the right  lower lobe, similar prior examination. Apical ground glass nodularity right midlung nodularities are similar in comparison to prior examinations. Findings are nonspecific and potentially represent   infectious/inflammatory versus neoplastic process.    2.  Bronchitis. Potential bronchomalacia.   3. Pulmonary emphysema.   4. Additional incidental, age-related, and/or chronic findings as described above.         Electronically signed by Casper Cloud MD on 11/5/2021 3:34 PM    Procedure Note    Casper Cloud MD - 11/05/2021   Formatting of this note might be different from the original.   REASON FOR EXAM: [R91.8]-Other nonspecific abnormal finding of lung field / Lung nodule, < 6mm, high cancer risk     TECHNICAL FACTORS: Multiple contiguous axial CT images were obtained of the chest without administration of intravenous contrast. Automated exposure control was utilized for radiation dose reduction.     COMPARISON: 10/11/2021     FINDINGS:     Soft tissues/Musculature: Unremarkable   Osseous Structures: Mild degenerative changes identified throughout the visualized spine. No acute osseous abnormality.   Thyroid: Unremarkable   Esophagus: Unremarkable   Upper abdomen: Partially visualized supra pancreatic 3.6 cm left superior renal pole cyst. Moderate abdominal aortic atherosclerotic disease.   Aorta and Great Vessels: Mild to moderate atherosclerosis. Normal caliber thoracic aorta.   Heart: Normal size heart. Moderate coronary arterial disease. No secondary pericardial effusion.   Mediastinum:Unremarkable   Lungs/Pleura: Postoperative changes of right upper lobectomy. Diffuse mild centrilobular emphysematous changes noted. Areas of groundglass opacity/nodularity identified in the superior segment of the right lower lobe, similar comparison to prior examination. Subpleural reticular opacities identified along the anterolateral aspect of the right upper lobe on series 2, images 44-59, likely representing subpleural  fibrotic changes. Few patchy regions of groundglass opacities are noted scattered throughout the right upper lobe, similar prior examination. There is no lobar consolidation, pleural effusion, or pneumothorax. Trachea and mainstem bronchi are patent with possible bronchomalacia. There is bronchial thickening of the right greater than left lower lungs, potentially representing bronchitis.     IMPRESSION:    1.  Persistent nodularities throughout the right lower lobe, similar prior examination. Apical ground glass nodularity right midlung nodularities are similar in comparison to prior examinations. Findings are nonspecific and potentially represent infectious/inflammatory versus neoplastic process.    2.  Bronchitis. Potential bronchomalacia.   3. Pulmonary emphysema.   4. Additional incidental, age-related, and/or chronic findings as described above.         Electronically signed by Casper Cloud MD on 11/5/2021 3:34 PM  Exam End: 11/05/21 12:08 PM    Specimen Collected: 11/05/21  3:07 PM Last Resulted: 11/05/21  3:34 PM   Received From: [facility]  Result Received: 01/13/22  8:51 AM    View Encounter                       Relevant Medications    megestroL (MEGACE) 400 mg/10 mL (40 mg/mL) Susp    Other Relevant Orders    NM PET Cu64 dotatate, skull to mid thigh    Ambulatory referral/consult to Gastroenterology    CBC Auto Differential    Comprehensive Metabolic Panel    TSH    Amylase    Lipase    Urinalysis, Reflex to Urine Culture Urine, Clean Catch    CT Abdomen Pelvis  Without Contrast      Other Visit Diagnoses     Esophageal dysphagia        Relevant Orders    NM PET Cu64 dotatate, skull to mid thigh    Ambulatory referral/consult to Gastroenterology    CT Abdomen Pelvis  Without Contrast          Past Medical History:  Past Medical History:   Diagnosis Date    Abdominal aortic aneurysm     Bile duct obstruction     per pt/ has seen MD    Cancer of upper lobe of right lung 2/26/2018    She had  a resection of her right upper lobe due to a cancer at [facility] and she reports that the nodes were all negative.   She is not needing chemo or radiation.     Cataract     CKD (chronic kidney disease) stage 3, GFR 30-59 ml/min     Dr. Philippe    Colon polyp     hyperplastic     COPD (chronic obstructive pulmonary disease)     COPD with acute exacerbation     The patient presents with COPD.  The patient denies chest pain, palpitations, shortness of breath, difficulty breathing, and wheezing.  The patient feels that the pattern of symptoms is stable.  Current treatment has included albuterol inhaler.       Diplopia     DJD (degenerative joint disease), lumbar     HTN (hypertension)     Hyperlipidemia     Hypothyroidism     Lung cancer 2018    right    Obstruction of kidney     per pt/ pt does not know which kidney/ pt has appt to see nephrologist on 7/3    Osteopenia     Posterior vitreous detachment     Posterior vitreous detachment of right eye     Thyroiditis     positive thyroperoxidase antibodies    Trochanteric bursitis      Past Surgical History:   Procedure Laterality Date    CARPAL TUNNEL RELEASE      left     SECTION, CLASSIC      x2    ESOPHAGOGASTRODUODENOSCOPY Left 2019    Procedure: EGD (ESOPHAGOGASTRODUODENOSCOPY);  Surgeon: Nicola Her MD;  Location: [facility];  Service: Endoscopy;  Laterality: Left;    LUNG REMOVAL, PARTIAL Right     [facility]     Review of patient's allergies indicates:  No Known Allergies  Current Outpatient Medications on File Prior to Visit   Medication Sig Dispense Refill    albuterol (ACCUNEB) 0.63 mg/3 mL Nebu Take 3 mLs (0.63 mg total) by nebulization 4 (four) times daily. 360 vial 3    calcium carbonate (OS-PAPITO) 500 mg calcium (1,250 mg) tablet Take 1 tablet by mouth once daily.       clotrimazole-betamethasone 1-0.05% (LOTRISONE) cream apply topically twice daily as needed for itching      diltiaZEM  (CARDIZEM CD) 240 MG 24 hr capsule TAKE 1 CAPSULE BY MOUTH 2 TIMES DAILY. 180 capsule 0    docusate sodium (COLACE) 100 MG capsule Take 1 capsule (100 mg total) by mouth 2 (two) times daily.  0    fish oil-omega-3 fatty acids 300-1,000 mg capsule Take 1 capsule by mouth once daily.       fluticasone propionate (FLONASE) 50 mcg/actuation nasal spray 2 sprays (100 mcg total) by Each Nostril route once daily. 16 g 11    guaiFENesin (MUCINEX) 600 mg 12 hr tablet Take 1 tablet (600 mg total) by mouth 2 (two) times daily. 20 tablet 0    ipratropium (ATROVENT) 0.02 % nebulizer solution Take 500 mcg by nebulization 4 (four) times daily.       levothyroxine (SYNTHROID) 75 MCG tablet Take 1 tablet (75 mcg total) by mouth once daily. 90 tablet 3    multivitamin (THERAGRAN) per tablet Take 1 tablet by mouth once daily.      rosuvastatin (CRESTOR) 40 MG Tab Take 1 tablet (40 mg total) by mouth once daily. 90 tablet 0    [DISCONTINUED] etodolac (LODINE) 400 MG tablet Take 1 tablet (400 mg total) by mouth 2 (two) times daily as needed (foot pain). 20 tablet 0    budesonide-formoterol 160-4.5 mcg (SYMBICORT) 160-4.5 mcg/actuation HFAA Inhale 2 puffs into the lungs every 12 (twelve) hours. Controller 1 Inhaler 2    ondansetron (ZOFRAN-ODT) 4 MG TbDL       triamcinolone acetonide 0.5% (KENALOG) 0.5 % Crea Apply topically 2 (two) times daily. for 10 days 30 g 0     No current facility-administered medications on file prior to visit.     Social History     Socioeconomic History    Marital status:      Spouse name: Ibrahima    Number of children: 2   Tobacco Use    Smoking status: Former Smoker     Packs/day: 1.00     Years: 45.00     Pack years: 45.00     Types: Cigarettes     Quit date: 1/1/2007     Years since quitting: 15.1    Smokeless tobacco: Never Used   Substance and Sexual Activity    Alcohol use: No    Drug use: No    Sexual activity: Never     Partners: Male   Social History Narrative    Lives alone  "    Family History   Problem Relation Age of Onset    Coronary artery disease Mother     Diabetes Mother     Amblyopia Neg Hx     Blindness Neg Hx     Cancer Neg Hx     Cataracts Neg Hx     Glaucoma Neg Hx     Hypertension Neg Hx     Macular degeneration Neg Hx     Retinal detachment Neg Hx     Strabismus Neg Hx     Stroke Neg Hx     Thyroid disease Neg Hx        Review of Systems    Objective:     /60   Pulse 74   Temp 97.7 °F (36.5 °C)   Ht 5' 1" (1.549 m)   Wt 54.9 kg (121 lb)   BMI 22.86 kg/m²     Physical Exam  Constitutional:       Appearance: Normal appearance. She is well-developed and well-nourished.   HENT:      Head: Normocephalic and atraumatic.      Right Ear: Tympanic membrane, ear canal and external ear normal.      Left Ear: Tympanic membrane, ear canal and external ear normal.      Nose: Nose normal.      Mouth/Throat:      Mouth: Mucous membranes are normal. No oral lesions.      Pharynx: Uvula midline. No oropharyngeal exudate, posterior oropharyngeal edema or posterior oropharyngeal erythema.   Eyes:      General: Lids are normal. No scleral icterus.        Right eye: No discharge.         Left eye: No discharge.      Extraocular Movements: EOM normal.      Right eye: No nystagmus.      Left eye: No nystagmus.      Conjunctiva/sclera:      Right eye: Right conjunctiva is not injected. No hemorrhage.     Left eye: Left conjunctiva is not injected. No hemorrhage.     Pupils: Pupils are equal, round, and reactive to light.   Neck:      Thyroid: No thyroid mass or thyromegaly.      Vascular: No carotid bruit or JVD.      Trachea: No tracheal tenderness or tracheal deviation.   Cardiovascular:      Rate and Rhythm: Normal rate and regular rhythm.      Pulses:           Carotid pulses are 2+ on the right side and 2+ on the left side.       Radial pulses are 2+ on the right side and 2+ on the left side.        Posterior tibial pulses are 2+ on the right side and 2+ on the left " side.      Heart sounds: S1 normal and S2 normal. Murmur heard.    Systolic murmur is present with a grade of 2/6.      Pulmonary:      Effort: Pulmonary effort is normal. No respiratory distress.      Breath sounds: Normal breath sounds. No wheezing, rhonchi or rales.   Abdominal:      General: Bowel sounds are normal. There is no distension or abdominal bruit. Aorta is normal.      Palpations: Abdomen is soft. There is no hepatosplenomegaly, mass or pulsatile mass.      Tenderness: There is no abdominal tenderness. There is no rebound.   Musculoskeletal:      Cervical back: Full passive range of motion without pain, normal range of motion and neck supple.      Right knee: No swelling. No tenderness.      Left knee: No swelling. No tenderness.   Lymphadenopathy:      Head:      Right side of head: No submental or submandibular adenopathy.      Left side of head: No submental or submandibular adenopathy.      Cervical: No cervical adenopathy.   Skin:     General: Skin is warm and dry.      Findings: No rash.      Nails: There is no clubbing or cyanosis.   Neurological:      Mental Status: She is alert.      Cranial Nerves: No cranial nerve deficit.      Sensory: No sensory deficit.      Deep Tendon Reflexes: Strength normal.   Psychiatric:         Mood and Affect: Mood and affect normal.         Speech: Speech normal.         Behavior: Behavior normal. Behavior is cooperative.         Thought Content: Thought content normal.         Cognition and Memory: Cognition and memory normal.       Assessment:     1. Weight loss    2. Left foot pain    3. Type 2 diabetes mellitus with stage 3a chronic kidney disease, without long-term current use of insulin    4. Malignant neoplasm of upper lobe of right lung    5. Ectopic atrial tachycardia    6. Abdominal aortic aneurysm (AAA) without rupture    7. Mixed simple and mucopurulent chronic bronchitis    8. Stage 3a chronic kidney disease    9. Esophageal dysphagia        Plan:      Problem List Items Addressed This Visit     Aortic aneurysm    CKD (chronic kidney disease) stage 3, GFR 30-59 ml/min    COPD (chronic obstructive pulmonary disease)    Ectopic atrial tachycardia    Left foot pain    Relevant Medications    etodolac (LODINE) 400 MG tablet    Malignant neoplasm of right lung    Relevant Orders    NM PET Cu64 dotatate, skull to mid thigh    Type 2 diabetes mellitus with stage 3a chronic kidney disease, without long-term current use of insulin    Relevant Orders    Microalbumin/Creatinine Ratio, Urine    Hemoglobin A1C    Lipid Panel    Ambulatory referral/consult to Optometry    Weight loss - Primary    Relevant Medications    megestroL (MEGACE) 400 mg/10 mL (40 mg/mL) Susp    Other Relevant Orders    NM PET Cu64 dotatate, skull to mid thigh    Ambulatory referral/consult to Gastroenterology    CBC Auto Differential    Comprehensive Metabolic Panel    TSH    Amylase    Lipase    Urinalysis, Reflex to Urine Culture Urine, Clean Catch    CT Abdomen Pelvis  Without Contrast      Other Visit Diagnoses     Esophageal dysphagia        Relevant Orders    NM PET Cu64 dotatate, skull to mid thigh    Ambulatory referral/consult to Gastroenterology    CT Abdomen Pelvis  Without Contrast        Follow up in about 4 weeks (around 3/16/2022) for Obtain all items ordered today..      I am having Gloria B. Geoffrey start on megestroL. I am also having her maintain her fish oil-omega-3 fatty acids, calcium carbonate, guaiFENesin, clotrimazole-betamethasone 1-0.05%, ipratropium, multivitamin, docusate sodium, budesonide-formoterol 160-4.5 mcg, albuterol, levothyroxine, fluticasone propionate, ondansetron, triamcinolone acetonide 0.5%, diltiaZEM, rosuvastatin, and etodolac.    Gloria was seen today for follow-up.    Diagnoses and all orders for this visit:    Weight loss  -     NM PET Cu64 dotatate, skull to mid thigh; Future  -     Ambulatory referral/consult to Gastroenterology; Future  -     CBC Auto  Differential; Future  -     Comprehensive Metabolic Panel; Future  -     TSH; Future  -     Amylase; Future  -     Lipase; Future  -     Urinalysis, Reflex to Urine Culture Urine, Clean Catch  -     CT Abdomen Pelvis  Without Contrast; Future  -     megestroL (MEGACE) 400 mg/10 mL (40 mg/mL) Susp; Take 5 mLs (200 mg total) by mouth once daily.  -     NM PET Cu64 dotatate, skull to mid thigh  -     CT Abdomen Pelvis  Without Contrast    Left foot pain  -     etodolac (LODINE) 400 MG tablet; Take 1 tablet (400 mg total) by mouth 2 (two) times daily as needed (foot pain).    Type 2 diabetes mellitus with stage 3a chronic kidney disease, without long-term current use of insulin  -     Microalbumin/Creatinine Ratio, Urine; Future  -     Hemoglobin A1C; Future  -     Lipid Panel; Future  -     Ambulatory referral/consult to Optometry; Future    Malignant neoplasm of upper lobe of right lung  -     NM PET Cu64 dotatate, skull to mid thigh; Future  -     NM PET Cu64 dotatate, skull to mid thigh    Ectopic atrial tachycardia  No change in tracking .   Abdominal aortic aneurysm (AAA) without rupture  Track over time. No change now.  Mixed simple and mucopurulent chronic bronchitis  Cont preventive meds  Stage 3a chronic kidney disease  Check labs.  Esophageal dysphagia  -     NM PET Cu64 dotatate, skull to mid thigh; Future  -     Ambulatory referral/consult to Gastroenterology; Future  -     CT Abdomen Pelvis  Without Contrast; Future  -     NM PET Cu64 dotatate, skull to mid thigh  -     CT Abdomen Pelvis  Without Contrast      Medications Ordered This Encounter   Medications    etodolac (LODINE) 400 MG tablet     Sig: Take 1 tablet (400 mg total) by mouth 2 (two) times daily as needed (foot pain).     Dispense:  20 tablet     Refill:  11    megestroL (MEGACE) 400 mg/10 mL (40 mg/mL) Susp     Sig: Take 5 mLs (200 mg total) by mouth once daily.     Dispense:  150 mL     Refill:  11     The patient was instructed to stop  the following meds:  Medications Discontinued During This Encounter   Medication Reason    etodolac (LODINE) 400 MG tablet      Orders Placed This Encounter   Procedures    NM PET Cu64 dotatate, skull to mid thigh     Standing Status:   Future     Number of Occurrences:   1     Standing Expiration Date:   2/16/2023     Order Specific Question:   May the Radiologist modify the order per protocol to meet the clinical needs of the patient?     Answer:   Yes     Order Specific Question:   Release to patient     Answer:   Immediate    CT Abdomen Pelvis  Without Contrast     Standing Status:   Future     Number of Occurrences:   1     Standing Expiration Date:   2/17/2023     Order Specific Question:   Is the patient allergic to iodine or contrast?     Answer:   No     Order Specific Question:   Is the patient on ANY Metformin medication such as Glucophage/Glucovance ?     Answer:   No     Order Specific Question:   Does the patient have any of the following risk factors?     Answer:   None    CBC Auto Differential     Standing Status:   Future     Standing Expiration Date:   4/17/2023    Comprehensive Metabolic Panel     Standing Status:   Future     Standing Expiration Date:   2/16/2023    TSH     Standing Status:   Future     Standing Expiration Date:   2/16/2023    Amylase     Standing Status:   Future     Standing Expiration Date:   4/17/2023    Lipase     Standing Status:   Future     Standing Expiration Date:   4/17/2023    Urinalysis, Reflex to Urine Culture Urine, Clean Catch     Specimen Source->Urine     Order Specific Question:   Preferred Collection Type     Answer:   Urine, Clean Catch     Order Specific Question:   Specimen Source     Answer:   Urine     Order Specific Question:   Collection Type     Answer:   Urine, Clean Catch    Microalbumin/Creatinine Ratio, Urine     May change to clinic collect if the patient is in the clinic at the time.     Standing Status:   Future     Standing Expiration  Date:   2/16/2023     Order Specific Question:   Specimen Source     Answer:   Urine    Hemoglobin A1C     Standing Status:   Future     Standing Expiration Date:   2/16/2023    Lipid Panel     Standing Status:   Future     Standing Expiration Date:   2/16/2023    Ambulatory referral/consult to Gastroenterology     Standing Status:   Future     Standing Expiration Date:   3/16/2023     Referral Priority:   Routine     Referral Type:   Consultation     Referral Reason:   Specialty Services Required     Requested Specialty:   Gastroenterology     Number of Visits Requested:   1    Ambulatory referral/consult to Optometry     Standing Status:   Future     Standing Expiration Date:   3/16/2023     Referral Priority:   Routine     Referral Type:   Vision (Optometry)     Referral Reason:   Specialty Services Required     Requested Specialty:   Optometry     Number of Visits Requested:   1

## 2022-02-17 ENCOUNTER — TELEPHONE (OUTPATIENT)
Dept: FAMILY MEDICINE | Facility: CLINIC | Age: 82
End: 2022-02-17
Payer: COMMERCIAL

## 2022-02-17 DIAGNOSIS — E03.9 ACQUIRED HYPOTHYROIDISM: ICD-10-CM

## 2022-02-17 RX ORDER — LEVOTHYROXINE SODIUM 88 UG/1
88 TABLET ORAL DAILY
Qty: 90 TABLET | Refills: 0 | Status: SHIPPED | OUTPATIENT
Start: 2022-02-17 | End: 2022-03-17

## 2022-02-17 NOTE — TELEPHONE ENCOUNTER
----- Message from Priyanka Interiano sent at 2/17/2022  8:43 AM CST -----  Contact: Gloria Castro called regarding a missed call from yesterday, please give her a call back at [phone]      Thanks  kb

## 2022-02-18 ENCOUNTER — TELEPHONE (OUTPATIENT)
Dept: FAMILY MEDICINE | Facility: CLINIC | Age: 82
End: 2022-02-18
Payer: COMMERCIAL

## 2022-02-18 NOTE — TELEPHONE ENCOUNTER
----- Message from Bel Camejo sent at 2/18/2022  2:58 PM CST -----  Contact: Patient, [phone]  Patient is returning a phone call.  Who left a message for the patient: Aiyana  Does patient know what this is regarding:  Lab results  Would you like a call back, or a response through your [facility] portal?:   Call back  Comments:  Missed your call, please call her back. Thanks.

## 2022-02-21 ENCOUNTER — TELEPHONE (OUTPATIENT)
Dept: FAMILY MEDICINE | Facility: CLINIC | Age: 82
End: 2022-02-21
Payer: COMMERCIAL

## 2022-02-21 DIAGNOSIS — E11.22 TYPE 2 DIABETES MELLITUS WITH STAGE 3A CHRONIC KIDNEY DISEASE, WITHOUT LONG-TERM CURRENT USE OF INSULIN: Primary | ICD-10-CM

## 2022-02-21 DIAGNOSIS — N18.31 TYPE 2 DIABETES MELLITUS WITH STAGE 3A CHRONIC KIDNEY DISEASE, WITHOUT LONG-TERM CURRENT USE OF INSULIN: Primary | ICD-10-CM

## 2022-02-21 RX ORDER — FLUOXETINE 10 MG/1
10 CAPSULE ORAL DAILY
Qty: 30 CAPSULE | Refills: 11 | Status: SHIPPED | OUTPATIENT
Start: 2022-02-21 | End: 2023-02-21

## 2022-02-21 NOTE — TELEPHONE ENCOUNTER
I have signed for the following orders AND/OR meds.  Please call the patient and ask the patient to schedule the testing AND/OR inform about any medications that were sent.      Orders Placed This Encounter   Procedures    Hemoglobin A1C     Standing Status:   Future     Standing Expiration Date:   2/21/2023

## 2022-02-21 NOTE — TELEPHONE ENCOUNTER
----- Message from Jyoti Calderon sent at 2/21/2022 10:14 AM CST -----  Pt is requesting a call back in regards to some medication. Pt can be reached at [phone] (cnzp)

## 2022-02-21 NOTE — TELEPHONE ENCOUNTER
A PA was done on pts megace. It was denied. Is there something else you can send to pt? Please advise.

## 2022-02-21 NOTE — TELEPHONE ENCOUNTER
----- Message from Lilia Mcnulty sent at 2/21/2022  1:43 PM CST -----  Who Called: Patient    What is the reqeust in detail: Returning phone call from Nurse Armenta. Please advise.     Can the clinic reply by [facility]? No    Best Call Back Number: [phone]    Additional Information: Please advise.

## 2022-02-21 NOTE — TELEPHONE ENCOUNTER
Please let her know that the Megase was denied by the insurance company and therefore I am sending in a low-dose fluoxetine to help with the appetite.     I have signed for the following orders AND/OR meds.  Please call the patient and ask the patient to schedule the testing AND/OR inform about any medications that were sent.      No orders of the defined types were placed in this encounter.      Medications Ordered This Encounter   Medications    FLUoxetine 10 MG capsule     Sig: Take 1 capsule (10 mg total) by mouth once daily.     Dispense:  30 capsule     Refill:  11

## 2022-02-22 ENCOUNTER — TELEPHONE (OUTPATIENT)
Dept: FAMILY MEDICINE | Facility: CLINIC | Age: 82
End: 2022-02-22
Payer: COMMERCIAL

## 2022-02-22 NOTE — TELEPHONE ENCOUNTER
Returned to patient and advised that Dr Norman did place orders for CT scan and PET scan. Refaxed orders to [facility]. Advised that patient can call to schedule.    Also advised that PA was denied on Megace. Dr Norman has changed medication to low dose Fluoxetine and this was sent to pharmacy yesterday. Pt verbalized understanding.

## 2022-02-22 NOTE — TELEPHONE ENCOUNTER
----- Message from Emily Slaughter sent at 2/22/2022 10:10 AM CST -----  Regarding: scans  Contact: patient  Patient needs to speak to nurse about some scans she is suppose to have, please call her back at [phone]

## 2022-02-23 ENCOUNTER — TELEPHONE (OUTPATIENT)
Dept: FAMILY MEDICINE | Facility: CLINIC | Age: 82
End: 2022-02-23
Payer: COMMERCIAL

## 2022-02-23 NOTE — TELEPHONE ENCOUNTER
PET Scan faxed to : [phone]    [facility]  [address] [address]  Hours: Monday-Friday, 8 a.m.-4:30 p.m.    Call [facility] at [phone].

## 2022-02-28 ENCOUNTER — TELEPHONE (OUTPATIENT)
Dept: NEPHROLOGY | Facility: CLINIC | Age: 82
End: 2022-02-28
Payer: COMMERCIAL

## 2022-02-28 DIAGNOSIS — N18.31 STAGE 3A CHRONIC KIDNEY DISEASE: Primary | ICD-10-CM

## 2022-02-28 NOTE — TELEPHONE ENCOUNTER
----- Message from Jyoti Calderon sent at 2/28/2022  7:41 AM CST -----  Pt is calling in regards to having to cancel her appt. Pt states that she is weak and need to get appt reschedule. Pt states that she would like to do a telephone visit. Pt can be reached at [phone] (home)

## 2022-02-28 NOTE — TELEPHONE ENCOUNTER
Called patient and explained recent lab results from Dr. Philippe and understood. A 6 month appointment was made with labs before and mailed.

## 2022-03-28 ENCOUNTER — TELEPHONE (OUTPATIENT)
Dept: FAMILY MEDICINE | Facility: CLINIC | Age: 82
End: 2022-03-28
Payer: COMMERCIAL

## 2022-03-28 DIAGNOSIS — R19.7 DIARRHEA, UNSPECIFIED TYPE: Primary | ICD-10-CM

## 2022-03-28 RX ORDER — DIPHENOXYLATE HYDROCHLORIDE AND ATROPINE SULFATE 2.5; .025 MG/1; MG/1
1 TABLET ORAL 4 TIMES DAILY PRN
Qty: 20 TABLET | Refills: 0 | Status: SHIPPED | OUTPATIENT
Start: 2022-03-28 | End: 2022-04-07

## 2022-03-28 RX ORDER — CEFACLOR 500 MG
500 CAPSULE ORAL 3 TIMES DAILY
Qty: 30 CAPSULE | Refills: 0 | Status: SHIPPED | OUTPATIENT
Start: 2022-03-28 | End: 2022-04-07

## 2022-03-28 NOTE — TELEPHONE ENCOUNTER
----- Message from Priyanka Interiano sent at 3/28/2022 11:51 AM CDT -----  Contact: Gloria Castro called regarding some medication she was given in the hospital  and want to speak with someone regarding that medication, please give her a call back at [phone]      Thanks  Kb

## 2022-03-28 NOTE — TELEPHONE ENCOUNTER
Patient states that she was given amoxicillin-clavulanate (Augmentin) 875-125 mg Tab last week at [facility] due to pneumonia and she cannot take amoxicillin due to it causing her to have palpitations when taking this medication and is wanting to know if she needs another antibiotic. patient states that since she started on ABX she has had loose bowels and would like something called in for this.  I have patient scheduled for hospital follow up on Friday with you.

## 2022-03-28 NOTE — TELEPHONE ENCOUNTER
The antibiotic itself can cause the diarrhea.  Hwoever, it can do it thorugh causing C. Difficile.  We need stool studies to be done. I will send in lomotil also and we need to change the augmentin to ceclor.      I have signed for the following orders AND/OR meds.  Please call the patient and ask the patient to schedule the testing AND/OR inform about any medications that were sent.      Orders Placed This Encounter   Procedures    Clostridium difficile EIA     Standing Status:   Future     Standing Expiration Date:   5/27/2023    Giardia / Cryptosporidum, EIA     Standing Status:   Future     Standing Expiration Date:   5/27/2023    Stool Exam-Ova,Cysts,Parasites     Obtain a sample in a cup at home and return it to the clinic lab.     Standing Status:   Future     Standing Expiration Date:   3/28/2023    WBC, Stool     Obtain a sample in a cup at home and return it to the clinic lab.     Standing Status:   Future     Standing Expiration Date:   3/28/2023    LA  L CULTURE       Medications Ordered This Encounter   Medications    cefaclor (CECLOR) 500 mg Cap     Sig: Take 1 capsule (500 mg total) by mouth 3 (three) times daily. for 10 days     Dispense:  30 capsule     Refill:  0    diphenoxylate-atropine 2.5-0.025 mg (LOMOTIL) 2.5-0.025 mg per tablet     Sig: Take 1 tablet by mouth 4 (four) times daily as needed for Diarrhea.     Dispense:  20 tablet     Refill:  0

## 2022-03-31 ENCOUNTER — LAB VISIT (OUTPATIENT)
Dept: LAB | Facility: HOSPITAL | Age: 82
End: 2022-03-31
Attending: FAMILY MEDICINE
Payer: COMMERCIAL

## 2022-03-31 DIAGNOSIS — R19.7 DIARRHEA, UNSPECIFIED TYPE: ICD-10-CM

## 2022-03-31 LAB — WBC #/AREA STL HPF: NORMAL /[HPF]

## 2022-03-31 PROCEDURE — 87177 OVA AND PARASITES SMEARS: CPT | Performed by: FAMILY MEDICINE

## 2022-03-31 PROCEDURE — 87329 GIARDIA AG IA: CPT | Performed by: FAMILY MEDICINE

## 2022-03-31 PROCEDURE — 87209 SMEAR COMPLEX STAIN: CPT | Performed by: FAMILY MEDICINE

## 2022-03-31 PROCEDURE — 87449 NOS EACH ORGANISM AG IA: CPT | Performed by: FAMILY MEDICINE

## 2022-03-31 PROCEDURE — 89055 LEUKOCYTE ASSESSMENT FECAL: CPT | Performed by: FAMILY MEDICINE

## 2022-04-01 ENCOUNTER — OFFICE VISIT (OUTPATIENT)
Dept: FAMILY MEDICINE | Facility: CLINIC | Age: 82
End: 2022-04-01
Payer: MEDICARE

## 2022-04-01 VITALS
WEIGHT: 116 LBS | BODY MASS INDEX: 21.9 KG/M2 | HEART RATE: 69 BPM | DIASTOLIC BLOOD PRESSURE: 80 MMHG | TEMPERATURE: 98 F | SYSTOLIC BLOOD PRESSURE: 136 MMHG | HEIGHT: 61 IN

## 2022-04-01 DIAGNOSIS — J18.9 PNEUMONIA DUE TO INFECTIOUS ORGANISM, UNSPECIFIED LATERALITY, UNSPECIFIED PART OF LUNG: Primary | ICD-10-CM

## 2022-04-01 DIAGNOSIS — C34.90 MALIGNANT NEOPLASM OF LUNG, UNSPECIFIED LATERALITY, UNSPECIFIED PART OF LUNG: ICD-10-CM

## 2022-04-01 LAB
C DIFF GDH STL QL: NEGATIVE
C DIFF TOX A+B STL QL IA: NEGATIVE
CRYPTOSP AG STL QL IA: NEGATIVE
G LAMBLIA AG STL QL IA: NEGATIVE

## 2022-04-01 PROCEDURE — 99999 PR PBB SHADOW E&M-EST. PATIENT-LVL IV: CPT | Mod: PBBFAC,,, | Performed by: FAMILY MEDICINE

## 2022-04-01 PROCEDURE — 99495 TRANSJ CARE MGMT MOD F2F 14D: CPT | Mod: S$PBB,,, | Performed by: FAMILY MEDICINE

## 2022-04-01 PROCEDURE — 99999 PR PBB SHADOW E&M-EST. PATIENT-LVL IV: ICD-10-PCS | Mod: PBBFAC,,, | Performed by: FAMILY MEDICINE

## 2022-04-01 PROCEDURE — 99214 OFFICE O/P EST MOD 30 MIN: CPT | Mod: PBBFAC,PO | Performed by: FAMILY MEDICINE

## 2022-04-01 PROCEDURE — 99495 TCM SERVICES (MODERATE COMPLEXITY): ICD-10-PCS | Mod: S$PBB,,, | Performed by: FAMILY MEDICINE

## 2022-04-01 NOTE — PROGRESS NOTES
Subjective:      Patient ID: Gloria Hale is a 81 y.o. female.    Chief Complaint: Follow-up      The patient was recently hospitalized at [facility] for pneumonia.  The discharge summary from the hospitalization is copied below for reference and completeness.      Transitional Care Note    Family and/or Caretaker present at visit?  No.  Diagnostic tests reviewed/disposition: No diagnosic tests pending after this hospitalization.  Disease/illness education: she understands what she had.  Home health/community services discussion/referrals: Patient does not have home health established from hospital visit.  They do not need home health.  If needed, we will set up home health for the patient.   Establishment or re-establishment of referral orders for community resources: No other necessary community resources.   Discussion with other health care providers: No discussion with other health care providers necessary.     Problem List Items Addressed This Visit    None     Visit Diagnoses     Pneumonia due to infectious organism, unspecified laterality, unspecified part of lung    -  Primary    Malignant neoplasm of lung, unspecified laterality, unspecified part of lung          [facility]  Outside Information          Admission  3/22/2022  [facility]  Gloria Hale - 81 y.o. Female; born [birth year]Encounter Summary, generated on Apr. 01, 2022April 01, 2022       Additional Documents     Discharge Summary (Last Received: 3/25/2022  7:37 PM)    Encounter Summary - Atypical pneumonia (Primary Dx);   Chronic obstructive pulmonary disease, unspecified COPD type (HCC);   Bronchitis;   Anemia, unspecified type;   History of lung cancer;   Pneumonia, unspecified organism;   Chronic obstructive pulmonary disease, unsp... (Last Received: 4/1/2022 11:28 AM) - Currently Viewing       Encounter Summary        Reason for Referral      Consultation (Routine) -  Pending Review  Reason for Referral - Consultation (Routine) - Pending Review  Specialty Diagnoses / Procedures Referred By Contact Referred To Contact   Pulmonary Rehabilitation Diagnoses    Chronic obstructive pulmonary disease, unspecified (HCC)     Horace Bryan MD    [address] [place] [address]    Phone: [phone]    Fax: [phone]   [facility]    [address] [place] [address]    Phone: [phone]    Fax: [phone]       Reason for Referral - Consultation (Routine) - Pending Review  Referral ID Status Reason Start Date Expiration Date Visits Requested Visits Authorized   [id number] Pending Review Continuity of Care   3/25/2022 9/21/2022 1 1      Electronically signed by Horace Bryan MD at 03/25/2022 5:50 PM CDT      Reason for Visit      Reason for Visit -   Reason Comments   Shortness of Breath          Auth/Cert  Reason for Visit - Auth/Cert  Specialty Diagnoses / Procedures Referred By Contact Referred To Contact     Diagnoses    Bronchitis    Atypical pneumonia    History of lung cancer    Chronic obstructive pulmonary disease, unspecified COPD type (HCC)    Anemia, unspecified type                                   Reason for Visit - Auth/Cert  Referral ID Status Reason Start Date Expiration Date Visits Requested Visits Authorized   [id number]         1 1        Encounter Details      Encounter Details  Date Type Department Care Team Description   03/22/2022 - 03/25/2022 Hospital Encounter [facility]    [address] [place] [address] [phone]   Mick Floyd MD    [address] [place] [address] [phone] (Work)    [phone] (Fax)       Nilson Orr MD    [address] [place] [address] [phone] (Work)    [phone] (Fax)       Anil Connolly DO    [address] [place] [address] [phone] (Work)    [phone] (Fax)   Atypical pneumonia  "(Primary Dx);   Chronic obstructive pulmonary disease, unspecified COPD type (HCC);   Bronchitis;   Anemia, unspecified type;   History of lung cancer;   Pneumonia, unspecified organism;   Chronic obstructive pulmonary disease, unspecified (HCC);   Bronchitis, not specified as acute or chronic;   Anemia, unspecified;   Personal history of other malignant neoplasm of bronchus and lung;   Essential (primary) hypertension;   Hyperlipidemia, unspecified;   Disorder of thyroid, unspecified;   Lung mass     Social History  - documented as of this encounter    Social History  Tobacco Use Types Packs/Day Years Used Date   Former Smoker Cigarettes 1 20 Quit: 01/01/2007   Smokeless Tobacco: Never Used           Social History  Alcohol Use Standard Drinks/Week Comments   No 0 (1 standard drink = 0.6 oz pure alcohol)       Social History  Sexually Active Birth Control Partners Comments   Never           Social History  Sex Assigned at Birth Date Recorded   Not on file       Social History  COVID-19 Exposure Response Date Recorded   In the last 10 days, have you been in contact with someone who was confirmed or suspected to have Coronavirus/COVID-19? No / Unsure 3/23/2022 4:44 AM CDT     Last Filed Vital Signs  - documented in this encounter    Last Filed Vital Signs  Vital Sign Reading Time Taken Comments   Blood Pressure 140/65 03/25/2022 3:04 PM CDT     Pulse 75 03/25/2022 3:04 PM CDT     Temperature 36.6 °C (97.9 °F) 03/25/2022 3:04 PM CDT     Respiratory Rate 18 03/25/2022 3:04 PM CDT     Oxygen Saturation 96% 03/25/2022 3:04 PM CDT     Inhaled Oxygen Concentration - -     Weight 53.5 kg (117 lb 15.1 oz) 03/25/2022 3:30 AM CDT     Height 154.9 cm (5' 1") 03/22/2022 7:55 PM CDT     Body Mass Index 22.29 03/22/2022 7:55 PM CDT       Discharge Summaries  - documented in this encounter    Anil Connolly DO - 03/25/2022 3:44 PM CDT  Formatting of this note is different from the original.  Images from the original note were not " included.  [facility] DISCHARGE SUMMARY    Patient ID:  Gloria Hale  [id number]  81 y.o.  1940    Admit Date:   3/22/2022 2:40 PM    Discharge Date:   Discharge Today: 3/25/2022    Admitting Physician:   Bipin Connolly DO     Discharge Physician:   ANIL CONNOLLY DO    Consults:  Consultants   Provider Service Role Specialty   Silvino, Christine, NP -- Nurse Practitioner Nurse Practitioner Family   Horace Bryan MD Pulmonology Consulting Physician Pulmonary Disease       Reason for Admission/Admission Diagnoses:   Present on Admission:   Atypical pneumonia   Thyroid disease   Hypertension   Hyperlipidemia   Chronic obstructive pulmonary disease, unspecified COPD type (HCC)    Discharge Diagnoses:   Active Hospital Problems   Diagnosis Date Noted    Atypical pneumonia 03/22/2022    Thyroid disease 03/25/2022    Hypertension 03/25/2022    Hyperlipidemia 03/25/2022    Chronic obstructive pulmonary disease, unspecified COPD type (HCC) 04/11/2021     Resolved Hospital Problems     History of Present Illness:   Gloria Hale is an 81-year-old female with past medical history significant for COPD, hyperlipidemia,hypertension, lung cancer, and thyroid disease. Pt presented to the ED today with chief complaint of shortness of breath. Symptoms began this morning while she was sitting down, they are constant, made better with rest, worse with activity. She does states that she feels like she has seen a slight decline over the last several weeks in her respiratory status. Also reports a chronic cough productive of sputum. She does admit that she is not very compliant with her Symbicort at home. Denies nausea, vomiting, fevers, chills, diarrhea, constipation, chest pain, headaches, wheezing.     Hospital Course and Treatment:   Admission Information   Date & Time  3/22/2022 Provider  Anil Connolly DO Department  [facility] Medicine Dept. Phone  [phone]         Respiratory distress (resolved)  Possible  pneumonia   COPD Exacerbation:   -Pt with RR up to 40/min in the ED   -Given one time dose of IV solu medrol in the ED   -CTPE negative for PE   -Pt non compliant with symbicort at home, likely a big factor in her presentation to the ED   -Breo while in the hospital and PRN albuterol nebs   -CT chest showed ill-defined soft tissue fullness within the right hilar region with associated bronchial wall thickening and airway luminal narrowing, potentially representing fibrosis/scarring, infectious/inflammatory or neoplastic process.  -Pt started on Zosyn and azithromycin for empiric atypical and aspiration coverage per pulm recs; can likely deescalate to Augmentin and azithro x 5 days if ok with pulm   -Pulmonology consulted  - Echo from 3/23/22: LV Systolic function normal, EF 55-60%.   -Pt had bronchoscopy with biopsy and bronchial wash on 3/24/22. Cultures pending.  -On day of discharge pt very comfortable on room air, saturating 96%      Nomrocytic anemia:  -On admit H/H 11.2/35.8     Hyperglycemia:  T2DM   -Glucose 103 on admit   -A1C 5.9 on 2/16/22. As high 6.6 in 2020.      Hypothyroidism: Continued home synthroid.      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 Trujillo on November 8, 2017 with lobar resection along with thoracic lymph node dissection and resection     Disposition: Pt stable for DC to home. Sent with prescriptions for azithromycin and Augmentin. Plan to follow up with PCP in one week. Follow up with Dr. Bryan in 2 weeks.     I discussed with the patient disease process and treatment.     I have personally seen and examined the patient, Gloria Hale, in a face to face encounter on the date of discharge.     She is cleared for discharge with instructions to follow up as directed.   Total time in the care and discharge planning of this patient was greater than 30 minutes.    Significant Diagnostic Studies:  Recent Imaging and Procedure Results    Procedure Component Value Ref Range Date/Time   AFB stain [[id number]] Collected: 03/24/2022 0854   Specimen: N/A from Bronchial Washing Updated: 03/25/2022 1454   AFB Direct Smear Result No acid fast bacillus seen.   Gram stain [[id number]] Collected: 03/24/2022 0854   Specimen: Bronchial Washing Updated: 03/25/2022 1139   Gram Stain Result <10 epithelial cells/LPF.   Gram Stain Result <10 WBC's/LPF.   Gram Stain Result No organisms seen.   ECHO 2D complete [[id number]] Collected: 03/23/2022 1030   Updated: 03/23/2022 1358   PatientHeight 154.94 cm   PatientWeight 53.5248 kg   Heart Rate 84 bpm   Systolic Pressure 111 mmHg   Diastolic Pressure 56 mmHg   BSA 1.5 m   2D/MMode measurements and calculations: MMode 2D Measurements & Calculations   RVDd 2.9 cm   Interventricular Septum in Diastole 0.92 cm   Left Ventricle in Diastole 3.4 cm   Left Ventricle in Systole 2.4 cm   LV post wall in Diastole 0.81 cm   IVS/LVPW 1.1   FS 30.7 %   EDV(Teich) 48.1 ml   ESV(Teich) 19.6 ml   EF(Teich) 59.3 %   EDV(cubed) 40 ml   ESV(cubed) 13.3 ml   EF(cubed) 66.7 %   LV mass(C)d 80.8 grams   LV mass(C)dI 53.5 grams/m   SV(Teich) 28.6 ml   SI(Teich) 18.9 ml/m   SV(cubed) 26.7 ml   SI(cubed) 17.7 ml/m   Ao root diameter 2.3 cm   Ao root area 4.2 cm   LA Dimension 2.9 cm   LA/Ao 1.2   LVOT Diameter 2 cm   LVOT area 3.1 cm   LVOT area (traced) 3.1 cm   Time Measurements Time Measurements   Aortic R-R 0.76 sec   Aortic HR 79 BPM   Doppler measurements and calculations: Doppler Measurements & Calculations   MV e max velocity 129 cm/sec   MV a velocity 164 cm/sec   MV e/a ratio 0.79   MV Vmax 169.7 cm/sec   MV MEAN PG 11.5 mmHg   MV Vmean 103.3 cm/sec   MV MAX PG 4.8 mmHg   MV V2 VTI 39.3 cm   MVA(VTI) 2.4 cm   MV P ½ time max ruthy 136.9 cm/sec   MV P ½ 75.4 msec   MVA ( P ½ ) 2.9 cm   MV Dec slope 531.7 cm/sec   MV dec time 0.23 sec   Ao V2 Max 145.5 cm/sec   Ao max PG 8.5 mmHg   Ao max PG (full) 3.1 mmHg   Ao V2 mean 99.8 cm/sec   Ao  mean PG 4.5 mmHg   Ao mean PG (full) 0.89 mmHg   Ao V2 VTI 31.9 cm   DEVAN(I,A) 3 cm   DEVAN(I,D) 3 cm   DEVAN(V,A) 2.5 cm   DEVAN(V,D) 2.5 cm   LV V1 max PG 5.4 mmHg   LV V1 mean PG 3.6 mmHg   LV V1 max 116.3 cm/sec   LV V1 mean 92.3 cm/sec   LV V1 VTI 31 cm   CO(Ao) 10.6 l/min   CI(Ao) 7 l/min/m   SV(Ao) 134.6 ml   SI(Ao) 89.1 ml/m   CO(LVOT) 7.6 l/min   CI(LVOT) 5 l/min/m   SV(LVOT) 96 ml   SI(LVOT) 63.6 ml/m   TR max ruthy 230.3 cm/sec   TR max PG 21.2 mmHg   EF MIN = 55 %   EF MAX = 60 %   Narrative:       Adult Echocardiogram  Name: BABATUNDE AMIN Study Date: 2022  MRN: [id number] Age: 81 yrs  Patient Location: [facility]^[id number]^[id number]-P Height: 61 in  : 1940 Weight: 118 lb  Gender: Female BSA: 1.5 m2  Reason For Study: dyspnea  History: dyspnea  BP: 111/56 mmHg    Ordering Physician: CITLALLI IRELAND  Performed By: JAZMÍN Motley    Interpretation Summary  The study was technically limited.  Lung Interference  Ejection Fraction = 55-60%.  Left ventricular systolic function is normal.  The study was technically limited.    Measurements with Normals  RVDd: 2.9 cm (1.9-3.8 cm)  IVSd: 0.92 cm (0.6-1.1 cm) LVIDd: 3.4 cm (3.7-5.6 cm)  LVPWd: 0.81 cm (0.6-1.1 cm) LVIDs: 2.4 cm (2.3-3.9 cm)  Ao root diam: (2.0-3.7 cm)  2.3 cm  LVOT diam: 2.0 cm (2.5 cm) LA dimension: 2.9 cm(1.9-4.0 cm)    MMode/2D Measurements & Calculations  FS: 30.7 % Ao root area: 4.2 cm2  EDV(Teich): 48.1 ml  ESV(Teich): 19.6 ml  EF(Teich): 59.3 %  LVOT area: 3.1 cm2 Aortic R-R: 0.76 sec  ______________________________________________________________________________  RVIDd/LVIDd_phl: 0.84 TAPSE_phl: 2.3 cm    Time Measurements  Aortic HR: 79.0 BPM MV dec time: 0.23 sec    Doppler Measurements & Calculations  MV E max ruthy: 129.0 cm/sec MV V2 max: 169.7 cm/sec  MV A max ruthy: 164.0 cm/sec MV max P.5 mmHg  MV E/A: 0.79 MV V2 mean: 103.3 cm/sec  MV mean P.8 mmHg  MV V2 VTI: 39.3 cm  MVA(VTI): 2.4 cm2  MV P1/2t max ruthy: 136.9 cm/sec Ao V2 max: 145.5  cm/sec  MV P1/2t: 75.4 msec Ao max P.5 mmHg  MVA(P1/2t): 2.9 cm2 Ao V2 mean: 99.8 cm/sec  MV dec slope: 531.7 cm/sec2 Ao mean P.5 mmHg  Ao V2 VTI: 31.9 cm  DEVAN(I,D): 3.0 cm2  DEVAN(V,D): 2.5 cm2  LV V1 max P.4 mmHg CO(Ao): 10.6 l/min  LV V1 mean PG: 3.6 mmHg CO(LVOT): 7.6 l/min  LV V1 max: 116.3 cm/sec CI(LVOT): 5.0 l/min/m2  LV V1 mean: 92.3 cm/sec SV(LVOT): 96.0 ml  LV V1 VTI: 31.0 cm  TR max ruthy: 230.3 cm/sec AV VR_phl: 0.79  TR max P.2 mmHg    ______________________________________________________________________________  DEVAN(VTI)/BSA_phl: 2.0 MV P1/2t-pr_phl: 75.4 msec    ______________________________________________________________________________  RV S Vel_phl: 18.2 cm/sec    LEFT VENTRICLE    o The left ventricle is normal in size.  o Ejection Fraction = 55-60%.  o Left ventricular systolic function is normal.  o There is normal left ventricular wall thickness.    DIASTOLOGY    o Lateral e'= '8' cm/s.  o Septal e'= '5' cm/s.  o E/e' ='20'.  o LAEDV index = '16' ml/m2.    RIGHT VENTRICLE    o The right ventricle is normal in size and function.    ATRIA    o The left atrial size is normal.  o Right atrial size is normal.    MITRAL VALVE    o Mitral valve leaflets appear to be mildly thickened.  o There is mild mitral annular calcification.  o There is trace mitral regurgitation.    TRICUSPID VALVE    o The tricuspid valve is not well visualized, but is grossly normal.  o There is trace to mild tricuspid regurgitation.  o There is no tricuspid stenosis.    AORTIC VALVE    o Aortic Valve Grossly Normal.  o Aortic sclerosis is mild.  o No aortic regurgitation is present.  o AV MAX PG 8.5 mmHg.    PULMONIC VALVE    o Pulmonary valve not well visualized.    GREAT VESSELS    o The aortic root is normal size.  o Descending aorta not well visualized.    PERICARDIUM/PLEURAL EFFUSION    o There is no pericardial effusion.  o There is no pleural  effusion.    ______________________________________________________________________________  Electronically signed by: Anil Foster MD 03/23/2022 01:58 PM  InterpretingPhysician: Anil Foster MD electronically signed on 2022-03-23 13:58:25.15   CT Angiogram PE Protocol W Contrast [[id number]] Collected: 03/22/2022 1614   Updated: 03/22/2022 1640   Narrative:   REASON FOR EXAM: PE suspected, high prob     TECHNICAL FACTORS: Multiple contiguous axial CT images were obtained of the chest after the administration of intravenous contrast. ASIR was utilized for radiation reduction. 2-D sagittal and coronal reformatted images were performed. 3-D MIP images were   also obtained with postprocessing performed without the use of an independent workstation under concurrent radiologist supervision.    COMPARISON: 6/25/2021 CT pulmonary angiography; 11/5/2021 CT chest without contrast    FINDINGS:    Soft tissues/Musculature: Unremarkable  Osseous Structures: No acute abnormality. Degenerative changes noted throughout the visualized spine.  Thyroid: Unremarkable  Esophagus: Unremarkable  Upper abdomen: Bilateral renal cysts, incompletely characterized, largest measuring up to approximately 4 cm within the left superior renal pole. Moderate atherosclerotic disease identified within the visualized abdominal aorta. Ill-defined fullness of   the pancreatic body, stable in comparison to the prior examinations.  Aorta and Great Vessels: Moderate mixed calcified and noncalcified atheromatous disease identified throughout the normal caliber thoracic aorta as well as within the origins and proximal segments of the supra aortic great vessels.  Heart: Normal size heart. Moderate coronary arterial disease. No significant pericardial effusion.  Pulmonary artery:No evidence of pulmonary embolus.  Mediastinum:Ill-defined right hilar fullness measuring up to approximately 15 mm (2-53), potentially representing adenopathy or mass  lesion.  Lungs/Pleura: Within the region of right hilar ill-defined fullness, there is bronchial/bronchiolar wall thickening and questionable underlying narrowing. These findings raise concern for potential underlying neoplastic versus infectious/inflammatory   process within this region. Correlate clinically. Consider further evaluation with bronchoscopy. Postoperative changes of right upper lobectomy noted. Diffuse mild centrilobular emphysematous changes noted. Areas of groundglass opacities/nodularities   identified within the superior segment of the right lower lobe, similar to prior examinations. Similar subpleural fibrotic changes identified along the anterolateral aspect of the right lower lobe. Similar patchy focal region of groundglass opacities   identified within the posterolateral aspect of the right lower lobe (2-42). Subsegmental curvilinear opacities noted within the inferior aspect of the right middle lobe and lingula, favored to represent atelectasis/scarring. No lobar consolidation,   pleural effusion, or pneumothorax. Trachea and mainstem bronchi are patent with possible bronchomalacia. Diffuse mild bronchial/bronchiolar wall thickening identified, suggestive of chronic bronchitis. Correlate clinically.    IMPRESSION:  1. No evidence of pulmonary embolus.  2. Ill-defined soft tissue fullness within the right hilar region with associated bronchial wall thickening and airway luminal narrowing, potentially representing fibrosis/scarring, infectious/inflammatory or neoplastic process. Correlate clinically.   Consider further evaluation with bronchoscopy.  3. Diffuse mild bronchial/bronchiolar wall thickening suggestive bronchitis/bronchiolitis. Potential bronchomalacia.  4. Ill-defined nodular regions of groundglass opacities throughout the right lung. These appear similar in comparison to most recent examinations but slightly increased in prominence in size since more remote examinations dating back  to 2018. Findings   are nonspecific and potentially represent infectious/inflammatory versus neoplastic process.  5. Pulmonary emphysema.  6. Additional incidental, age-related, and/or chronic findings as described above..         Electronically signed by Casper Cloud MD on 3/22/2022 4:37 PM        Surgical Procedures during this visit:    Procedure(s):  BRONCHOSCOPY with cold biopsy/bronchial wash  Date  3/24/2022  Primary Surgeon  Horace Bryan MD  -------------------    Patient's Condition On Discharge:   Good    Physical Exam  Constitutional:   General: She is not in acute distress.  Appearance: She is not toxic-appearing.   HENT:   Head: Normocephalic and atraumatic.   Cardiovascular:   Rate and Rhythm: Normal rate and regular rhythm.   Pulses: Normal pulses.   Heart sounds: Normal heart sounds.   Pulmonary:   Effort: Pulmonary effort is normal. No respiratory distress.   Breath sounds: Normal breath sounds. No wheezing.   Abdominal:   General: Abdomen is flat. Bowel sounds are normal. There is no distension.   Palpations: Abdomen is soft.   Skin:  General: Skin is warm and dry.   Coloration: Skin is not jaundiced.   Findings: No bruising.   Neurological:   General: No focal deficit present.   Mental Status: She is alert and oriented to person, place, and time.     Discharge Disposition:   Order for Discharge (From admission, onward)       Start Ordered   03/25/22 1544 Discharge Disposition to: Home or Self Care Once   Expected Discharge Date: 03/25/22     Question: Discharge Disposition to Answer: Home or Self Care   03/25/22 1544   03/25/22 0000 Follow-up with PCP Schedule for: 1; Weeks   Question Answer Comment   Schedule for 1   when Weeks     03/25/22 1544   03/25/22 0000 Follow-up with: HORACE BRYAN; Schedule for: 2; Weeks   Question Answer Comment   with HORACE BRYAN   Schedule for 2   when Weeks     03/25/22 1544             Discharge Orders:    Future Appointments:  Future Appointments   [facility] [place]   5/24/2022 11:00 AM Horace Bryan MD [facility] [phone] [place]       Immunizations Administered for This Admission   No immunizations given this admission.         Medication List     START taking these medications   amoxicillin-clavulanate 875-125 mg Tab per tablet  Quantity: 8 tablet  Commonly known as: Augmentin  Take 1 tablet by mouth 2 (two) times daily for 4 days  Start taking on: March 26, 2022      azithromycin 250 MG Tab tablet  Quantity: 3 tablet  Commonly known as: Zithromax  Take 1 tablet (250 mg total) by mouth daily  Start taking on: March 26, 2022        CONTINUE taking these medications   * albuterol 90 mcg/actuation Hfaa inhaler  Quantity: 1 each  Commonly known as: Ventolin HFA  Inhale 2 puffs into the lungs every 6 (six) hours as needed for Wheezing.      * albuterol 1.25 mg/3 mL Nebu nebulizer solution  Quantity: 1 Package  Commonly known as: ACCUNEB  Take 3 mLs (1.25 mg total) by nebulization every 6 (six) hours as needed for Wheezing or Shortness of Breath.      budesonide-formoteroL 160-4.5 mcg/actuation Hfaa inhaler  Commonly known as: SYMBICORT  Inhale 2 puffs into the lungs every 12 (twelve) hours      calcium carbonate 500 mg calcium (1,250 mg) Tab tablet  Commonly known as: OS-PAPITO  Take 1 tablet by mouth daily      clotrimazole-betamethasone 1-0.05 % Crea topical cream  Commonly known as: LOTRISONE  Apply topically 2 (two) times daily.      Constulose 10 gram/15 mL Soln solution  Generic drug: lactulose      diltiaZEM 240 MG Cp24 24 hr capsule  Commonly known as: CARDIZEM CD  Take 240 mg by mouth nightly      fluticasone propionate 50 mcg/actuation Spsn nasal spray  Commonly known as: FLONASE  USE 2 SPRAYS IN EACH NOSTRIL EVERY DAY.      guaiFENesin 600 mg Ta12 12 hr tablet  Quantity: 20 tablet  Commonly known as: Mucinex  Take 2 tablets (1,200 mg total) by mouth 2 (two) times daily      levothyroxine 75 MCG Tab tablet  Commonly known as:  SYNTHROID  Take 88 mcg by mouth daily      omega-3 fatty acids-fish oil 300-1,000 mg Cap  Take 2 g by mouth.      * ondansetron 4 MG Tbdi disintegrating tablet  Quantity: 8 tablet  Commonly known as: Zofran ODT  Take 1 tablet (4 mg total) by mouth every 8 (eight) hours as needed      * ondansetron 4 MG Tbdi disintegrating tablet  Quantity: 12 tablet  Commonly known as: ZOFRAN-ODT  Take 2 tablets (8 mg total) by mouth every 8 (eight) hours as needed for Nausea      rosuvastatin 40 MG Tab tablet  Commonly known as: CRESTOR  Take 40 mg by mouth nightly        * This list has 4 medication(s) that are the same as other medications prescribed for you. Read the directions carefully, and ask your doctor or other care provider to review them with you.           Where to Get Your Medications     [facility], [facility] [address] [facility] [address] [address] [address]   Phone: [phone]   · amoxicillin-clavulanate 875-125 mg Tab per tablet  · azithromycin 250 MG Tab tablet      Discharge Orders   Future Labs/Procedures Expected by Expires   Activity as tolerated As directed   Diet (Select type) Cardiac/Low Chol/SOWMYA As directed   Questions:   Diet Type: Cardiac/Low Chol/SOWMYA   Other Restriction(s):   Liquid Consistency:   Sodium Restriction:   Fluid restriction:   Follow-up with PCP Schedule for: 1; Weeks As directed   Questions:   Schedule for: 1   when: Weeks   Follow-up with: MARCY SAM; Schedule for: 2; Weeks As directed   Questions:   with: MARCY SAM   Schedule for: 2   when: Weeks       Dr. Gabriele Connolly D.O.   [facility]   3/25/2022       Electronically signed by Anil Connolly DO at 03/26/2022 2:37 PM CDT   Discharge Instructions  - documented in this encounter    Attachments  The following attachments cannot be sent through Care Everywhere.    Community-Acquired Pneumonia Adult Easy-to-Read (English)  Azithromycin tablets (English)  Amoxicillin; Clavulanic  Acid Tablets (English)  Phlebitis Easy-to-Read (English)  Hydroxyzine capsules or tablets (English)    Medications at Time of Discharge  - documented as of this encounter    Medications at Time of Discharge  Medication Sig Dispensed Refills Start Date End Date   albuterol (ACCUNEB) 1.25 mg/3 mL nebulizer solution    Indications: Acute bronchitis, unspecified organism, Bilateral rales Take 3 mLs (1.25 mg total) by nebulization every 6 (six) hours as needed for Wheezing or Shortness of Breath. 1 Package   1 07/05/2018     albuterol (VENTOLIN HFA) 90 mcg/actuation inhaler    Indications: Acute bronchitis, unspecified organism, Bilateral rales Inhale 2 puffs into the lungs every 6 (six) hours as needed for Wheezing. 1 each   2 06/04/2018     azithromycin (Zithromax) 250 MG Tab tablet   Take 1 tablet (250 mg total) by mouth daily 3 tablet   0 03/26/2022     budesonide-formoteroL (SYMBICORT) 160-4.5 mcg/actuation HFAA inhaler   Inhale 2 puffs into the lungs every 12 (twelve) hours   0 01/06/2020     calcium carbonate 500 mg calcium (1,250 mg) tablet   Take 1 tablet by mouth daily   0       clotrimazole-betamethasone 1-0.05 % topical cream   Apply topically 2 (two) times daily.   0 08/11/2017     Constulose 10 gram/15 mL solution       0 09/17/2020     diltiaZEM 240 MG 24 hr capsule   Take 240 mg by mouth nightly   0 01/24/2018     fluticasone propionate (FLONASE) 50 mcg/actuation SpSn nasal spray   USE 2 SPRAYS IN EACH NOSTRIL EVERY DAY.   0 04/06/2021     guaiFENesin (Mucinex) 600 mg Ta12 12 hr tablet   Take 2 tablets (1,200 mg total) by mouth 2 (two) times daily 20 tablet   0 10/11/2021     levothyroxine (SYNTHROID, LEVOTHROID) 75 MCG tablet   Take 88 mcg by mouth daily   0 10/13/2012     omega-3 fatty acids-fish oil 300-1,000 mg Cap   Take 2 g by mouth.   0       ondansetron (ZOFRAN-ODT) 4 MG TbDi disintegrating tablet    Indications: NON-CHEMOTHERAPY RELATED NAUSEA AND VOMITING Take 2 tablets (8 mg total) by mouth  every 8 (eight) hours as needed for Nausea 12 tablet   0 03/04/2022     rosuvastatin 40 MG tablet   Take 40 mg by mouth nightly   0 02/27/2018     amoxicillin-clavulanate (Augmentin) 875-125 mg Tab per tablet   Take 1 tablet by mouth 2 (two) times daily for 4 days 8 tablet   0 03/26/2022 03/30/2022   hydrOXYzine pamoate (VistariL) 25 MG Cap capsule   Take 1 capsule (25 mg total) by mouth 3 (three) times daily as needed for Itching for up to 3 days 9 capsule   0 03/25/2022 03/28/2022     Ordered Prescriptions  - documented in this encounter  Reconcile with Patient's Chart    Ordered Prescriptions  Prescription Sig Dispensed Refills Start Date End Date   azithromycin (Zithromax) 250 MG Tab tablet   Take 1 tablet (250 mg total) by mouth daily 3 tablet   0 03/26/2022     hydrOXYzine pamoate (VistariL) 25 MG Cap capsule   Take 1 capsule (25 mg total) by mouth 3 (three) times daily as needed for Itching for up to 3 days 9 capsule   0 03/25/2022 03/28/2022   amoxicillin-clavulanate (Augmentin) 875-125 mg Tab per tablet   Take 1 tablet by mouth 2 (two) times daily for 4 days 8 tablet   0 03/26/2022 03/30/2022     Discharge Disposition  - documented in this encounter    Discharge Disposition  Disposition Code Departure Means Destination Comments   Home or Self Care     Home          She has had diarrhea on augmentin and I change her abx. She is better with this and the stool was negative on the cultures for c. Difficile.    She has had a f/u with Dr. daley and he is planning a PET scan and possible EBUS.     Telephone Encounter - Horace Daley MD - 03/31/2022 3:42 PM CDT  Formatting of this note might be different from the original.  Conveyed results of Endobronchial biopsy. Clearly stated to her that there was no evidence of malignancy. She is scheduled for a PET scan next week. Will review PET scan and consider EBUS if necessary  Electronically signed by Horace Daley MD at 03/31/2022 3:45 PM CDT      Past Medical  History:  Past Medical History:   Diagnosis Date    Abdominal aortic aneurysm     Bile duct obstruction     per pt/ has seen MD    Cancer of upper lobe of right lung 2018    She had a resection of her right upper lobe due to a cancer at [facility] and she reports that the nodes were all negative.   She is not needing chemo or radiation.     Cataract     CKD (chronic kidney disease) stage 3, GFR 30-59 ml/min     Dr. Philippe    Colon polyp     hyperplastic     COPD (chronic obstructive pulmonary disease)     COPD with acute exacerbation     The patient presents with COPD.  The patient denies chest pain, palpitations, shortness of breath, difficulty breathing, and wheezing.  The patient feels that the pattern of symptoms is stable.  Current treatment has included albuterol inhaler.       Diplopia     DJD (degenerative joint disease), lumbar     HTN (hypertension)     Hyperlipidemia     Hypothyroidism     Lung cancer     right    Obstruction of kidney     per pt/ pt does not know which kidney/ pt has appt to see nephrologist on 7/3    Osteopenia     Posterior vitreous detachment     Posterior vitreous detachment of right eye     Thyroiditis     positive thyroperoxidase antibodies    Trochanteric bursitis      Past Surgical History:   Procedure Laterality Date    CARPAL TUNNEL RELEASE      left     SECTION, CLASSIC      x2    ESOPHAGOGASTRODUODENOSCOPY Left 2019    Procedure: EGD (ESOPHAGOGASTRODUODENOSCOPY);  Surgeon: Nicola Her MD;  Location: [facility];  Service: Endoscopy;  Laterality: Left;    LUNG REMOVAL, PARTIAL Right     [facility]     Review of patient's allergies indicates:   Allergen Reactions    Amoxicillin Palpitations     Current Outpatient Medications on File Prior to Visit   Medication Sig Dispense Refill    albuterol (ACCUNEB) 0.63 mg/3 mL Nebu Take 3 mLs (0.63 mg total) by nebulization 4 (four) times daily. 360 vial 3     calcium carbonate (OS-PAPITO) 500 mg calcium (1,250 mg) tablet Take 1 tablet by mouth once daily.       cefaclor (CECLOR) 500 mg Cap Take 1 capsule (500 mg total) by mouth 3 (three) times daily. for 10 days 30 capsule 0    clotrimazole-betamethasone 1-0.05% (LOTRISONE) cream apply topically twice daily as needed for itching      diltiaZEM (CARDIZEM CD) 240 MG 24 hr capsule TAKE 1 CAPSULE BY MOUTH 2 TIMES DAILY. 180 capsule 0    diphenoxylate-atropine 2.5-0.025 mg (LOMOTIL) 2.5-0.025 mg per tablet Take 1 tablet by mouth 4 (four) times daily as needed for Diarrhea. 20 tablet 0    docusate sodium (COLACE) 100 MG capsule Take 1 capsule (100 mg total) by mouth 2 (two) times daily.  0    etodolac (LODINE) 400 MG tablet Take 1 tablet (400 mg total) by mouth 2 (two) times daily as needed (foot pain). 20 tablet 11    fish oil-omega-3 fatty acids 300-1,000 mg capsule Take 1 capsule by mouth once daily.       FLUoxetine 10 MG capsule Take 1 capsule (10 mg total) by mouth once daily. 30 capsule 11    fluticasone propionate (FLONASE) 50 mcg/actuation nasal spray 2 sprays (100 mcg total) by Each Nostril route once daily. 16 g 11    guaiFENesin (MUCINEX) 600 mg 12 hr tablet Take 1 tablet (600 mg total) by mouth 2 (two) times daily. 20 tablet 0    ipratropium (ATROVENT) 0.02 % nebulizer solution Take 500 mcg by nebulization 4 (four) times daily.       levothyroxine (SYNTHROID) 88 MCG tablet TAKE 1 TABLET (88 MCG TOTAL) BY MOUTH ONCE DAILY 90 tablet 0    multivitamin (THERAGRAN) per tablet Take 1 tablet by mouth once daily.      ondansetron (ZOFRAN-ODT) 4 MG TbDL       rosuvastatin (CRESTOR) 40 MG Tab Take 1 tablet (40 mg total) by mouth once daily. 90 tablet 0    budesonide-formoterol 160-4.5 mcg (SYMBICORT) 160-4.5 mcg/actuation HFAA Inhale 2 puffs into the lungs every 12 (twelve) hours. Controller 1 Inhaler 2    triamcinolone acetonide 0.5% (KENALOG) 0.5 % Crea Apply topically 2 (two) times daily. for 10 days 30  "g 0     No current facility-administered medications on file prior to visit.     Social History     Socioeconomic History    Marital status:      Spouse name: Ibrahima    Number of children: 2   Tobacco Use    Smoking status: Former Smoker     Packs/day: 1.00     Years: 45.00     Pack years: 45.00     Types: Cigarettes     Quit date: 1/1/2007     Years since quitting: 15.2    Smokeless tobacco: Never Used   Substance and Sexual Activity    Alcohol use: No    Drug use: No    Sexual activity: Never     Partners: Male   Social History Narrative    Lives alone     Family History   Problem Relation Age of Onset    Coronary artery disease Mother     Diabetes Mother     Amblyopia Neg Hx     Blindness Neg Hx     Cancer Neg Hx     Cataracts Neg Hx     Glaucoma Neg Hx     Hypertension Neg Hx     Macular degeneration Neg Hx     Retinal detachment Neg Hx     Strabismus Neg Hx     Stroke Neg Hx     Thyroid disease Neg Hx        Review of Systems   Constitutional: Negative for fever.   Respiratory: Positive for cough.    Cardiovascular: Negative for chest pain.   Gastrointestinal: Negative for abdominal pain.       Objective:     BP (!) 144/70   Pulse 69   Temp 97.9 °F (36.6 °C)   Ht 5' 1" (1.549 m)   Wt 52.6 kg (116 lb)   BMI 21.92 kg/m²     Physical Exam  Vitals reviewed.   Constitutional:       General: She is not in acute distress.     Appearance: She is well-developed. She is not diaphoretic.   HENT:      Head: Normocephalic and atraumatic.      Right Ear: External ear normal.      Left Ear: External ear normal.      Nose: Nose normal.      Mouth/Throat:      Pharynx: No oropharyngeal exudate.   Eyes:      General:         Right eye: No discharge.         Left eye: No discharge.      Conjunctiva/sclera: Conjunctivae normal.      Pupils: Pupils are equal, round, and reactive to light.   Neck:      Thyroid: No thyromegaly.      Vascular: No JVD.      Trachea: No tracheal deviation. "   Cardiovascular:      Rate and Rhythm: Normal rate and regular rhythm.      Heart sounds: Normal heart sounds.   Pulmonary:      Effort: No tachypnea, accessory muscle usage or respiratory distress.      Breath sounds: No stridor. No wheezing or rales.   Chest:      Chest wall: No tenderness.   Musculoskeletal:      Cervical back: Normal range of motion and neck supple.   Lymphadenopathy:      Cervical: No cervical adenopathy.       Assessment:     1. Pneumonia due to infectious organism, unspecified laterality, unspecified part of lung    2. Malignant neoplasm of lung, unspecified laterality, unspecified part of lung        Plan:     Problem List Items Addressed This Visit    None     Visit Diagnoses     Pneumonia due to infectious organism, unspecified laterality, unspecified part of lung    -  Primary    Malignant neoplasm of lung, unspecified laterality, unspecified part of lung            No follow-ups on file.  Get her PET Scan and Dr. Bryan is going to consider a PET scan after this.  Continue meds.

## 2022-04-04 LAB — O+P STL MICRO: NORMAL

## 2022-04-05 DIAGNOSIS — Z71.89 COMPLEX CARE COORDINATION: ICD-10-CM

## 2022-04-20 ENCOUNTER — TELEPHONE (OUTPATIENT)
Dept: FAMILY MEDICINE | Facility: CLINIC | Age: 82
End: 2022-04-20
Payer: COMMERCIAL

## 2022-04-20 NOTE — TELEPHONE ENCOUNTER
----- Message from Mabel Paz sent at 4/20/2022  4:00 PM CDT -----  Contact: PT       Who Called: Gloria      Does the patient know what this is regarding? Eye surgery clearance paperwork not approved yet-    Would the patient rather a call back or a response via [facility]?  Callback   Best Call Back Number: .[phone] (home)         Additional Information:

## 2022-04-25 ENCOUNTER — HOSPITAL ENCOUNTER (OUTPATIENT)
Dept: RADIOLOGY | Facility: HOSPITAL | Age: 82
Discharge: HOME | End: 2022-04-25
Attending: NURSE PRACTITIONER
Payer: MEDICARE

## 2022-04-25 ENCOUNTER — TELEPHONE (OUTPATIENT)
Dept: FAMILY MEDICINE | Facility: CLINIC | Age: 82
End: 2022-04-25

## 2022-04-25 ENCOUNTER — OFFICE VISIT (OUTPATIENT)
Dept: FAMILY MEDICINE | Facility: CLINIC | Age: 82
End: 2022-04-25
Payer: MEDICARE

## 2022-04-25 VITALS
SYSTOLIC BLOOD PRESSURE: 126 MMHG | HEART RATE: 81 BPM | DIASTOLIC BLOOD PRESSURE: 68 MMHG | WEIGHT: 118.13 LBS | OXYGEN SATURATION: 95 % | BODY MASS INDEX: 22.3 KG/M2 | HEIGHT: 61 IN

## 2022-04-25 DIAGNOSIS — C34.11 MALIGNANT NEOPLASM OF UPPER LOBE OF RIGHT LUNG: ICD-10-CM

## 2022-04-25 DIAGNOSIS — E11.22 TYPE 2 DIABETES MELLITUS WITH STAGE 3A CHRONIC KIDNEY DISEASE, WITHOUT LONG-TERM CURRENT USE OF INSULIN: ICD-10-CM

## 2022-04-25 DIAGNOSIS — Z01.818 PREOPERATIVE CLEARANCE: ICD-10-CM

## 2022-04-25 DIAGNOSIS — J44.1 CHRONIC OBSTRUCTIVE PULMONARY DISEASE WITH ACUTE EXACERBATION: ICD-10-CM

## 2022-04-25 DIAGNOSIS — Z01.818 PREOPERATIVE CLEARANCE: Primary | ICD-10-CM

## 2022-04-25 DIAGNOSIS — N18.31 STAGE 3A CHRONIC KIDNEY DISEASE: ICD-10-CM

## 2022-04-25 DIAGNOSIS — N18.31 TYPE 2 DIABETES MELLITUS WITH STAGE 3A CHRONIC KIDNEY DISEASE, WITHOUT LONG-TERM CURRENT USE OF INSULIN: ICD-10-CM

## 2022-04-25 PROCEDURE — 99215 OFFICE O/P EST HI 40 MIN: CPT | Mod: PBBFAC,PO,25 | Performed by: NURSE PRACTITIONER

## 2022-04-25 PROCEDURE — 93005 ELECTROCARDIOGRAM TRACING: CPT | Mod: PBBFAC,PO | Performed by: INTERNAL MEDICINE

## 2022-04-25 PROCEDURE — 99999 PR PBB SHADOW E&M-EST. PATIENT-LVL V: ICD-10-PCS | Mod: PBBFAC,,, | Performed by: NURSE PRACTITIONER

## 2022-04-25 PROCEDURE — 71046 X-RAY EXAM CHEST 2 VIEWS: CPT | Mod: 26,,, | Performed by: RADIOLOGY

## 2022-04-25 PROCEDURE — 71046 X-RAY EXAM CHEST 2 VIEWS: CPT | Mod: TC,PO

## 2022-04-25 PROCEDURE — 71046 XR CHEST PA AND LATERAL: ICD-10-PCS | Mod: 26,,, | Performed by: RADIOLOGY

## 2022-04-25 PROCEDURE — 93010 EKG 12-LEAD: ICD-10-PCS | Mod: S$PBB,,, | Performed by: INTERNAL MEDICINE

## 2022-04-25 PROCEDURE — 93010 ELECTROCARDIOGRAM REPORT: CPT | Mod: S$PBB,,, | Performed by: INTERNAL MEDICINE

## 2022-04-25 PROCEDURE — 99214 OFFICE O/P EST MOD 30 MIN: CPT | Mod: S$PBB,,, | Performed by: NURSE PRACTITIONER

## 2022-04-25 PROCEDURE — 99214 PR OFFICE/OUTPT VISIT, EST, LEVL IV, 30-39 MIN: ICD-10-PCS | Mod: S$PBB,,, | Performed by: NURSE PRACTITIONER

## 2022-04-25 PROCEDURE — 99999 PR PBB SHADOW E&M-EST. PATIENT-LVL V: CPT | Mod: PBBFAC,,, | Performed by: NURSE PRACTITIONER

## 2022-04-25 NOTE — TELEPHONE ENCOUNTER
----- Message from Rafal Huitron sent at 4/25/2022  2:59 PM CDT -----  Contact: zahida Chaparro is calling to see if she has been cleared for surgery on tomorrow. Please call her back at [phone].            Thanks  DD

## 2022-04-25 NOTE — TELEPHONE ENCOUNTER
Per Farida Calderon, NP staff patient will not be cleared until her labs are received. Patient was informed of this information on the Emergency line within the last hour.

## 2022-04-25 NOTE — PROGRESS NOTES
Assessment/Plan:  Problem List Items Addressed This Visit        Pulmonary    COPD (chronic obstructive pulmonary disease)    Overview     The patient presents with COPD.  The patient denies chest pain, palpitations, shortness of breath, difficulty breathing, and wheezing.  The patient feels that the pattern of symptoms is stable.  Current treatment has included the following medications:    Your Quick Relief Medication: (7000h ago through 1000h from now)        Stop Sig     albuterol (ACCUNEB) 0.63 mg/3 mL Nebu  4 times daily        Sig: Take 3 mLs (0.63 mg total) by nebulization 4 (four) times daily.        -- Take 3 mLs (0.63 mg total) by nebulization 4 (four) times daily.         Your Controller Medications: (7000h ago through 1000h from now)        Stop Sig     budesonide-formoterol 160-4.5 mcg (SYMBICORT) 160-4.5 mcg/actuation HFAA  Every 12 hours        Sig: Inhale 2 puffs into the lungs every 12 (twelve) hours. Controller        06/25 7519 Inhale 2 puffs into the lungs every 12 (twelve) hours. Controller                              Renal/    CKD (chronic kidney disease) stage 3, GFR 30-59 ml/min    Overview     Gloria Hale has an Estimated Glumerular Filtration Rate (EGFR) between 30 and 59 consistent with the definition of chronic kidney disease stage 3.    Reviewed last labs at [facility].    Lab Results   Component Value Date    CREATININE 1.2 01/26/2021    BUN 14 01/26/2021     01/26/2021    K 3.6 01/26/2021     01/26/2021    CO2 25 01/26/2021       The patient's chronic kidney disease stage 3 was monitored, evaluated, addressed and/or treated.                  Oncology    Malignant neoplasm of right lung    Overview       Lung nodule/ adenocarcinoma of the lung-biopsy proven adenocarcinoma and status post resection of right upper lobe in November 2017. This would be deemed a stage I cancer. She had a negative CT last year with Dr. daley and she plans to continue following up with him.             Current Assessment & Plan     Recommend follow up with pulmonology. Repeat CXR today.               Endocrine    Type 2 diabetes mellitus with stage 3a chronic kidney disease, without long-term current use of insulin    Overview     The patient presents with diabetes.  The patient denies polyuria, polydipsia, polyphagia, hypoglycemia and paresthesias.  The patient's glucose control has been good.  Home glucose averages are routinely checked.  The patient is without retinopathy currently.  The patient has no history of neuropathy.  The patient currently complains of no podiatric problems.  The patient has excellent compliance.  Hemoglobin A1C   Date Value Ref Range Status   04/10/2021 6.6 (H) 4.6 - 6.2 % Final   01/06/2020 6.6 (H) 4.0 - 5.6 % Final     Comment:     ADA Screening Guidelines:  5.7-6.4%  Consistent with prediabetes  >or=6.5%  Consistent with diabetes  High levels of fetal hemoglobin interfere with the HbA1C  assay. Heterozygous hemoglobin variants (HbS, HgC, etc)do  not significantly interfere with this assay.   However, presence of multiple variants may affect accuracy.     07/22/2019 6.3 (H) 0.0 - 5.6 % Final     Comment:     Reference Interval:  5.0 - 5.6 Normal   5.7 - 6.4 High Risk   > 6.5 Diabetic    Hgb A1c results are standardized based on the (NGSP) National   Glycohemoglobin Standardization Program.    Hemoglobin A1C levels are related to mean serum/plasma glucose   during the preceding 2-3 months.        11/02/2018 6.4 (H) 4.0 - 5.6 % Final     Comment:     ADA Screening Guidelines:  5.7-6.4%  Consistent with prediabetes  >or=6.5%  Consistent with diabetes  High levels of fetal hemoglobin interfere with the HbA1C  assay. Heterozygous hemoglobin variants (HbS, HgC, etc)do  not significantly interfere with this assay.   However, presence of multiple variants may affect accuracy.       No results found for: MICROALBUR, [id number]  Diabetes Management Status    Statin: Taking  ACE/ARB: Not  taking    Screening or Prevention Patient's value Goal Complete/Controlled?   HgA1C Testing and Control   Lab Results   Component Value Date    HGBA1C 6.6 (H) 04/10/2021      Annually/Less than 8% Yes   Lipid profile : 01/26/2021 Annually No   LDL control Lab Results   Component Value Date    LDLCALC 54.0 (L) 01/26/2021    Annually/Less than 100 mg/dl  No   Nephropathy screening No results found for: LABMICR  Lab Results   Component Value Date    PROTEINUA 1+ (A) 06/17/2021     Lab Results   Component Value Date    UTPCR 0.92 (H) 05/05/2021      Annually Yes   Blood pressure BP Readings from Last 1 Encounters:   02/16/22 136/60    Less than 140/90 No   Dilated retinal exam Most Recent Eye Exam Date: Not Found Annually No   Foot exam   Most Recent Foot Exam Date: Not Found Annually No                Current Assessment & Plan     -condition is currently controlled  -see diabetic health maintenance listed below  -on statin: Yes  -on ACE-I/ARB: Yes  -counseling provided on importance of diabetic diet and medication compliance in order to treat diabetes  -discussed diabetes disease course and potential complications    Lab Results   Component Value Date    HGBA1C 5.9 (H) 02/16/2022                Other Visit Diagnoses     Preoperative clearance    -  Primary    Relevant Orders    CBC Without Differential    Comprehensive Metabolic Panel    IN OFFICE EKG 12-LEAD (to Muse)    X-Ray Chest PA And Lateral      Preop testing is stable. No acute abnormalities. Patient is cleared for cataract surgery.     Follow up in about 1 month (around 5/25/2022), or if symptoms worsen or fail to improve, for Follow up with PCP.    Farida Calderon NP  _____________________________________________________________________________________________________________________________________________________    CC: preop clearance     HPI: Patient is a 81-year-old female who presents in clinic today as an established patient here for preop  clearance.  The patient is here for a preop clearance to have surgery to have a cataract surgery.   The physician that is performing the surgery is Dr. Hays   The surgery is being planned for tomorrow, 4/26/2022.   The patient states that there has not been previous problems with sedation. She has had prior surgeries in the past with no adverse effects from anesthesia.   She is not currently taking a blood thinner.    She has no history of blood clots or bleeding disorders.   Patient is a nonsmoker. No history of DARIO.  No history of MI, PE, DVT, arrhythmia, CHF.  She does see pulmonology for history lung cancer and COPD. Follows with Dr. Bryan.   Chronic conditions have been reviewed and remain stable. Further detail as stated above.     Past Medical History:  Past Medical History:   Diagnosis Date    Abdominal aortic aneurysm     Bile duct obstruction     per pt/ has seen MD    Cancer of upper lobe of right lung 2/26/2018    She had a resection of her right upper lobe due to a cancer at [facility] and she reports that the nodes were all negative.   She is not needing chemo or radiation.     Cataract     CKD (chronic kidney disease) stage 3, GFR 30-59 ml/min     Dr. Philippe    Colon polyp     hyperplastic 2007    COPD (chronic obstructive pulmonary disease)     COPD with acute exacerbation     The patient presents with COPD.  The patient denies chest pain, palpitations, shortness of breath, difficulty breathing, and wheezing.  The patient feels that the pattern of symptoms is stable.  Current treatment has included albuterol inhaler.       Diplopia     DJD (degenerative joint disease), lumbar     HTN (hypertension)     Hyperlipidemia     Hypothyroidism     Lung cancer 2018    right    Obstruction of kidney     per pt/ pt does not know which kidney/ pt has appt to see nephrologist on 7/3    Osteopenia     Posterior vitreous detachment     Posterior vitreous detachment of right eye      Thyroiditis     positive thyroperoxidase antibodies    Trochanteric bursitis      Past Surgical History:   Procedure Laterality Date    CARPAL TUNNEL RELEASE      left     SECTION, CLASSIC      x2    ESOPHAGOGASTRODUODENOSCOPY Left 2019    Procedure: EGD (ESOPHAGOGASTRODUODENOSCOPY);  Surgeon: Nicola Her MD;  Location: [facility];  Service: Endoscopy;  Laterality: Left;    LUNG REMOVAL, PARTIAL Right     [facility]     Review of patient's allergies indicates:   Allergen Reactions    Amoxicillin Palpitations     Social History     Tobacco Use    Smoking status: Former Smoker     Packs/day: 1.00     Years: 45.00     Pack years: 45.00     Types: Cigarettes     Quit date: 2007     Years since quitting: 15.3    Smokeless tobacco: Never Used   Substance Use Topics    Alcohol use: No    Drug use: No     Family History   Problem Relation Age of Onset    Coronary artery disease Mother     Diabetes Mother     Amblyopia Neg Hx     Blindness Neg Hx     Cancer Neg Hx     Cataracts Neg Hx     Glaucoma Neg Hx     Hypertension Neg Hx     Macular degeneration Neg Hx     Retinal detachment Neg Hx     Strabismus Neg Hx     Stroke Neg Hx     Thyroid disease Neg Hx      Current Outpatient Medications on File Prior to Visit   Medication Sig Dispense Refill    albuterol (ACCUNEB) 0.63 mg/3 mL Nebu Take 3 mLs (0.63 mg total) by nebulization 4 (four) times daily. 360 vial 3    budesonide-formoterol 160-4.5 mcg (SYMBICORT) 160-4.5 mcg/actuation HFAA Inhale 2 puffs into the lungs every 12 (twelve) hours. Controller 1 Inhaler 2    calcium carbonate (OS-PAPITO) 500 mg calcium (1,250 mg) tablet Take 1 tablet by mouth once daily.       clotrimazole-betamethasone 1-0.05% (LOTRISONE) cream apply topically twice daily as needed for itching      diltiaZEM (CARDIZEM CD) 240 MG 24 hr capsule TAKE 1 CAPSULE BY MOUTH 2 TIMES DAILY. 180 capsule 0    docusate sodium (COLACE) 100 MG capsule  Take 1 capsule (100 mg total) by mouth 2 (two) times daily.  0    etodolac (LODINE) 400 MG tablet Take 1 tablet (400 mg total) by mouth 2 (two) times daily as needed (foot pain). 20 tablet 11    fish oil-omega-3 fatty acids 300-1,000 mg capsule Take 1 capsule by mouth once daily.       FLUoxetine 10 MG capsule Take 1 capsule (10 mg total) by mouth once daily. 30 capsule 11    fluticasone propionate (FLONASE) 50 mcg/actuation nasal spray 2 sprays (100 mcg total) by Each Nostril route once daily. 16 g 11    guaiFENesin (MUCINEX) 600 mg 12 hr tablet Take 1 tablet (600 mg total) by mouth 2 (two) times daily. 20 tablet 0    ipratropium (ATROVENT) 0.02 % nebulizer solution Take 500 mcg by nebulization 4 (four) times daily.       levothyroxine (SYNTHROID) 88 MCG tablet TAKE 1 TABLET (88 MCG TOTAL) BY MOUTH ONCE DAILY 90 tablet 0    multivitamin (THERAGRAN) per tablet Take 1 tablet by mouth once daily.      ondansetron (ZOFRAN-ODT) 4 MG TbDL       rosuvastatin (CRESTOR) 40 MG Tab Take 1 tablet (40 mg total) by mouth once daily. 90 tablet 0    triamcinolone acetonide 0.5% (KENALOG) 0.5 % Crea Apply topically 2 (two) times daily. for 10 days 30 g 0     No current facility-administered medications on file prior to visit.     Review of Systems   Constitutional: Negative for appetite change, chills, fatigue and fever.   HENT: Negative for congestion, rhinorrhea and sore throat.    Eyes: Negative for visual disturbance.   Respiratory: Positive for shortness of breath (chronic, with exertion). Negative for cough.    Cardiovascular: Negative for chest pain, palpitations and leg swelling.   Gastrointestinal: Negative for abdominal pain, diarrhea and vomiting.   Genitourinary: Negative for dysuria.   Musculoskeletal: Positive for arthralgias and gait problem. Negative for myalgias.   Skin: Negative for rash and wound.   Neurological: Positive for weakness. Negative for dizziness and headaches.   Psychiatric/Behavioral:  "Negative for behavioral problems. The patient is not nervous/anxious.      Vitals:    04/25/22 0827   BP: 126/68   BP Location: Right arm   Pulse: 81   SpO2: 95%   Weight: 53.6 kg (118 lb 1.6 oz)   Height: 5' 1" (1.549 m)     Wt Readings from Last 3 Encounters:   04/25/22 53.6 kg (118 lb 1.6 oz)   04/01/22 52.6 kg (116 lb)   02/16/22 56.2 kg (124 lb)     Physical Exam  Vitals reviewed.   Constitutional:       General: She is not in acute distress.     Appearance: Normal appearance. She is well-developed. She is not ill-appearing or diaphoretic.   HENT:      Head: Normocephalic and atraumatic.      Right Ear: External ear normal.      Left Ear: External ear normal.      Nose: Nose normal.   Eyes:      Extraocular Movements: Extraocular movements intact.      Conjunctiva/sclera: Conjunctivae normal.      Pupils: Pupils are equal, round, and reactive to light.   Neck:      Thyroid: No thyromegaly.      Vascular: No JVD.      Trachea: No tracheal deviation.   Cardiovascular:      Rate and Rhythm: Normal rate and regular rhythm.      Heart sounds: Normal heart sounds.   Pulmonary:      Effort: Pulmonary effort is normal. No tachypnea, accessory muscle usage or respiratory distress.      Breath sounds: Normal breath sounds. No wheezing or rales.   Abdominal:      General: Abdomen is flat. There is no distension.   Musculoskeletal:         General: Normal range of motion.      Cervical back: Normal range of motion and neck supple.   Lymphadenopathy:      Cervical: No cervical adenopathy.   Skin:     General: Skin is warm and dry.      Coloration: Skin is not pale.      Findings: No rash.   Neurological:      Mental Status: She is alert and oriented to person, place, and time. Mental status is at baseline.      Motor: Weakness (generalized) present.      Gait: Gait abnormal (using rolling walker).   Psychiatric:         Mood and Affect: Mood normal.         Speech: Speech normal.         Behavior: Behavior normal. Behavior " is cooperative.       Health Maintenance   Topic Date Due    Colonoscopy  06/19/2019    DEXA Scan  01/06/2022    Lipid Panel  02/16/2023    TETANUS VACCINE  12/14/2025

## 2022-04-25 NOTE — TELEPHONE ENCOUNTER
----- Message from Mariella Diana sent at 4/25/2022  9:10 AM CDT -----  Patient wants to know if Dr. Norman received her paperwork

## 2022-04-25 NOTE — ASSESSMENT & PLAN NOTE
-condition is currently controlled  -see diabetic health maintenance listed below  -on statin: Yes  -on ACE-I/ARB: Yes  -counseling provided on importance of diabetic diet and medication compliance in order to treat diabetes  -discussed diabetes disease course and potential complications    Lab Results   Component Value Date    HGBA1C 5.9 (H) 02/16/2022

## 2022-04-26 DIAGNOSIS — E03.9 ACQUIRED HYPOTHYROIDISM: ICD-10-CM

## 2022-04-26 RX ORDER — LEVOTHYROXINE SODIUM 88 UG/1
88 TABLET ORAL DAILY
Qty: 90 TABLET | Refills: 3 | Status: SHIPPED | OUTPATIENT
Start: 2022-04-26 | End: 2022-08-16

## 2022-04-26 NOTE — TELEPHONE ENCOUNTER
----- Message from Joseluis Norman MD sent at 4/25/2022  9:24 PM CDT -----  Send a prescription for the patient's thyroid medication (synthroid or levothyroxine) at the current dose in the medcard to the pharmacy and refill it x 1 year.   Health Maintenance items that are due to be done before the end of the year include the following list. Please let me know if I can schedule these.   Shingles Vaccine(1 of 2) Never done  Colonoscopy due on 06/19/2019  Influenza Vaccine(1) due on 09/01/2021  DEXA Scan due on 01/06/2022  Foot Exam due on 05/17/2022  Dr. Joseluis Norman  
No new care gaps identified.  Powered by [employer]. Reference number: [id number].   4/26/2022 9:06:11 AM CDT  
---

## 2022-04-28 DIAGNOSIS — I10 ESSENTIAL HYPERTENSION: ICD-10-CM

## 2022-04-28 DIAGNOSIS — I47.19 ECTOPIC ATRIAL TACHYCARDIA: ICD-10-CM

## 2022-04-28 DIAGNOSIS — E78.5 DYSLIPIDEMIA: Chronic | ICD-10-CM

## 2022-04-28 RX ORDER — DILTIAZEM HYDROCHLORIDE 240 MG/1
CAPSULE, COATED, EXTENDED RELEASE ORAL
Qty: 180 CAPSULE | Refills: 3 | Status: SHIPPED | OUTPATIENT
Start: 2022-04-28

## 2022-04-28 RX ORDER — ROSUVASTATIN CALCIUM 40 MG/1
40 TABLET, COATED ORAL DAILY
Qty: 90 TABLET | Refills: 3 | Status: SHIPPED | OUTPATIENT
Start: 2022-04-28

## 2022-04-28 NOTE — TELEPHONE ENCOUNTER
Refill Authorization Note   Gloria Hale  is requesting a refill authorization.  Brief Assessment and Rationale for Refill:  Approve     Medication Therapy Plan:       Medication Reconciliation Completed: No   Comments:     No Care Gaps recommended.     Note composed:4:55 PM 04/28/2022

## 2022-04-28 NOTE — TELEPHONE ENCOUNTER
No new care gaps identified.  Powered by [employer]. Reference number: [id number].   4/28/2022 4:46:40 PM CDT

## 2022-04-29 ENCOUNTER — TELEPHONE (OUTPATIENT)
Dept: FAMILY MEDICINE | Facility: CLINIC | Age: 82
End: 2022-04-29
Payer: COMMERCIAL

## 2022-04-29 NOTE — TELEPHONE ENCOUNTER
----- Message from Cookie Hugo sent at 4/29/2022  2:36 PM CDT -----  Pt would like the nurse to call her back at [phone]. Thx. EL

## 2022-04-30 NOTE — TELEPHONE ENCOUNTER
I did not do her EGD.  She needs to call the performing physician to determine that.  Can you find out who did it because we will need a copy of the report but she still needs to follow up with the performing physician as per his recommendations at the time of the scope.  If it was the local GI group, she will probably need to see them because they often will not give results over the phone.

## 2022-05-02 NOTE — TELEPHONE ENCOUNTER
Patient advised. She isn't sure off hand who the provider was but will try and figure this out and call us back with the information.

## 2022-06-03 RX ORDER — FLUTICASONE PROPIONATE 50 MCG
SPRAY, SUSPENSION (ML) NASAL
Qty: 48 G | Refills: 2 | Status: SHIPPED | OUTPATIENT
Start: 2022-06-03

## 2022-06-04 NOTE — TELEPHONE ENCOUNTER
No new care gaps identified.  [facility] Embedded Care Gaps. Reference number: [id number]. 6/03/2022   8:40:31 AM CDT  
Refill Authorization Note   Gloria Hale  is requesting a refill authorization.  Brief Assessment and Rationale for Refill:  Approve     Medication Therapy Plan:       Medication Reconciliation Completed: No   Comments:     No Care Gaps recommended.     Note composed:8:01 PM 06/03/2022            
Adult

## 2022-07-21 ENCOUNTER — LAB VISIT (OUTPATIENT)
Dept: LAB | Facility: HOSPITAL | Age: 82
End: 2022-07-21
Attending: INTERNAL MEDICINE
Payer: MEDICARE

## 2022-07-21 DIAGNOSIS — N18.31 STAGE 3A CHRONIC KIDNEY DISEASE: ICD-10-CM

## 2022-07-21 LAB
ALBUMIN SERPL BCP-MCNC: 3.2 G/DL (ref 3.5–5.2)
ANION GAP SERPL CALC-SCNC: 14 MMOL/L (ref 8–16)
BASOPHILS # BLD AUTO: 0.03 K/UL (ref 0–0.2)
BASOPHILS NFR BLD: 0.4 % (ref 0–1.9)
BUN SERPL-MCNC: 26 MG/DL (ref 8–23)
CALCIUM SERPL-MCNC: 9.4 MG/DL (ref 8.7–10.5)
CHLORIDE SERPL-SCNC: 105 MMOL/L (ref 95–110)
CO2 SERPL-SCNC: 22 MMOL/L (ref 23–29)
CREAT SERPL-MCNC: 1.3 MG/DL (ref 0.5–1.4)
DIFFERENTIAL METHOD: ABNORMAL
EOSINOPHIL # BLD AUTO: 0.5 K/UL (ref 0–0.5)
EOSINOPHIL NFR BLD: 6.2 % (ref 0–8)
ERYTHROCYTE [DISTWIDTH] IN BLOOD BY AUTOMATED COUNT: 18.3 % (ref 11.5–14.5)
EST. GFR  (AFRICAN AMERICAN): 43.8 ML/MIN/1.73 M^2
EST. GFR  (NON AFRICAN AMERICAN): 38 ML/MIN/1.73 M^2
GLUCOSE SERPL-MCNC: 168 MG/DL (ref 70–110)
HCT VFR BLD AUTO: 34.2 % (ref 37–48.5)
HGB BLD-MCNC: 10.3 G/DL (ref 12–16)
IMM GRANULOCYTES # BLD AUTO: 0.02 K/UL (ref 0–0.04)
IMM GRANULOCYTES NFR BLD AUTO: 0.2 % (ref 0–0.5)
LYMPHOCYTES # BLD AUTO: 1.5 K/UL (ref 1–4.8)
LYMPHOCYTES NFR BLD: 19.1 % (ref 18–48)
MCH RBC QN AUTO: 24.6 PG (ref 27–31)
MCHC RBC AUTO-ENTMCNC: 30.1 G/DL (ref 32–36)
MCV RBC AUTO: 82 FL (ref 82–98)
MONOCYTES # BLD AUTO: 1.1 K/UL (ref 0.3–1)
MONOCYTES NFR BLD: 14 % (ref 4–15)
NEUTROPHILS # BLD AUTO: 4.8 K/UL (ref 1.8–7.7)
NEUTROPHILS NFR BLD: 60.1 % (ref 38–73)
NRBC BLD-RTO: 0 /100 WBC
PHOSPHATE SERPL-MCNC: 2.8 MG/DL (ref 2.7–4.5)
PLATELET # BLD AUTO: 328 K/UL (ref 150–450)
PMV BLD AUTO: 10.1 FL (ref 9.2–12.9)
POTASSIUM SERPL-SCNC: 4.1 MMOL/L (ref 3.5–5.1)
PTH-INTACT SERPL-MCNC: 38.7 PG/ML (ref 9–77)
RBC # BLD AUTO: 4.18 M/UL (ref 4–5.4)
SODIUM SERPL-SCNC: 141 MMOL/L (ref 136–145)
WBC # BLD AUTO: 8.02 K/UL (ref 3.9–12.7)

## 2022-07-21 PROCEDURE — 83970 ASSAY OF PARATHORMONE: CPT | Performed by: INTERNAL MEDICINE

## 2022-07-21 PROCEDURE — 36415 COLL VENOUS BLD VENIPUNCTURE: CPT | Mod: PO | Performed by: INTERNAL MEDICINE

## 2022-07-21 PROCEDURE — 80069 RENAL FUNCTION PANEL: CPT | Performed by: INTERNAL MEDICINE

## 2022-07-21 PROCEDURE — 85025 COMPLETE CBC W/AUTO DIFF WBC: CPT | Performed by: INTERNAL MEDICINE

## 2022-07-28 ENCOUNTER — OFFICE VISIT (OUTPATIENT)
Dept: NEPHROLOGY | Facility: CLINIC | Age: 82
End: 2022-07-28
Payer: MEDICARE

## 2022-07-28 VITALS
BODY MASS INDEX: 21.74 KG/M2 | HEIGHT: 61 IN | HEART RATE: 81 BPM | DIASTOLIC BLOOD PRESSURE: 82 MMHG | OXYGEN SATURATION: 98 % | WEIGHT: 115.13 LBS | SYSTOLIC BLOOD PRESSURE: 118 MMHG

## 2022-07-28 DIAGNOSIS — I10 PRIMARY HYPERTENSION: ICD-10-CM

## 2022-07-28 DIAGNOSIS — J43.2 CENTRILOBULAR EMPHYSEMA: ICD-10-CM

## 2022-07-28 DIAGNOSIS — R35.0 URINARY FREQUENCY: ICD-10-CM

## 2022-07-28 DIAGNOSIS — J44.1 CHRONIC OBSTRUCTIVE PULMONARY DISEASE WITH ACUTE EXACERBATION: ICD-10-CM

## 2022-07-28 DIAGNOSIS — I10 ESSENTIAL HYPERTENSION: ICD-10-CM

## 2022-07-28 DIAGNOSIS — M81.0 AGE-RELATED OSTEOPOROSIS WITHOUT CURRENT PATHOLOGICAL FRACTURE: ICD-10-CM

## 2022-07-28 DIAGNOSIS — E03.9 ACQUIRED HYPOTHYROIDISM: ICD-10-CM

## 2022-07-28 DIAGNOSIS — C34.11 CANCER OF UPPER LOBE OF RIGHT LUNG: ICD-10-CM

## 2022-07-28 DIAGNOSIS — N18.31 STAGE 3A CHRONIC KIDNEY DISEASE: Primary | ICD-10-CM

## 2022-07-28 DIAGNOSIS — Z87.891 FORMER SMOKER: ICD-10-CM

## 2022-07-28 DIAGNOSIS — E78.5 DYSLIPIDEMIA: ICD-10-CM

## 2022-07-28 DIAGNOSIS — N18.31 TYPE 2 DIABETES MELLITUS WITH STAGE 3A CHRONIC KIDNEY DISEASE, WITHOUT LONG-TERM CURRENT USE OF INSULIN: ICD-10-CM

## 2022-07-28 DIAGNOSIS — E11.22 TYPE 2 DIABETES MELLITUS WITH STAGE 3A CHRONIC KIDNEY DISEASE, WITHOUT LONG-TERM CURRENT USE OF INSULIN: ICD-10-CM

## 2022-07-28 DIAGNOSIS — M16.12 PRIMARY OSTEOARTHRITIS OF LEFT HIP: ICD-10-CM

## 2022-07-28 DIAGNOSIS — Z86.010 HISTORY OF COLON POLYPS: ICD-10-CM

## 2022-07-28 PROCEDURE — 99215 OFFICE O/P EST HI 40 MIN: CPT | Mod: S$PBB,,, | Performed by: INTERNAL MEDICINE

## 2022-07-28 PROCEDURE — 99999 PR PBB SHADOW E&M-EST. PATIENT-LVL V: CPT | Mod: PBBFAC,,, | Performed by: INTERNAL MEDICINE

## 2022-07-28 PROCEDURE — 99999 PR PBB SHADOW E&M-EST. PATIENT-LVL V: ICD-10-PCS | Mod: PBBFAC,,, | Performed by: INTERNAL MEDICINE

## 2022-07-28 PROCEDURE — 99215 PR OFFICE/OUTPT VISIT, EST, LEVL V, 40-54 MIN: ICD-10-PCS | Mod: S$PBB,,, | Performed by: INTERNAL MEDICINE

## 2022-07-28 PROCEDURE — 99215 OFFICE O/P EST HI 40 MIN: CPT | Mod: PBBFAC,PO | Performed by: INTERNAL MEDICINE

## 2022-07-28 NOTE — PATIENT INSTRUCTIONS
Kidney function is stable--good news    Will refer to Urology at [facility] in October    Medication Reconciliation     List of medications patient is currently taking is complete. Source of information:   1. Conversation with patient at bedside  2. EPIC records        Notes regarding home medications:  1. Patient received all of her morning home medications today. 2. Per patient, metoprolol currently on hold for recent low Bps.  She also reports being taken off atorvastatin though this is marked \"taking\" in PCP note 5/6/21      Ubaldo April, Pharmacy Intern  5/11/2021 8:06 PM

## 2022-07-28 NOTE — PROGRESS NOTES
Subjective:       Patient ID: Gloria Hale is a 81 y.o. White female who presents for follow up evaluation of Chronic Kidney Disease    HPI     She is referred by her PCP for decreased GFR, technically CKD Stage 3    She was acutely ill at [facility] in March 2018 for pneumonia/sepsis. She sustained a CORINE episode likely from ATN as well as volume depletion as she was lying on her sofa for almost two weeks prior to hospital admit with almost no po intake. (She is  and lives alone) Her peak creatinine was 4.8 and at time of discharge it decreased to 2. Her pre hospital creatinine ranged 0.9-1mg/dL. She never required HD during that hospital stay    Her most recent chemistry from late October 2013 revealed a GFR of 39ml/min thus prompting nephrology consult.    She denies foamy urine, no hematuria and no flank pain. No frequent UTIs. She follows a low sodium diet but she admits she has poor po fluid intake. She can develop LE edema at the end of the day and SOB/DOMINGUEZ is chronic from COPD. No NSAID use and no herbal medications. No known family history of kidney disease    Interval history August 2021:   The patient location is: Home  The chief complaint leading to consultation is: CKD  Visit type: audio only  Face to Face time with patient: zero   51 minutes of total time spent on the encounter, which includes face to face time and non-face to face time preparing to see the patient (eg, review of tests), Obtaining and/or reviewing separately obtained history, Documenting clinical information in the electronic or other health record, Independently interpreting results (not separately reported) and communicating results to the patient/family/caregiver, or Care coordination (not separately reported).   Each patient to whom he or she provides medical services by telemedicine is:  (1) informed of the relationship between the physician and patient and the respective role of any other health care provider with respect to  management of the patient; and (2) notified that he or she may decline to receive medical services by telemedicine and may withdraw from such care at any time.  Notes: She feels well in general. Notes nocturia. No new medications    Interval History July 2022: Trouble with breathing and trouble with constipation. Feels a little better. H. Pylori but unable to take ABX. Had significant weight loss, now stabilized. Boost daily. Complains of urinary frequency at night       Review of Systems   Constitutional: Positive for fatigue (chronic). Negative for activity change, appetite change and unexpected weight change.   HENT: Negative for facial swelling.    Respiratory: Negative for cough and shortness of breath.    Cardiovascular: Positive for leg swelling (at end of day). Negative for chest pain.   Gastrointestinal: Positive for constipation (uses stool softeners).   Genitourinary: Positive for frequency and urgency. Negative for difficulty urinating.   Musculoskeletal: Negative for arthralgias and gait problem (uses walker 99% of time).   Allergic/Immunologic: Positive for immunocompromised state.   Hematological: Bruises/bleeds easily.   Psychiatric/Behavioral: Positive for sleep disturbance (noctruira).       Objective:      Physical Exam  Vitals and nursing note reviewed.   Constitutional:       General: She is not in acute distress.     Appearance: Normal appearance.   Eyes:      General: No scleral icterus.  Cardiovascular:      Rate and Rhythm: Normal rate.   Pulmonary:      Effort: Pulmonary effort is normal. No respiratory distress.   Abdominal:      General: There is no distension.   Musculoskeletal:      Right lower leg: No edema.      Left lower leg: No edema.   Skin:     General: Skin is warm and dry.   Neurological:      Mental Status: She is alert and oriented to person, place, and time. Mental status is at baseline.   Psychiatric:         Thought Content: Thought content normal.         Assessment:        1. Stage 3a chronic kidney disease    2. Essential hypertension    3. Centrilobular emphysema    4. Chronic obstructive pulmonary disease with acute exacerbation    5. Dyslipidemia    6. Acquired hypothyroidism    7. Former smoker, quit around 2000    8. Age-related osteoporosis without current pathological fracture    9. Primary osteoarthritis of left hip    10. History of colon polyps    11. Type 2 diabetes mellitus with stage 3a chronic kidney disease, without long-term current use of insulin    12. Cancer of upper lobe of right lung    13. Primary hypertension        Plan:           CKD Stage 3   - Prior to her acute illness in March her kidney function was acceptable at Stage 2 given her age, subsequently her GFR is lower and I suspect she incurred some permament damage from her CORINE episode.  - stable kidney function  - Patient was advised to avoid NSAIDs, continue a low sodium diet, and ensure hydration    HTN  - controlled    Mineral and Bone Disease  - PTH at goal    DM 2  - controlled (5.9 in feb 2022)    LUTS  - refer to Urology [facility] October for Urian Incontinence     RTC 6mo  51 minutes of total time spent on the encounter, which includes face to face time and non-face to face time preparing to see the patient (eg, review of tests), Obtaining and/or reviewing separately obtained history, Documenting clinical information in the electronic or other health record, Independently interpreting results (not separately reported) and communicating results to the patient/family/caregiver, or Care coordination (not separately reported).

## 2022-08-01 ENCOUNTER — TELEPHONE (OUTPATIENT)
Dept: NEPHROLOGY | Facility: CLINIC | Age: 82
End: 2022-08-01
Payer: COMMERCIAL

## 2022-08-01 NOTE — TELEPHONE ENCOUNTER
----- Message from Donato Philippe MD sent at 7/30/2022 10:35 PM CDT -----  Refer to Urology [facility] in October APPT  RTC 6mo w labs

## 2022-08-10 ENCOUNTER — LAB VISIT (OUTPATIENT)
Dept: LAB | Facility: HOSPITAL | Age: 82
End: 2022-08-10
Attending: FAMILY MEDICINE
Payer: MEDICARE

## 2022-08-10 DIAGNOSIS — E11.22 TYPE 2 DIABETES MELLITUS WITH STAGE 3A CHRONIC KIDNEY DISEASE, WITHOUT LONG-TERM CURRENT USE OF INSULIN: ICD-10-CM

## 2022-08-10 DIAGNOSIS — N18.31 TYPE 2 DIABETES MELLITUS WITH STAGE 3A CHRONIC KIDNEY DISEASE, WITHOUT LONG-TERM CURRENT USE OF INSULIN: ICD-10-CM

## 2022-08-10 LAB
ESTIMATED AVG GLUCOSE: 126 MG/DL (ref 68–131)
HBA1C MFR BLD: 6 % (ref 4–5.6)

## 2022-08-10 PROCEDURE — 83036 HEMOGLOBIN GLYCOSYLATED A1C: CPT | Performed by: FAMILY MEDICINE

## 2022-08-10 PROCEDURE — 36415 COLL VENOUS BLD VENIPUNCTURE: CPT | Mod: PO | Performed by: FAMILY MEDICINE

## 2022-08-12 ENCOUNTER — TELEPHONE (OUTPATIENT)
Dept: FAMILY MEDICINE | Facility: CLINIC | Age: 82
End: 2022-08-12
Payer: COMMERCIAL

## 2022-08-12 DIAGNOSIS — N18.31 TYPE 2 DIABETES MELLITUS WITH STAGE 3A CHRONIC KIDNEY DISEASE, WITHOUT LONG-TERM CURRENT USE OF INSULIN: Primary | ICD-10-CM

## 2022-08-12 DIAGNOSIS — E11.22 TYPE 2 DIABETES MELLITUS WITH STAGE 3A CHRONIC KIDNEY DISEASE, WITHOUT LONG-TERM CURRENT USE OF INSULIN: Primary | ICD-10-CM

## 2022-08-12 NOTE — TELEPHONE ENCOUNTER
----- Message from Joseluis Norman MD sent at 8/11/2022  3:43 PM CDT -----  The a1c is normal.  Book a1c in 6 months.  Notify of the appointment via MyChart or letter.   Refill Diabetes Meds x 6 months.  The patient's Health Maintenance that is due is below and needs to be scheduled if the patient is aggreable.    Shingles Vaccine(1 of 2) Never done  Colonoscopy due on 06/19/2019  DEXA Scan due on 01/06/2022  COVID-19 Vaccine(4 - Booster for Pfizer series) due on 04/07/2022  Foot Exam due on 05/17/2022

## 2022-08-15 DIAGNOSIS — E03.9 ACQUIRED HYPOTHYROIDISM: ICD-10-CM

## 2022-08-15 NOTE — TELEPHONE ENCOUNTER
No new care gaps identified.  [facility] Embedded Care Gaps. Reference number: [id number]. 8/15/2022   5:19:28 PM CDT

## 2022-08-16 RX ORDER — LEVOTHYROXINE SODIUM 88 UG/1
88 TABLET ORAL DAILY
Qty: 90 TABLET | Refills: 2 | Status: SHIPPED | OUTPATIENT
Start: 2022-08-16

## 2022-08-16 NOTE — TELEPHONE ENCOUNTER
Refill Decision Note   Gloria Hale  is requesting a refill authorization.  Brief Assessment and Rationale for Refill:  Approve     Medication Therapy Plan:  ACCEPTABLE PER ORC PROTOCOL    Medication Reconciliation Completed: No   Comments:     No Care Gaps recommended.     Note composed:12:20 PM 08/16/2022

## 2022-08-17 ENCOUNTER — PES CALL (OUTPATIENT)
Dept: ADMINISTRATIVE | Facility: CLINIC | Age: 82
End: 2022-08-17
Payer: COMMERCIAL

## 2022-08-24 DIAGNOSIS — Z78.0 MENOPAUSE: ICD-10-CM

## 2022-11-03 DIAGNOSIS — Z71.89 COMPLEX CARE COORDINATION: ICD-10-CM

## 2022-12-06 ENCOUNTER — TELEPHONE (OUTPATIENT)
Dept: FAMILY MEDICINE | Facility: CLINIC | Age: 82
End: 2022-12-06

## 2022-12-06 ENCOUNTER — OFFICE VISIT (OUTPATIENT)
Dept: FAMILY MEDICINE | Facility: CLINIC | Age: 82
End: 2022-12-06
Payer: MEDICARE

## 2022-12-06 ENCOUNTER — HOSPITAL ENCOUNTER (OUTPATIENT)
Dept: RADIOLOGY | Facility: HOSPITAL | Age: 82
Discharge: HOME | End: 2022-12-06
Attending: NURSE PRACTITIONER
Payer: MEDICARE

## 2022-12-06 VITALS
TEMPERATURE: 98 F | BODY MASS INDEX: 22.4 KG/M2 | SYSTOLIC BLOOD PRESSURE: 151 MMHG | HEART RATE: 77 BPM | WEIGHT: 118.63 LBS | HEIGHT: 61 IN | OXYGEN SATURATION: 95 % | DIASTOLIC BLOOD PRESSURE: 68 MMHG

## 2022-12-06 DIAGNOSIS — R05.9 COUGH: ICD-10-CM

## 2022-12-06 DIAGNOSIS — R05.9 COUGH, UNSPECIFIED TYPE: ICD-10-CM

## 2022-12-06 DIAGNOSIS — R00.2 PALPITATIONS: ICD-10-CM

## 2022-12-06 DIAGNOSIS — R01.1 CARDIAC MURMUR: ICD-10-CM

## 2022-12-06 DIAGNOSIS — R53.83 FATIGUE, UNSPECIFIED TYPE: Primary | ICD-10-CM

## 2022-12-06 LAB
BACTERIA #/AREA URNS HPF: NORMAL /HPF
BILIRUB UR QL STRIP: NEGATIVE
CLARITY UR: CLEAR
COLOR UR: YELLOW
CTP QC/QA: YES
FLUAV AG NPH QL: NEGATIVE
FLUBV AG NPH QL: NEGATIVE
GLUCOSE UR QL STRIP: NEGATIVE
HGB UR QL STRIP: NEGATIVE
HYALINE CASTS #/AREA URNS LPF: 0 /LPF
KETONES UR QL STRIP: NEGATIVE
LEUKOCYTE ESTERASE UR QL STRIP: NEGATIVE
MICROSCOPIC COMMENT: NORMAL
NITRITE UR QL STRIP: NEGATIVE
PH UR STRIP: 6 [PH] (ref 5–8)
PROT UR QL STRIP: ABNORMAL
RBC #/AREA URNS HPF: 0 /HPF (ref 0–4)
SP GR UR STRIP: 1.03 (ref 1–1.03)
SQUAMOUS #/AREA URNS HPF: 3 /HPF
URN SPEC COLLECT METH UR: ABNORMAL
WBC #/AREA URNS HPF: 2 /HPF (ref 0–5)

## 2022-12-06 PROCEDURE — U0005 INFEC AGEN DETEC AMPLI PROBE: HCPCS | Performed by: NURSE PRACTITIONER

## 2022-12-06 PROCEDURE — 93010 EKG 12-LEAD: ICD-10-PCS | Mod: S$PBB,,, | Performed by: INTERNAL MEDICINE

## 2022-12-06 PROCEDURE — 71046 XR CHEST PA AND LATERAL: ICD-10-PCS | Mod: 26,,, | Performed by: RADIOLOGY

## 2022-12-06 PROCEDURE — 71046 X-RAY EXAM CHEST 2 VIEWS: CPT | Mod: TC,PO

## 2022-12-06 PROCEDURE — 99215 OFFICE O/P EST HI 40 MIN: CPT | Mod: PBBFAC,25,PO | Performed by: NURSE PRACTITIONER

## 2022-12-06 PROCEDURE — 99999 PR PBB SHADOW E&M-EST. PATIENT-LVL V: CPT | Mod: PBBFAC,,, | Performed by: NURSE PRACTITIONER

## 2022-12-06 PROCEDURE — 81000 URINALYSIS NONAUTO W/SCOPE: CPT | Mod: PO | Performed by: NURSE PRACTITIONER

## 2022-12-06 PROCEDURE — 93010 ELECTROCARDIOGRAM REPORT: CPT | Mod: S$PBB,,, | Performed by: INTERNAL MEDICINE

## 2022-12-06 PROCEDURE — 99214 PR OFFICE/OUTPT VISIT, EST, LEVL IV, 30-39 MIN: ICD-10-PCS | Mod: S$PBB,,, | Performed by: NURSE PRACTITIONER

## 2022-12-06 PROCEDURE — 99214 OFFICE O/P EST MOD 30 MIN: CPT | Mod: S$PBB,,, | Performed by: NURSE PRACTITIONER

## 2022-12-06 PROCEDURE — 71046 X-RAY EXAM CHEST 2 VIEWS: CPT | Mod: 26,,, | Performed by: RADIOLOGY

## 2022-12-06 PROCEDURE — 99999 PR PBB SHADOW E&M-EST. PATIENT-LVL V: ICD-10-PCS | Mod: PBBFAC,,, | Performed by: NURSE PRACTITIONER

## 2022-12-06 PROCEDURE — 87804 INFLUENZA ASSAY W/OPTIC: CPT | Mod: PBBFAC,PO | Performed by: NURSE PRACTITIONER

## 2022-12-06 PROCEDURE — U0003 INFECTIOUS AGENT DETECTION BY NUCLEIC ACID (DNA OR RNA); SEVERE ACUTE RESPIRATORY SYNDROME CORONAVIRUS 2 (SARS-COV-2) (CORONAVIRUS DISEASE [COVID-19]), AMPLIFIED PROBE TECHNIQUE, MAKING USE OF HIGH THROUGHPUT TECHNOLOGIES AS DESCRIBED BY CMS-2020-01-R: HCPCS | Performed by: NURSE PRACTITIONER

## 2022-12-06 PROCEDURE — 93005 ELECTROCARDIOGRAM TRACING: CPT | Mod: PBBFAC,PO | Performed by: INTERNAL MEDICINE

## 2022-12-06 RX ORDER — BENZONATATE 200 MG/1
200 CAPSULE ORAL 3 TIMES DAILY PRN
Qty: 30 CAPSULE | Refills: 0 | Status: CANCELLED | OUTPATIENT
Start: 2022-12-06 | End: 2022-12-16

## 2022-12-06 NOTE — PROGRESS NOTES
"Subjective:       Patient ID: Gloria Hale is a 82 y.o. female.    Chief Complaint: Fatigue and Cough (Cough with mucus)    Fatigue  This is a new problem. The current episode started 1 to 4 weeks ago (Pt states, "I've been having weak spells over the past 3 weeks."). The problem occurs intermittently. The problem has been waxing and waning. Associated symptoms include coughing and fatigue. Pertinent negatives include no abdominal pain, anorexia, arthralgias, change in bowel habit, chest pain, chills, congestion, diaphoresis, fever, headaches, joint swelling, myalgias, nausea, neck pain, numbness, rash, sore throat, swollen glands, urinary symptoms, vertigo, visual change, vomiting or weakness. Exacerbated by: Any activity. She has tried nothing for the symptoms. The treatment provided no relief (History of AAA,ectopic atrial tachycardia; states has not seen cardiology).   Cough  This is a chronic problem. The current episode started more than 1 year ago (Pt states pulmonologist sent in [facility] last week. Pt states, "he wanted me to get tested for flu but I told him I didn't have it."). The problem occurs every few minutes. The cough is Productive of sputum. Associated symptoms include shortness of breath (pt states, "I get SOB at times."). Pertinent negatives include no chest pain, chills, ear congestion, ear pain, fever, headaches, heartburn, hemoptysis, myalgias, nasal congestion, postnasal drip, rash, rhinorrhea, sore throat, sweats, weight loss or wheezing. Nothing aggravates the symptoms. Risk factors: Former smoker. Treatments tried: Mucinex, zpak; declines medication for cough. The treatment provided no relief. Her past medical history is significant for bronchitis, COPD and emphysema. There is no history of asthma, bronchiectasis, environmental allergies or pneumonia. Lung cancer   Palpitations   This is a recurrent (Pt states occurs upon awakening in AM) problem. The current episode started more than 1 month " "ago. The problem occurs every several days. The problem has been waxing and waning. The symptoms are aggravated by thyroid drugs and unknown. Associated symptoms include coughing, an irregular heartbeat, malaise/fatigue and shortness of breath (pt states, "I get SOB at times."). Pertinent negatives include no anxiety, chest fullness, chest pain, diaphoresis, dizziness, fever, nausea, near-syncope, numbness, syncope, vomiting or weakness. She has tried nothing for the symptoms. Risk factors include post menopause and smoking/tobacco exposure (Former smoker). There is no history of anemia, anxiety, drug use, heart disease, hyperthyroidism or a valve disorder. Lung cancer   Past Medical History:   Diagnosis Date    Abdominal aortic aneurysm     Bile duct obstruction     per pt/ has seen MD    Cancer of upper lobe of right lung 2/26/2018    She had a resection of her right upper lobe due to a cancer at [facility] and she reports that the nodes were all negative.   She is not needing chemo or radiation.     Cataract     CKD (chronic kidney disease) stage 3, GFR 30-59 ml/min     Dr. Philippe    Colon polyp     hyperplastic 2007    COPD (chronic obstructive pulmonary disease)     COPD with acute exacerbation     The patient presents with COPD.  The patient denies chest pain, palpitations, shortness of breath, difficulty breathing, and wheezing.  The patient feels that the pattern of symptoms is stable.  Current treatment has included albuterol inhaler.       Diplopia     DJD (degenerative joint disease), lumbar     HTN (hypertension)     Hyperlipidemia     Hypothyroidism     Lung cancer 2018    right    Obstruction of kidney     per pt/ pt does not know which kidney/ pt has appt to see nephrologist on 7/3    Osteopenia     Posterior vitreous detachment     Posterior vitreous detachment of right eye     Thyroiditis     positive thyroperoxidase antibodies    Trochanteric bursitis      Social History " "    Socioeconomic History    Marital status:      Spouse name: Ibrahima    Number of children: 2   Tobacco Use    Smoking status: Former     Packs/day: 1.00     Years: 45.00     Pack years: 45.00     Types: Cigarettes     Quit date: 2007     Years since quitting: 15.9    Smokeless tobacco: Never   Substance and Sexual Activity    Alcohol use: No    Drug use: No    Sexual activity: Never     Partners: Male   Social History Narrative    Lives alone     Past Surgical History:   Procedure Laterality Date    CARPAL TUNNEL RELEASE      left     SECTION, CLASSIC      x2    ESOPHAGOGASTRODUODENOSCOPY Left 2019    Procedure: EGD (ESOPHAGOGASTRODUODENOSCOPY);  Surgeon: Nicola Her MD;  Location: [facility];  Service: Endoscopy;  Laterality: Left;    LUNG REMOVAL, PARTIAL Right     [facility]       Review of Systems   Constitutional:  Positive for fatigue and malaise/fatigue. Negative for chills, diaphoresis, fever and weight loss.   HENT: Negative.  Negative for nasal congestion, ear pain, postnasal drip, rhinorrhea and sore throat.    Eyes: Negative.    Respiratory:  Positive for cough and shortness of breath (pt states, "I get SOB at times."). Negative for hemoptysis and wheezing.    Cardiovascular:  Positive for palpitations. Negative for chest pain, syncope and near-syncope.   Gastrointestinal: Negative.  Negative for abdominal pain, anorexia, change in bowel habit, heartburn, nausea, vomiting and change in bowel habit.   Endocrine: Negative.    Genitourinary: Negative.    Musculoskeletal: Negative.  Negative for arthralgias, joint swelling, myalgias and neck pain.   Integumentary:  Negative for rash. Negative.   Allergic/Immunologic: Negative.  Negative for environmental allergies.   Neurological: Negative.  Negative for dizziness, vertigo, weakness, numbness and headaches.   Psychiatric/Behavioral: Negative.  The patient is not nervous/anxious.        Objective:      Physical " Exam  Vitals and nursing note reviewed.   Constitutional:       Appearance: Normal appearance.   HENT:      Head: Normocephalic.      Right Ear: Tympanic membrane, ear canal and external ear normal.      Left Ear: Tympanic membrane, ear canal and external ear normal.      Nose: Nose normal.      Mouth/Throat:      Mouth: Mucous membranes are moist.      Pharynx: Oropharynx is clear.   Eyes:      Conjunctiva/sclera: Conjunctivae normal.      Pupils: Pupils are equal, round, and reactive to light.   Cardiovascular:      Rate and Rhythm: Normal rate and regular rhythm.      Heart sounds: Murmur heard.   Abdominal:      General: Bowel sounds are normal.      Palpations: Abdomen is soft.   Musculoskeletal:         General: Normal range of motion.      Cervical back: Normal range of motion and neck supple.   Skin:     General: Skin is warm and dry.      Capillary Refill: Capillary refill takes 2 to 3 seconds.   Neurological:      Mental Status: She is alert and oriented to person, place, and time.   Psychiatric:         Mood and Affect: Mood normal.         Behavior: Behavior normal.         Thought Content: Thought content normal.         Judgment: Judgment normal.       Assessment:       Problem List Items Addressed This Visit    None  Visit Diagnoses       Fatigue, unspecified type    -  Primary    Relevant Orders    IN OFFICE EKG 12-LEAD (to Muse)    Urinalysis, Reflex to Urine Culture Urine, Clean Catch    CBC Without Differential    Comprehensive Metabolic Panel    TSH    Ambulatory referral/consult to Cardiology    Cough, unspecified type        Relevant Orders    X-Ray Chest PA And Lateral    POCT Influenza A/B (Completed)    Palpitations        Relevant Orders    Ambulatory referral/consult to Cardiology    Cardiac murmur        Relevant Orders    Ambulatory referral/consult to Cardiology              Plan:           Gloria was seen today for fatigue and cough.    Diagnoses and all orders for this visit:    Fatigue,  unspecified type  -     IN OFFICE EKG 12-LEAD (to Muse)  -     Urinalysis, Reflex to Urine Culture Urine, Clean Catch  -     CBC Without Differential; Future  -     Comprehensive Metabolic Panel; Future  -     TSH; Future  -     Ambulatory referral/consult to Cardiology; Future    Cough, unspecified type  -     X-Ray Chest PA And Lateral; Future  -     POCT Influenza A/B        -     COVID-19 Routine Screening  Hydrate well  Rest  F/U with pulmonology     Palpitations  Cardiac murmur  -     Ambulatory referral/consult to Cardiology; Future      Report to ER immediately if symptoms worsen or persist

## 2022-12-06 NOTE — PATIENT INSTRUCTIONS
Hydrate well  Rest  F/U with pulmonology   Cardiology recommended  Report to ER immediately if symptoms worsen or persist

## 2022-12-07 LAB — SARS-COV-2 RNA RESP QL NAA+PROBE: NOT DETECTED

## 2022-12-07 RX ORDER — BENZONATATE 200 MG/1
200 CAPSULE ORAL 3 TIMES DAILY PRN
Qty: 30 CAPSULE | Refills: 0 | Status: SHIPPED | OUTPATIENT
Start: 2022-12-07 | End: 2022-12-17

## 2023-01-13 ENCOUNTER — PES CALL (OUTPATIENT)
Dept: ADMINISTRATIVE | Facility: CLINIC | Age: 83
End: 2023-01-13
Payer: COMMERCIAL

## 2023-01-18 ENCOUNTER — LAB VISIT (OUTPATIENT)
Dept: LAB | Facility: HOSPITAL | Age: 83
End: 2023-01-18
Attending: INTERNAL MEDICINE
Payer: MEDICARE

## 2023-01-18 DIAGNOSIS — N18.31 STAGE 3A CHRONIC KIDNEY DISEASE: ICD-10-CM

## 2023-01-18 LAB
ALBUMIN SERPL BCP-MCNC: 3.8 G/DL (ref 3.5–5.2)
ANION GAP SERPL CALC-SCNC: 9 MMOL/L (ref 8–16)
BASOPHILS # BLD AUTO: 0.06 K/UL (ref 0–0.2)
BASOPHILS NFR BLD: 1 % (ref 0–1.9)
BUN SERPL-MCNC: 16 MG/DL (ref 8–23)
CALCIUM SERPL-MCNC: 9.7 MG/DL (ref 8.7–10.5)
CHLORIDE SERPL-SCNC: 107 MMOL/L (ref 95–110)
CO2 SERPL-SCNC: 26 MMOL/L (ref 23–29)
CREAT SERPL-MCNC: 1.1 MG/DL (ref 0.5–1.4)
DIFFERENTIAL METHOD: ABNORMAL
EOSINOPHIL # BLD AUTO: 0.9 K/UL (ref 0–0.5)
EOSINOPHIL NFR BLD: 14.7 % (ref 0–8)
ERYTHROCYTE [DISTWIDTH] IN BLOOD BY AUTOMATED COUNT: 17.4 % (ref 11.5–14.5)
EST. GFR  (NO RACE VARIABLE): 49.5 ML/MIN/1.73 M^2
GLUCOSE SERPL-MCNC: 122 MG/DL (ref 70–110)
HCT VFR BLD AUTO: 38.6 % (ref 37–48.5)
HGB BLD-MCNC: 11.5 G/DL (ref 12–16)
IMM GRANULOCYTES # BLD AUTO: 0.01 K/UL (ref 0–0.04)
IMM GRANULOCYTES NFR BLD AUTO: 0.2 % (ref 0–0.5)
LYMPHOCYTES # BLD AUTO: 1.3 K/UL (ref 1–4.8)
LYMPHOCYTES NFR BLD: 21.3 % (ref 18–48)
MCH RBC QN AUTO: 24.1 PG (ref 27–31)
MCHC RBC AUTO-ENTMCNC: 29.8 G/DL (ref 32–36)
MCV RBC AUTO: 81 FL (ref 82–98)
MONOCYTES # BLD AUTO: 0.7 K/UL (ref 0.3–1)
MONOCYTES NFR BLD: 11.9 % (ref 4–15)
NEUTROPHILS # BLD AUTO: 3.2 K/UL (ref 1.8–7.7)
NEUTROPHILS NFR BLD: 50.9 % (ref 38–73)
NRBC BLD-RTO: 0 /100 WBC
PHOSPHATE SERPL-MCNC: 2.9 MG/DL (ref 2.7–4.5)
PLATELET # BLD AUTO: 323 K/UL (ref 150–450)
PMV BLD AUTO: 10.8 FL (ref 9.2–12.9)
POTASSIUM SERPL-SCNC: 4.5 MMOL/L (ref 3.5–5.1)
PTH-INTACT SERPL-MCNC: 68.6 PG/ML (ref 9–77)
RBC # BLD AUTO: 4.78 M/UL (ref 4–5.4)
SODIUM SERPL-SCNC: 142 MMOL/L (ref 136–145)
WBC # BLD AUTO: 6.21 K/UL (ref 3.9–12.7)

## 2023-01-18 PROCEDURE — 36415 COLL VENOUS BLD VENIPUNCTURE: CPT | Mod: PO | Performed by: INTERNAL MEDICINE

## 2023-01-18 PROCEDURE — 83970 ASSAY OF PARATHORMONE: CPT | Performed by: INTERNAL MEDICINE

## 2023-01-18 PROCEDURE — 80069 RENAL FUNCTION PANEL: CPT | Performed by: INTERNAL MEDICINE

## 2023-01-18 PROCEDURE — 85025 COMPLETE CBC W/AUTO DIFF WBC: CPT | Performed by: INTERNAL MEDICINE

## 2023-01-23 ENCOUNTER — TELEPHONE (OUTPATIENT)
Dept: NEPHROLOGY | Facility: CLINIC | Age: 83
End: 2023-01-23
Payer: COMMERCIAL

## 2023-01-23 NOTE — TELEPHONE ENCOUNTER
----- Message from Rafal Huitron sent at 1/23/2023  7:58 AM CST -----  Contact: efrem  Patient is calling to discuss appointments. Please call her back at [phone].        Thanks  DD

## 2023-02-08 ENCOUNTER — TELEPHONE (OUTPATIENT)
Dept: FAMILY MEDICINE | Facility: CLINIC | Age: 83
End: 2023-02-08
Payer: COMMERCIAL

## 2023-02-08 RX ORDER — CODEINE PHOSPHATE AND GUAIFENESIN 10; 100 MG/5ML; MG/5ML
10 SOLUTION ORAL 4 TIMES DAILY PRN
Qty: 120 ML | Refills: 0 | Status: SHIPPED | OUTPATIENT
Start: 2023-02-08 | End: 2023-02-18

## 2023-02-08 NOTE — TELEPHONE ENCOUNTER
----- Message from Laverne Damon sent at 2/8/2023  2:42 PM CST -----  Contact: Gloria  Patient is calling to request office to laz in medication to pharmacy. Reports coughing up mucus for about 4 days and is also requesting a call back at .[phone]        [facility], [facility] [address] [facility] [address] [id number] [address] [address]  Phone: [phone] Fax: [phone]

## 2023-02-08 NOTE — TELEPHONE ENCOUNTER
Returned patient call, she stated she has been coughing up phlem that is yellow in color for 4 days, no fever and the only thing she has taken otc is mucinex. Patient is requesting something be called in for her for the phlem and cough. Please advise.

## 2023-02-08 NOTE — TELEPHONE ENCOUNTER
I have signed for the following orders AND/OR meds.  Please call the patient and ask the patient to schedule the testing AND/OR inform about any medications that were sent.      No orders of the defined types were placed in this encounter.      Medications Ordered This Encounter   Medications    guaiFENesin-codeine 100-10 mg/5 ml (TUSSI-ORGANIDIN NR)  mg/5 mL syrup     Sig: Take 10 mLs by mouth 4 (four) times daily as needed for Cough.     Dispense:  120 mL     Refill:  0     Order Specific Question:   I have reviewed the Prescription Drug Monitoring Program (PDMP) database for this patient prior to prescribing the above opioid medication     Answer:   Yes

## 2023-02-09 ENCOUNTER — TELEPHONE (OUTPATIENT)
Dept: FAMILY MEDICINE | Facility: CLINIC | Age: 83
End: 2023-02-09
Payer: COMMERCIAL

## 2023-02-09 NOTE — TELEPHONE ENCOUNTER
----- Message from Adolfo Cobb sent at 2/9/2023  8:26 AM CST -----  Pt is calling in from yesterday    The chart stated she missed a call    Pls call her back at [phone]    Thank you    Kellee

## 2023-02-09 NOTE — TELEPHONE ENCOUNTER
Patient informed of medication sent to pharmacy. She states she will give it a try and let us know

## 2023-03-28 ENCOUNTER — PES CALL (OUTPATIENT)
Dept: ADMINISTRATIVE | Facility: CLINIC | Age: 83
End: 2023-03-28
Payer: COMMERCIAL

## 2023-05-14 NOTE — TELEPHONE ENCOUNTER
I refilled the requested medication x 1 month.    The patient is due for an visit in the office.  Call the patient on the phone and book the patient with Jenna Cobb NP for a visit.     PLEASE DOCUMENT THE FACT THAT YOU HAVE CONTACTED THE PATIENT IN THE CHART FOR FUTURE REFERENCE.    Health Maintenance Due   Topic Date Due    DEXA SCAN  12/14/2018    Lipid Panel  05/25/2019    Colonoscopy  06/19/2019      Patient states no history

## 2023-05-26 ENCOUNTER — PES CALL (OUTPATIENT)
Dept: ADMINISTRATIVE | Facility: CLINIC | Age: 83
End: 2023-05-26
Payer: COMMERCIAL

## 2023-05-29 ENCOUNTER — PES CALL (OUTPATIENT)
Dept: ADMINISTRATIVE | Facility: CLINIC | Age: 83
End: 2023-05-29
Payer: COMMERCIAL

## 2023-06-03 DIAGNOSIS — Z71.89 COMPLEX CARE COORDINATION: ICD-10-CM

## 2023-08-24 ENCOUNTER — TELEPHONE (OUTPATIENT)
Dept: ADMINISTRATIVE | Facility: HOSPITAL | Age: 83
End: 2023-08-24
Payer: COMMERCIAL

## 2024-01-03 DIAGNOSIS — Z71.89 COMPLEX CARE COORDINATION: ICD-10-CM

## 2024-01-31 DIAGNOSIS — Z78.0 MENOPAUSE: ICD-10-CM

## 2024-02-13 ENCOUNTER — PATIENT OUTREACH (OUTPATIENT)
Dept: ADMINISTRATIVE | Facility: HOSPITAL | Age: 84
End: 2024-02-13
Payer: COMMERCIAL

## 2024-08-03 DIAGNOSIS — Z71.89 COMPLEX CARE COORDINATION: ICD-10-CM

## 2025-07-16 NOTE — TELEPHONE ENCOUNTER
----- Message from Kathia Tim sent at 5/29/2020  2:11 PM CDT -----  Contact: patient  Type:  RX Refill Request    Who Called: patient  Refill or New Rx:refill  RX Name and Strength:clobetsol cream  How is the patient currently taking it? (ex. 1XDay):when needed  Is this a 30 day or 90 day RX:-  Preferred Pharmacy with phone number:  [facility] [place], [place] - [address] [facility]  [address] [address] [address]  Phone: [phone] Fax: [phone]      Local or Mail Order:local  Ordering Provider:Ester  Would the patient rather a call back or a response via [facility]? call  Best Call Back Number:[phone]  Additional Information: please call when sent to pharmacy    
Patient requesting medication for vaginal itch called into pharmacy.   
Prescription sent  
no
Impaired gait/Weakness